# Patient Record
Sex: FEMALE | Race: WHITE | NOT HISPANIC OR LATINO | Employment: OTHER | URBAN - NONMETROPOLITAN AREA
[De-identification: names, ages, dates, MRNs, and addresses within clinical notes are randomized per-mention and may not be internally consistent; named-entity substitution may affect disease eponyms.]

---

## 2017-01-03 ENCOUNTER — HOSPITAL ENCOUNTER (OUTPATIENT)
Facility: MEDICAL CENTER | Age: 67
End: 2017-01-03
Attending: PHYSICIAN ASSISTANT
Payer: COMMERCIAL

## 2017-01-03 ENCOUNTER — OFFICE VISIT (OUTPATIENT)
Dept: URGENT CARE | Facility: PHYSICIAN GROUP | Age: 67
End: 2017-01-03
Payer: COMMERCIAL

## 2017-01-03 VITALS
SYSTOLIC BLOOD PRESSURE: 132 MMHG | TEMPERATURE: 96.8 F | WEIGHT: 164 LBS | DIASTOLIC BLOOD PRESSURE: 78 MMHG | HEART RATE: 77 BPM | BODY MASS INDEX: 28 KG/M2 | OXYGEN SATURATION: 93 % | HEIGHT: 64 IN

## 2017-01-03 DIAGNOSIS — R39.9 UTI SYMPTOMS: ICD-10-CM

## 2017-01-03 LAB
APPEARANCE UR: CLEAR
BILIRUB UR STRIP-MCNC: NEGATIVE MG/DL
COLOR UR AUTO: YELLOW
GLUCOSE UR STRIP.AUTO-MCNC: NEGATIVE MG/DL
KETONES UR STRIP.AUTO-MCNC: NEGATIVE MG/DL
LEUKOCYTE ESTERASE UR QL STRIP.AUTO: NEGATIVE
NITRITE UR QL STRIP.AUTO: NEGATIVE
PH UR STRIP.AUTO: 6 [PH] (ref 5–8)
PROT UR QL STRIP: NEGATIVE MG/DL
RBC UR QL AUTO: NEGATIVE
SP GR UR STRIP.AUTO: 1
UROBILINOGEN UR STRIP-MCNC: NEGATIVE MG/DL

## 2017-01-03 PROCEDURE — 87086 URINE CULTURE/COLONY COUNT: CPT

## 2017-01-03 PROCEDURE — 99214 OFFICE O/P EST MOD 30 MIN: CPT | Performed by: PHYSICIAN ASSISTANT

## 2017-01-03 PROCEDURE — 81002 URINALYSIS NONAUTO W/O SCOPE: CPT | Performed by: PHYSICIAN ASSISTANT

## 2017-01-03 RX ORDER — PHENAZOPYRIDINE HYDROCHLORIDE 200 MG/1
200 TABLET, FILM COATED ORAL 3 TIMES DAILY PRN
Qty: 6 TAB | Refills: 0 | Status: SHIPPED | OUTPATIENT
Start: 2017-01-03 | End: 2017-01-26

## 2017-01-03 RX ORDER — NITROFURANTOIN 25; 75 MG/1; MG/1
100 CAPSULE ORAL 2 TIMES DAILY
Qty: 10 CAP | Refills: 0 | Status: SHIPPED | OUTPATIENT
Start: 2017-01-03 | End: 2017-01-08

## 2017-01-03 NOTE — MR AVS SNAPSHOT
"        Elizabeth Murphy   1/3/2017 1:55 PM   Office Visit   MRN: [id number]    Department:  [facility]   Dept Phone:  [phone]    Description:  Female : 1950   Provider:  Tino Ga PA-C           Reason for Visit     Dysuria           Allergies as of 1/3/2017     Allergen Noted Reactions    Ampicillin 2015   Diarrhea    Patient stated.    Latex 2015   Hives      You were diagnosed with     UTI symptoms   [793827]         Vital Signs     Blood Pressure Pulse Temperature Height Weight Body Mass Index    132/78 mmHg 77 36 °C (96.8 °F) 1.626 m (5' 4.02\") 74.39 kg (164 lb) 28.14 kg/m2    Oxygen Saturation Smoking Status                93% Never Smoker           Basic Information     Date Of Birth Sex Race Ethnicity Preferred Language    1950 Female White Non- English      Your appointments     2017  1:40 PM   Established Patient with Mica Colon M.D.   [address] [address] [phone]           You will be receiving a confirmation call a few days before your appointment from our automated call confirmation system.              Problem List              ICD-10-CM Priority Class Noted - Resolved    Dyslipidemia E78.5   3/5/2013 - Present    Fibromyalgia M79.7   3/5/2013 - Present    Migraine    3/5/2013 - Present    Hypertension I10   3/6/2013 - Present    Vitamin D deficiency disease E55.9   2013 - Present    Common bile duct (CBD) stricture K83.1   3/3/2014 - Present    Chronic back pain M54.9, G89.29   3/4/2014 - Present    Hypothyroidism E03.9   2014 - Present    Mass of left foot R22.42   2014 - Present    Elevated fasting glucose R73.01   2014 - Present    Breast cyst N60.09   2015 - Present    GERD (gastroesophageal reflux disease) K21.9   2015 - Present    Chronic kidney disease, stage 3 N18.3   2015 - Present    Diarrhea R19.7   5/10/2015 - Present  "    Lumbar radiculopathy M54.16   5/10/2015 - Present    Yeast infection of the skin B37.2   6/17/2015 - Present    Vaginal atrophy N95.2   1/21/2016 - Present    HLD (hyperlipidemia) E78.5   4/20/2016 - Present    Dysuria R30.0   7/27/2016 - Present    Well woman exam with routine gynecological exam Z01.419   12/2/2016 - Present    Cyst of right breast N60.01   12/2/2016 - Present      Health Maintenance        Date Due Completion Dates    IMM DTaP/Tdap/Td Vaccine (1 - Tdap) 9/27/1971 ---    IMM ZOSTER VACCINE 9/27/2012 ---    MAMMOGRAM 12/15/2017 12/15/2016, 5/9/2016, 4/27/2016, 4/7/2015, 12/11/2013, 10/25/2011, 10/7/2011    PAP SMEAR 12/2/2019 12/2/2016, 2/1/2012    COLONOSCOPY 10/8/2024 10/8/2014            Results     POCT Urinalysis      Component Value Standard Range & Units    POC Color Yellow Negative    POC Appearance clear Negative    POC Leukocyte Esterase Negative Negative    POC Nitrites Negative Negative    POC Urobiligen Negative Negative (0.2) mg/dL    POC Protein Negative Negative mg/dL    POC Urine PH 6.0 5.0 - 8.0    POC Blood Negative Negative    POC Specific Gravity 1.005 <1.005 - >1.030    POC Ketones Negative Negative mg/dL    POC Biliruben Negative Negative mg/dL    POC Glucose Negative Negative mg/dL                        Current Immunizations     Influenza Vaccine Quad Inj (Pf) 1/20/2016, 10/9/2014 10:30 AM    Influenza Vaccine Quad Inj (Preserved) 10/31/2016, 10/20/2015      Below and/or attached are the medications your provider expects you to take. Review all of your home medications and newly ordered medications with your provider and/or pharmacist. Follow medication instructions as directed by your provider and/or pharmacist. Please keep your medication list with you and share with your provider. Update the information when medications are discontinued, doses are changed, or new medications (including over-the-counter products) are added; and carry medication information at all times  in the event of emergency situations     Allergies:  AMPICILLIN - Diarrhea     LATEX - Hives               Medications  Valid as of: January 03, 2017 -  3:16 PM    Generic Name Brand Name Tablet Size Instructions for use    Atorvastatin Calcium (Tab) LIPITOR 40 MG Take 1 Tab by mouth every day.        Calcium-Vitamin D   Take  by mouth.        Celecoxib (Cap) CELEBREX 100 MG Take 1 Cap by mouth every day.        Citalopram Hydrobromide (Tab) CELEXA 20 MG Take 30 mg by mouth every day.        ClonazePAM (Tab) KLONOPIN 1 MG Take 1 mg by mouth 2 times a day.        Fluticasone Propionate (Suspension) FLONASE 50 MCG/ACT Spray 2 Sprays in nose every day.        Levothyroxine Sodium (Tab) SYNTHROID 88 MCG Take 1 Tab by mouth every day.        Lisinopril-Hydrochlorothiazide (Tab) PRINZIDE, ZESTORETIC 20-12.5 MG Take 1 Tab by mouth every day.        Methylphenidate HCl (CAPSULE SR 24 HR) RITALIN LA 10 MG Take 10 mg by mouth every morning.        Nitrofurantoin Monohyd Macro (Cap) MACROBID 100 MG Take 1 Cap by mouth 2 times a day for 5 days.        Omeprazole (CAPSULE DELAYED RELEASE) PRILOSEC 40 MG Take 1 Cap by mouth every day.        Omeprazole (CAPSULE DELAYED RELEASE) PRILOSEC 20 MG Take 20 mg by mouth every day.        Phenazopyridine HCl (Tab) PYRIDIUM 200 MG Take 1 Tab by mouth 3 times a day as needed.        Simvastatin (Tab) ZOCOR 40 MG Take 1 Tab by mouth every evening.        Topiramate (Tab) TOPAMAX 100 MG Take 1 Tab by mouth 2 times a day.        TraMADol HCl (Tab) ULTRAM 50 MG Take 1 Tab by mouth every 6 hours as needed for Mild Pain (back pain).        TraZODone HCl (Tab) DESYREL 50 MG Take 50 mg by mouth every evening.        .                 Medicines prescribed today were sent to:     [facility]  [place] - [address] [facility]    [address] [facility] [place] [address]    Phone: [phone] Fax: [phone]    Open 24 Hours?: No    [facility] - [place], [place] -  [address]    [address] [facility] [address]    Phone: [phone] Fax: [phone]    Open 24 Hours?: No      Medication refill instructions:       If your prescription bottle indicates you have medication refills left, it is not necessary to call your provider’s office. Please contact your pharmacy and they will refill your medication.    If your prescription bottle indicates you do not have any refills left, you may request refills at any time through one of the following ways: The online [employer] system (except Urgent Care), by calling your provider’s office, or by asking your pharmacy to contact your provider’s office with a refill request. Medication refills are processed only during regular business hours and may not be available until the next business day. Your provider may request additional information or to have a follow-up visit with you prior to refilling your medication.   *Please Note: Medication refills are assigned a new Rx number when refilled electronically. Your pharmacy may indicate that no refills were authorized even though a new prescription for the same medication is available at the pharmacy. Please request the medicine by name with the pharmacy before contacting your provider for a refill.        Your To Do List     Future Labs/Procedures Complete By Expires    URINE CULTURE(NEW)  As directed 1/3/2018      Other Notes About Your Plan     Last UDS: 6/17/15  Dr Colon  Contolled Substance agreement signed:  6/17/15 Dr Darlene Botello Access Code: Activation code not generated  Current [employer] Status: Active

## 2017-01-03 NOTE — PROGRESS NOTES
Chief Complaint   Patient presents with   • Dysuria       HISTORY OF PRESENT ILLNESS: Patient is a 66 y.o. female who presents today because she has a 4-5 day history of increased urinary urgency, frequency, dysuria. She has been drinking over a gallon and a half of water a day, trying to get rid of it, which she thinks has been helping to some degree, but she still has the pain with urination. She has not been taking anything else. Denies any fevers, chills, nausea, vomiting or diarrhea.    Patient Active Problem List    Diagnosis Date Noted   • Well woman exam with routine gynecological exam 12/02/2016   • Cyst of right breast 12/02/2016   • Dysuria 07/27/2016   • HLD (hyperlipidemia) 04/20/2016   • Vaginal atrophy 01/21/2016   • Yeast infection of the skin 06/17/2015   • Diarrhea 05/10/2015   • Lumbar radiculopathy 05/10/2015   • Chronic kidney disease, stage 3 02/05/2015   • Breast cyst 02/04/2015   • GERD (gastroesophageal reflux disease) 02/04/2015   • Hypothyroidism 07/31/2014   • Mass of left foot 07/31/2014   • Elevated fasting glucose 07/31/2014   • Chronic back pain 03/04/2014   • Common bile duct (CBD) stricture 03/03/2014   • Vitamin D deficiency disease 11/19/2013   • Hypertension 03/06/2013   • Dyslipidemia 03/05/2013   • Fibromyalgia 03/05/2013   • Migraine 03/05/2013       Allergies:Ampicillin and Latex    Current Outpatient Prescriptions Ordered in [facility]   Medication Sig Dispense Refill   • phenazopyridine (PYRIDIUM) 200 MG Tab Take 1 Tab by mouth 3 times a day as needed. 6 Tab 0   • nitrofurantoin monohydr macro (MACROBID) 100 MG Cap Take 1 Cap by mouth 2 times a day for 5 days. 10 Cap 0   • omeprazole (PRILOSEC) 20 MG delayed-release capsule Take 20 mg by mouth every day.     • celecoxib (CELEBREX) 100 MG Cap Take 1 Cap by mouth every day. 30 Cap 3   • atorvastatin (LIPITOR) 40 MG Tab Take 1 Tab by mouth every day. 90 Tab 3   • simvastatin (ZOCOR) 40 MG Tab Take 1 Tab by mouth every evening. 90  Tab 3   • lisinopril-hydrochlorothiazide (PRINZIDE, ZESTORETIC) 20-12.5 MG per tablet Take 1 Tab by mouth every day. 90 Tab 3   • levothyroxine (SYNTHROID) 88 MCG Tab Take 1 Tab by mouth every day. 90 Tab 3   • tramadol (ULTRAM) 50 MG Tab Take 1 Tab by mouth every 6 hours as needed for Mild Pain (back pain). 60 Tab 4   • topiramate (TOPAMAX) 100 MG Tab Take 1 Tab by mouth 2 times a day. 60 Tab 4   • omeprazole (PRILOSEC) 40 MG delayed-release capsule Take 1 Cap by mouth every day. 90 Cap 3   • methylphenidate (RITALIN LA) 10 MG SR capsule Take 10 mg by mouth every morning.     • fluticasone (FLONASE) 50 MCG/ACT nasal spray Spray 2 Sprays in nose every day. 1 Bottle 4   • CALCIUM-VITAMIN D PO Take  by mouth.     • citalopram (CELEXA) 20 MG TABS Take 30 mg by mouth every day.     • trazodone (DESYREL) 50 MG TABS Take 50 mg by mouth every evening.     • clonazepam (KLONOPIN) 1 MG TABS Take 1 mg by mouth 2 times a day.       No current [facility]-ordered facility-administered medications on file.       Past Medical History   Diagnosis Date   • Post concussive syndrome      after fall in 1999   • Depression      sees Psych in [place]   • PTSD (post-traumatic stress disorder)    • Anxiety    • Fibromyalgia    • Degenerative joint disease      bulging discs in C-spine, L-spine   • History of mammogram      9/11, diagnostic showed breast cyst, rec 1 year   • Skin cancer      SCC   • Pap test, as part of routine gynecological examination      last 2008, no abn history   • S/P colonoscopy      nl at age 52, due 2013   • Common bile duct (CBD) stricture      hx of in 1990s   • Hypertension    • Hyperlipidemia        Social History   Substance Use Topics   • Smoking status: Never Smoker    • Smokeless tobacco: Never Used   • Alcohol Use: 0.0 oz/week     0 Standard drinks or equivalent per week       No family status information on file.     Family History   Problem Relation Age of Onset   • Cancer Mother        ROS:  Review of Systems  "  Constitutional: Negative for fever, chills, weight loss and malaise/fatigue.   HENT: Negative for ear pain, nosebleeds, congestion, sore throat and neck pain.    Eyes: Negative for blurred vision.   Respiratory: Negative for cough, sputum production, shortness of breath and wheezing.    Cardiovascular: Negative for chest pain, palpitations, orthopnea and leg swelling.   Gastrointestinal: Negative for heartburn, nausea, vomiting and abdominal pain.   Genitourinary: Positive for dysuria, urgency and frequency.     Exam:  Blood pressure 132/78, pulse 77, temperature 36 °C (96.8 °F), height 1.626 m (5' 4.02\"), weight 74.39 kg (164 lb), SpO2 93 %.  General:  Well nourished, well developed female in NAD  Head:Normocephalic, atraumatic  Eyes: PERRLA, EOM within normal limits, no conjunctival injection, no scleral icterus, visual fields and acuity grossly intact.  Pulmonary: chest is symmetrical with respiration, no wheezes, crackles, or rhonchi.  Cardiovascular: regular rate and rhythm without murmurs, rubs, or gallops.  Extremities: no clubbing, cyanosis, or edema.    Please note that this dictation was created using voice recognition software. I have made every reasonable attempt to correct obvious errors, but I expect that there are errors of grammar and possibly content that I did not discover before finalizing the note.    Assessment/Plan:  1. UTI symptoms  POCT Urinalysis    URINE CULTURE(NEW)    phenazopyridine (PYRIDIUM) 200 MG Tab    nitrofurantoin monohydr macro (MACROBID) 100 MG Cap    symptoms. Pathopneumonic for UTI, UA in the office unremarkable. Will await culture and I will call the patient Regardless of her findings    Followup with primary care in the next 7-10 days if not significantly improving, return to the urgent care or go to the emergency room sooner for any worsening of symptoms.         "

## 2017-01-05 LAB
BACTERIA UR CULT: NORMAL
SIGNIFICANT IND 70042: NORMAL
SOURCE SOURCE: NORMAL

## 2017-01-11 RX ORDER — SIMVASTATIN 40 MG
TABLET ORAL
Qty: 90 TAB | Refills: 3 | Status: SHIPPED | OUTPATIENT
Start: 2017-01-11 | End: 2017-04-16

## 2017-01-18 DIAGNOSIS — E78.00 PURE HYPERCHOLESTEROLEMIA: ICD-10-CM

## 2017-01-18 RX ORDER — ATORVASTATIN CALCIUM 40 MG/1
40 TABLET, FILM COATED ORAL DAILY
Qty: 90 TAB | Refills: 3 | Status: SHIPPED | OUTPATIENT
Start: 2017-01-18 | End: 2017-10-31

## 2017-01-18 RX ORDER — LEVOTHYROXINE SODIUM 88 UG/1
TABLET ORAL
Qty: 90 TAB | Refills: 3 | Status: SHIPPED | OUTPATIENT
Start: 2017-01-18 | End: 2017-01-23 | Stop reason: SDUPTHER

## 2017-01-19 ENCOUNTER — PATIENT MESSAGE (OUTPATIENT)
Dept: MEDICAL GROUP | Facility: PHYSICIAN GROUP | Age: 67
End: 2017-01-19

## 2017-01-19 DIAGNOSIS — E03.9 HYPOTHYROIDISM, UNSPECIFIED TYPE: ICD-10-CM

## 2017-01-19 NOTE — TELEPHONE ENCOUNTER
Was the patient seen in the last year in this department? Yes     Does patient have an active prescription for medications requested? Yes, changing to [facility] please    Received Request Via: Pharmacy

## 2017-01-20 ENCOUNTER — TELEPHONE (OUTPATIENT)
Dept: MEDICAL GROUP | Facility: PHYSICIAN GROUP | Age: 67
End: 2017-01-20

## 2017-01-23 RX ORDER — LEVOTHYROXINE SODIUM 88 UG/1
TABLET ORAL
Qty: 90 TAB | Refills: 3 | Status: SHIPPED | OUTPATIENT
Start: 2017-01-23 | End: 2018-01-29 | Stop reason: SDUPTHER

## 2017-01-26 ENCOUNTER — HOSPITAL ENCOUNTER (OUTPATIENT)
Facility: MEDICAL CENTER | Age: 67
End: 2017-01-26
Attending: NURSE PRACTITIONER
Payer: COMMERCIAL

## 2017-01-26 ENCOUNTER — OFFICE VISIT (OUTPATIENT)
Dept: MEDICAL GROUP | Facility: PHYSICIAN GROUP | Age: 67
End: 2017-01-26
Payer: COMMERCIAL

## 2017-01-26 VITALS
TEMPERATURE: 97.2 F | BODY MASS INDEX: 26.12 KG/M2 | DIASTOLIC BLOOD PRESSURE: 60 MMHG | RESPIRATION RATE: 16 BRPM | HEART RATE: 66 BPM | SYSTOLIC BLOOD PRESSURE: 112 MMHG | HEIGHT: 64 IN | WEIGHT: 153 LBS | OXYGEN SATURATION: 96 %

## 2017-01-26 DIAGNOSIS — R35.0 URINARY FREQUENCY: ICD-10-CM

## 2017-01-26 DIAGNOSIS — N39.0 FREQUENT UTI: ICD-10-CM

## 2017-01-26 DIAGNOSIS — R30.0 DYSURIA: ICD-10-CM

## 2017-01-26 DIAGNOSIS — N95.2 VAGINAL ATROPHY: ICD-10-CM

## 2017-01-26 LAB
APPEARANCE UR: CLEAR
BILIRUB UR STRIP-MCNC: NORMAL MG/DL
COLOR UR AUTO: NORMAL
GLUCOSE UR STRIP.AUTO-MCNC: NORMAL MG/DL
KETONES UR STRIP.AUTO-MCNC: NORMAL MG/DL
LEUKOCYTE ESTERASE UR QL STRIP.AUTO: NORMAL
NITRITE UR QL STRIP.AUTO: NORMAL
PH UR STRIP.AUTO: 6 [PH] (ref 5–8)
PROT UR QL STRIP: NORMAL MG/DL
RBC UR QL AUTO: NORMAL
SP GR UR STRIP.AUTO: 1.02
UROBILINOGEN UR STRIP-MCNC: NORMAL MG/DL

## 2017-01-26 PROCEDURE — 87077 CULTURE AEROBIC IDENTIFY: CPT

## 2017-01-26 PROCEDURE — 87086 URINE CULTURE/COLONY COUNT: CPT

## 2017-01-26 PROCEDURE — 81002 URINALYSIS NONAUTO W/O SCOPE: CPT | Performed by: NURSE PRACTITIONER

## 2017-01-26 PROCEDURE — 87186 SC STD MICRODIL/AGAR DIL: CPT

## 2017-01-26 PROCEDURE — 99214 OFFICE O/P EST MOD 30 MIN: CPT | Performed by: NURSE PRACTITIONER

## 2017-01-26 RX ORDER — PHENAZOPYRIDINE HYDROCHLORIDE 200 MG/1
200 TABLET, FILM COATED ORAL 3 TIMES DAILY PRN
Qty: 16 TAB | Refills: 0 | Status: SHIPPED | OUTPATIENT
Start: 2017-01-26 | End: 2017-03-17 | Stop reason: SDUPTHER

## 2017-01-26 RX ORDER — SULFAMETHOXAZOLE AND TRIMETHOPRIM 800; 160 MG/1; MG/1
1 TABLET ORAL 2 TIMES DAILY
Qty: 10 TAB | Refills: 0 | Status: SHIPPED | OUTPATIENT
Start: 2017-01-26 | End: 2017-01-31

## 2017-01-26 NOTE — MR AVS SNAPSHOT
"        Elizabeth Murphy   2017 9:40 AM   Office Visit   MRN: [id number]    Department:  [facility]   Dept Phone:  [phone]    Description:  Female : 1950   Provider:  TERRI Quiñones           Reason for Visit     Urinary Frequency burning and pain. X last wednesday       Allergies as of 2017     Allergen Noted Reactions    Ampicillin 2015   Diarrhea    Patient stated.    Latex 2015   Hives      You were diagnosed with     Urinary frequency   [788.41.ICD-9-CM]       Dysuria   [788.1.ICD-9-CM]       Vaginal atrophy   [398655]       Frequent UTI   [942073]         Vital Signs     Blood Pressure Pulse Temperature Respirations Height Weight    112/60 mmHg 66 36.2 °C (97.2 °F) 16 1.626 m (5' 4.02\") 69.4 kg (153 lb)    Body Mass Index Oxygen Saturation Smoking Status             26.25 kg/m2 96% Never Smoker          Basic Information     Date Of Birth Sex Race Ethnicity Preferred Language    1950 Female White Non- English      Your appointments     2017  1:40 PM   Established Patient with Mica Colon M.D.   [address] [address] [phone]           You will be receiving a confirmation call a few days before your appointment from our automated call confirmation system.              Problem List              ICD-10-CM Priority Class Noted - Resolved    Dyslipidemia E78.5   3/5/2013 - Present    Fibromyalgia M79.7   3/5/2013 - Present    Migraine    3/5/2013 - Present    Hypertension I10   3/6/2013 - Present    Vitamin D deficiency disease E55.9   2013 - Present    Common bile duct (CBD) stricture K83.1   3/3/2014 - Present    Chronic back pain M54.9, G89.29   3/4/2014 - Present    Hypothyroidism E03.9   2014 - Present    Mass of left foot R22.42   2014 - Present    Elevated fasting glucose R73.01   2014 - Present    Breast cyst N60.09   2015 - Present "    GERD (gastroesophageal reflux disease) K21.9   2/4/2015 - Present    Chronic kidney disease, stage 3 N18.3   2/5/2015 - Present    Diarrhea R19.7   5/10/2015 - Present    Lumbar radiculopathy M54.16   5/10/2015 - Present    Yeast infection of the skin B37.2   6/17/2015 - Present    Vaginal atrophy N95.2   1/21/2016 - Present    HLD (hyperlipidemia) E78.5   4/20/2016 - Present    Dysuria R30.0   7/27/2016 - Present    Well woman exam with routine gynecological exam Z01.419   12/2/2016 - Present    Cyst of right breast N60.01   12/2/2016 - Present      Health Maintenance        Date Due Completion Dates    IMM DTaP/Tdap/Td Vaccine (1 - Tdap) 9/27/1971 ---    IMM ZOSTER VACCINE 9/27/2012 ---    MAMMOGRAM 12/15/2017 12/15/2016, 5/9/2016, 4/27/2016, 4/7/2015, 12/11/2013, 10/25/2011, 10/7/2011    PAP SMEAR 12/2/2019 12/2/2016, 2/1/2012    COLONOSCOPY 10/8/2024 10/8/2014            Results     POCT Urinalysis      Component Value Standard Range & Units    POC Color rocio Negative    POC Appearance clear Negative    POC Leukocyte Esterase neg Negative    POC Nitrites neg Negative    POC Urobiligen neg Negative (0.2) mg/dL    POC Protein neg Negative mg/dL    POC Urine PH 6.0 5.0 - 8.0    POC Blood neg Negative    POC Specific Gravity 1.025 <1.005 - >1.030    POC Ketones neg Negative mg/dL    POC Biliruben neg Negative mg/dL    POC Glucose neg Negative mg/dL                        Current Immunizations     Influenza Vaccine Quad Inj (Pf) 1/20/2016, 10/9/2014 10:30 AM    Influenza Vaccine Quad Inj (Preserved) 10/31/2016, 10/20/2015      Below and/or attached are the medications your provider expects you to take. Review all of your home medications and newly ordered medications with your provider and/or pharmacist. Follow medication instructions as directed by your provider and/or pharmacist. Please keep your medication list with you and share with your provider. Update the information when medications are discontinued,  doses are changed, or new medications (including over-the-counter products) are added; and carry medication information at all times in the event of emergency situations     Allergies:  AMPICILLIN - Diarrhea     LATEX - Hives               Medications  Valid as of: January 26, 2017 - 10:35 AM    Generic Name Brand Name Tablet Size Instructions for use    Atorvastatin Calcium (Tab) LIPITOR 40 MG Take 1 Tab by mouth every day.        Calcium-Vitamin D   Take  by mouth.        Celecoxib (Cap) CELEBREX 100 MG Take 1 Cap by mouth every day.        Citalopram Hydrobromide (Tab) CELEXA 20 MG Take 30 mg by mouth every day.        ClonazePAM (Tab) KLONOPIN 1 MG Take 1 mg by mouth 2 times a day.        Fluticasone Propionate (Suspension) FLONASE 50 MCG/ACT Spray 2 Sprays in nose every day.        Levothyroxine Sodium (Tab) SYNTHROID 88 MCG TAKE ONE TABLET BY MOUTH ONCE DAILY        Lisinopril-Hydrochlorothiazide (Tab) PRINZIDE, ZESTORETIC 20-12.5 MG Take 1 Tab by mouth every day.        Methylphenidate HCl (CAPSULE SR 24 HR) RITALIN LA 10 MG Take 10 mg by mouth every morning.        Omeprazole (CAPSULE DELAYED RELEASE) PRILOSEC 40 MG Take 1 Cap by mouth every day.        Omeprazole (CAPSULE DELAYED RELEASE) PRILOSEC 20 MG Take 20 mg by mouth every day.        Phenazopyridine HCl (Tab) PYRIDIUM 200 MG Take 1 Tab by mouth 3 times a day as needed for Mild Pain or Moderate Pain.        Simvastatin (Tab) ZOCOR 40 MG TAKE 1 TABLET EVERY EVENING        Sulfamethoxazole-Trimethoprim (Tab) BACTRIM -160 MG Take 1 Tab by mouth 2 Times a Day for 5 days.        Topiramate (Tab) TOPAMAX 100 MG Take 1 Tab by mouth 2 times a day.        TraMADol HCl (Tab) ULTRAM 50 MG Take 1 Tab by mouth every 6 hours as needed for Mild Pain (back pain).        TraZODone HCl (Tab) DESYREL 50 MG Take 50 mg by mouth every evening.        .                 Medicines prescribed today were sent to:     [facility] [address] [place] [address], [place] - [address] [place] [address]  [facility]    [address] [facility] [place] [address]    Phone: [phone] Fax: [phone]    Open 24 Hours?: No    [facility] - [place], [place] - [address] [facility] [facility]    [address] [facility] [address]    Phone: [phone] Fax: [phone]    Open 24 Hours?: No    [facility] - [place], [place] - [address] [place] [address]    [address] [address]    Phone: [phone] Fax: [phone]    Open 24 Hours?: No      Medication refill instructions:       If your prescription bottle indicates you have medication refills left, it is not necessary to call your provider’s office. Please contact your pharmacy and they will refill your medication.    If your prescription bottle indicates you do not have any refills left, you may request refills at any time through one of the following ways: The online [employer] system (except Urgent Care), by calling your provider’s office, or by asking your pharmacy to contact your provider’s office with a refill request. Medication refills are processed only during regular business hours and may not be available until the next business day. Your provider may request additional information or to have a follow-up visit with you prior to refilling your medication.   *Please Note: Medication refills are assigned a new Rx number when refilled electronically. Your pharmacy may indicate that no refills were authorized even though a new prescription for the same medication is available at the pharmacy. Please request the medicine by name with the pharmacy before contacting your provider for a refill.        Your To Do List     Future Labs/Procedures Complete By Expires    URINE CULTURE(NEW)  As directed 1/26/2018      Referral     A referral request has been sent to our patient care coordination department. Please allow 3-5 business days for us to process this request and contact you either by phone or mail. If you do not hear  from us by the 5th business day, please call us at [phone].        Other Notes About Your Plan     Last UDS: 6/17/15  Dr Colon  Contolled Substance agreement signed:  6/17/15 Dr Colon                 MyChart Access Code: Activation code not generated  Current MyChart Status: Active

## 2017-01-26 NOTE — PROGRESS NOTES
[facility]  Primary Care Office Visit - Problem-Oriented        History:     Elizabeth Murphy is a 66 y.o. female who is here today to discuss Urinary Frequency      Dysuria  This is an ongoing, recurrent, uncontrolled problem. Patient was recently seen in the urgent care for symptoms of urgency, dysuria, suprapubic pain, her urinalysis was unremarkable, urine culture was unremarkable, she was given Macrobid and Pyridium, following completing this course her symptoms resolved.     She tells me that the symptoms returned about a week later, she is still having dysuria, frequency and suprapubic pain. She denies hematuria, low back pain, fevers, vomiting. She denies vaginal symptoms, no new vaginal discharge, vaginal odor. This is her third UTI in 6 months. Again, her urinalysis is negative for leukocytes, we have ordered a urine culture. She is postmenopausal and we did discuss atrophic vaginitis and uterine prolapse as contributing factors. At one point she was prescribed Premarin cream that she tells me she never got this. We discussed daily low-dose Macrobid versus referral to urogynecology, patient prefers referral to urogynecology. We will treat with Bactrim as I will not have the urine culture back prior to the weekend, she is agreeable. She will start Pyridium for symptomatic relief.                 Past Medical History   Diagnosis Date   • Post concussive syndrome      after fall in 1999   • Depression      sees Psych in [place]   • PTSD (post-traumatic stress disorder)    • Anxiety    • Fibromyalgia    • Degenerative joint disease      bulging discs in C-spine, L-spine   • History of mammogram      9/11, diagnostic showed breast cyst, rec 1 year   • Skin cancer      SCC   • Pap test, as part of routine gynecological examination      last 2008, no abn history   • S/P colonoscopy      nl at age 52, due 2013   • Common bile duct (CBD) stricture      hx of in 1990s   • Hypertension    • Hyperlipidemia       Past Surgical History   Procedure Laterality Date   • Anna by laparoscopy     • Egd w/endoscopic ultrasound       1990s, common bile duct stricture, no stent     Social History     Social History   • Marital Status:      Spouse Name: N/A   • Number of Children: N/A   • Years of Education: N/A     Occupational History   • Not on file.     Social History Main Topics   • Smoking status: Never Smoker    • Smokeless tobacco: Never Used   • Alcohol Use: 0.0 oz/week     0 Standard drinks or equivalent per week   • Drug Use: No   • Sexual Activity: Yes     Other Topics Concern   • Not on file     Social History Narrative     History   Smoking status   • Never Smoker    Smokeless tobacco   • Never Used     Family History   Problem Relation Age of Onset   • Cancer Mother      Allergies   Allergen Reactions   • Ampicillin Diarrhea     Patient stated.   • Latex Hives       Problem List:     Patient Active Problem List    Diagnosis Date Noted   • Well woman exam with routine gynecological exam 12/02/2016   • Cyst of right breast 12/02/2016   • Dysuria 07/27/2016   • HLD (hyperlipidemia) 04/20/2016   • Vaginal atrophy 01/21/2016   • Yeast infection of the skin 06/17/2015   • Diarrhea 05/10/2015   • Lumbar radiculopathy 05/10/2015   • Chronic kidney disease, stage 3 02/05/2015   • Breast cyst 02/04/2015   • GERD (gastroesophageal reflux disease) 02/04/2015   • Hypothyroidism 07/31/2014   • Mass of left foot 07/31/2014   • Elevated fasting glucose 07/31/2014   • Chronic back pain 03/04/2014   • Common bile duct (CBD) stricture 03/03/2014   • Vitamin D deficiency disease 11/19/2013   • Hypertension 03/06/2013   • Dyslipidemia 03/05/2013   • Fibromyalgia 03/05/2013   • Migraine 03/05/2013         Medications:     Current outpatient prescriptions:   •  phenazopyridine (PYRIDIUM) 200 MG Tab, Take 1 Tab by mouth 3 times a day as needed for Mild Pain or Moderate Pain., Disp: 16 Tab, Rfl: 0  •  sulfamethoxazole-trimethoprim  "(BACTRIM DS) 800-160 MG tablet, Take 1 Tab by mouth 2 Times a Day for 5 days., Disp: 10 Tab, Rfl: 0  •  levothyroxine (SYNTHROID) 88 MCG Tab, TAKE ONE TABLET BY MOUTH ONCE DAILY, Disp: 90 Tab, Rfl: 3  •  atorvastatin (LIPITOR) 40 MG Tab, Take 1 Tab by mouth every day., Disp: 90 Tab, Rfl: 3  •  lisinopril-hydrochlorothiazide (PRINZIDE, ZESTORETIC) 20-12.5 MG per tablet, Take 1 Tab by mouth every day., Disp: 90 Tab, Rfl: 3  •  topiramate (TOPAMAX) 100 MG Tab, Take 1 Tab by mouth 2 times a day., Disp: 60 Tab, Rfl: 4  •  omeprazole (PRILOSEC) 40 MG delayed-release capsule, Take 1 Cap by mouth every day., Disp: 90 Cap, Rfl: 3  •  CALCIUM-VITAMIN D PO, Take  by mouth., Disp: , Rfl:   •  citalopram (CELEXA) 20 MG TABS, Take 30 mg by mouth every day., Disp: , Rfl:   •  trazodone (DESYREL) 50 MG TABS, Take 50 mg by mouth every evening., Disp: , Rfl:   •  clonazepam (KLONOPIN) 1 MG TABS, Take 1 mg by mouth 2 times a day., Disp: , Rfl:   •  simvastatin (ZOCOR) 40 MG Tab, TAKE 1 TABLET EVERY EVENING, Disp: 90 Tab, Rfl: 3  •  omeprazole (PRILOSEC) 20 MG delayed-release capsule, Take 20 mg by mouth every day., Disp: , Rfl:   •  celecoxib (CELEBREX) 100 MG Cap, Take 1 Cap by mouth every day., Disp: 30 Cap, Rfl: 3  •  tramadol (ULTRAM) 50 MG Tab, Take 1 Tab by mouth every 6 hours as needed for Mild Pain (back pain)., Disp: 60 Tab, Rfl: 4  •  methylphenidate (RITALIN LA) 10 MG SR capsule, Take 10 mg by mouth every morning., Disp: , Rfl:   •  fluticasone (FLONASE) 50 MCG/ACT nasal spray, Spray 2 Sprays in nose every day., Disp: 1 Bottle, Rfl: 4      Review of Systems:     See HPI for positives, all other systems reviewed and WNL       Physical Assessment:     VS: /60 mmHg  Pulse 66  Temp(Src) 36.2 °C (97.2 °F)  Resp 16  Ht 1.626 m (5' 4.02\")  Wt 69.4 kg (153 lb)  BMI 26.25 kg/m2  SpO2 96%    General: Well-developed, well-nourished female   Head: PERRL, EOMI. Normocephalic.  Cardiovasc:No JVD.  RRR, no MRG. No thrills or " bruits. Pulses 2+ and symmetric at all distal extremities.  Pulmonary: Lungs clear bilaterally.  Normal respiratory effort. No wheeze or crackles.   Abdomen: No CVA tenderness. Abdomen soft, NT, ND, NAB.  No masses or organomegaly.  Neuro: Alert and oriented  Skin:No rashes noted. Skin warm, dry, intact    Psych: Dressed appropriately for the weather, pleasant and conversant.  Affect, mood & judgment appropriate.      Assessment/Plan:   Elizabeth was seen today for urinary frequency.    Diagnoses and all orders for this visit:    Dysuria, ongoing, uncontrolled   - Urinalysis is unremarkable for leukocytes and nitrites, urine culture has been sent, start Pyridium, we'll treat empirically with Bactrim as we are going into the weekend and I will not have urine culture results until Monday. She requests referral to urogynecology, I think this is reasonable as this is her third UTI in 6 months, the referral has been placed.  -     phenazopyridine (PYRIDIUM) 200 MG Tab; Take 1 Tab by mouth 3 times a day as needed for Mild Pain or Moderate Pain.  -     sulfamethoxazole-trimethoprim (BACTRIM DS) 800-160 MG tablet; Take 1 Tab by mouth 2 Times a Day for 5 days.  -     REFERRAL TO UROGYNECOLOGY  -     URINE CULTURE(NEW); Future    Frequent UTI, ongoing, uncontrolled   - see above plan of care  -     REFERRAL TO UROGYNECOLOGY  -     URINE CULTURE(NEW); Future    Patient is agreeable to the above plan and voiced understanding. All questions answered.     Please note that this dictation was created using voice recognition software. I have made every reasonable attempt to correct obvious errors, but I expect that there are errors of grammar and possibly content that I did not discover before finalizing the note.      JESSIE Higgins  1/26/2017, 12:54 PM

## 2017-01-26 NOTE — ASSESSMENT & PLAN NOTE
This is an ongoing, recurrent, uncontrolled problem. Patient was recently seen in the urgent care for symptoms of urgency, dysuria, suprapubic pain, her urinalysis was unremarkable, urine culture was unremarkable, she was given Macrobid and Pyridium, following completing this course her symptoms resolved.     She tells me that the symptoms returned about a week later, she is still having dysuria, frequency and suprapubic pain. She denies hematuria, low back pain, fevers, vomiting. She denies vaginal symptoms, no new vaginal discharge, vaginal odor. This is her third UTI in 6 months. Again, her urinalysis is negative for leukocytes, we have ordered a urine culture. She is postmenopausal and we did discuss atrophic vaginitis and uterine prolapse as contributing factors. At one point she was prescribed Premarin cream that she tells me she never got this. We discussed daily low-dose Macrobid versus referral to urogynecology, patient prefers referral to urogynecology. We will treat with Bactrim as I will not have the urine culture back prior to the weekend, she is agreeable. She will start Pyridium for symptomatic relief.

## 2017-01-28 LAB
BACTERIA UR CULT: ABNORMAL
BACTERIA UR CULT: ABNORMAL
SIGNIFICANT IND 70042: ABNORMAL
SOURCE SOURCE: ABNORMAL

## 2017-02-14 ENCOUNTER — HOSPITAL ENCOUNTER (OUTPATIENT)
Dept: LAB | Facility: MEDICAL CENTER | Age: 67
End: 2017-02-14
Attending: INTERNAL MEDICINE
Payer: COMMERCIAL

## 2017-02-14 DIAGNOSIS — E78.00 PURE HYPERCHOLESTEROLEMIA: ICD-10-CM

## 2017-02-14 LAB
ALBUMIN SERPL BCP-MCNC: 4.4 G/DL (ref 3.2–4.9)
ALBUMIN/GLOB SERPL: 1.8 G/DL
ALP SERPL-CCNC: 46 U/L (ref 30–99)
ALT SERPL-CCNC: 17 U/L (ref 2–50)
ANION GAP SERPL CALC-SCNC: 4 MMOL/L (ref 0–11.9)
AST SERPL-CCNC: 13 U/L (ref 12–45)
BILIRUB SERPL-MCNC: 0.4 MG/DL (ref 0.1–1.5)
BUN SERPL-MCNC: 28 MG/DL (ref 8–22)
CALCIUM SERPL-MCNC: 10.5 MG/DL (ref 8.5–10.5)
CHLORIDE SERPL-SCNC: 107 MMOL/L (ref 96–112)
CHOLEST SERPL-MCNC: 167 MG/DL (ref 100–199)
CO2 SERPL-SCNC: 28 MMOL/L (ref 20–33)
CREAT SERPL-MCNC: 1.04 MG/DL (ref 0.5–1.4)
GLOBULIN SER CALC-MCNC: 2.4 G/DL (ref 1.9–3.5)
GLUCOSE SERPL-MCNC: 95 MG/DL (ref 65–99)
HDLC SERPL-MCNC: 52 MG/DL
LDLC SERPL CALC-MCNC: 96 MG/DL
POTASSIUM SERPL-SCNC: 4.2 MMOL/L (ref 3.6–5.5)
PROT SERPL-MCNC: 6.8 G/DL (ref 6–8.2)
SODIUM SERPL-SCNC: 139 MMOL/L (ref 135–145)
TRIGL SERPL-MCNC: 97 MG/DL (ref 0–149)

## 2017-02-14 PROCEDURE — 80061 LIPID PANEL: CPT

## 2017-02-14 PROCEDURE — 80053 COMPREHEN METABOLIC PANEL: CPT

## 2017-02-14 PROCEDURE — 36415 COLL VENOUS BLD VENIPUNCTURE: CPT

## 2017-02-21 ENCOUNTER — OFFICE VISIT (OUTPATIENT)
Dept: MEDICAL GROUP | Facility: PHYSICIAN GROUP | Age: 67
End: 2017-02-21
Payer: COMMERCIAL

## 2017-02-21 VITALS
OXYGEN SATURATION: 94 % | SYSTOLIC BLOOD PRESSURE: 110 MMHG | DIASTOLIC BLOOD PRESSURE: 62 MMHG | BODY MASS INDEX: 26.02 KG/M2 | TEMPERATURE: 97.7 F | WEIGHT: 152.4 LBS | HEIGHT: 64 IN | HEART RATE: 64 BPM

## 2017-02-21 DIAGNOSIS — F33.1 MODERATE EPISODE OF RECURRENT MAJOR DEPRESSIVE DISORDER (HCC): ICD-10-CM

## 2017-02-21 DIAGNOSIS — I10 ESSENTIAL HYPERTENSION: ICD-10-CM

## 2017-02-21 DIAGNOSIS — N18.30 CHRONIC KIDNEY DISEASE, STAGE 3 (HCC): ICD-10-CM

## 2017-02-21 DIAGNOSIS — B37.2 YEAST INFECTION OF THE SKIN: ICD-10-CM

## 2017-02-21 DIAGNOSIS — E78.00 PURE HYPERCHOLESTEROLEMIA: ICD-10-CM

## 2017-02-21 DIAGNOSIS — M79.7 FIBROMYALGIA: ICD-10-CM

## 2017-02-21 DIAGNOSIS — L98.7 EXCESSIVE AND REDUNDANT SKIN AND SUBCUTANEOUS TISSUE: ICD-10-CM

## 2017-02-21 PROBLEM — G93.2 ELEVATED INTRACRANIAL PRESSURE: Status: ACTIVE | Noted: 2017-02-21

## 2017-02-21 PROCEDURE — 99214 OFFICE O/P EST MOD 30 MIN: CPT | Performed by: INTERNAL MEDICINE

## 2017-02-21 RX ORDER — TIMOLOL MALEATE 5 MG/ML
1 SOLUTION/ DROPS OPHTHALMIC 2 TIMES DAILY
COMMUNITY
End: 2017-06-21

## 2017-02-21 RX ORDER — PREGABALIN 50 MG/1
50 CAPSULE ORAL 2 TIMES DAILY
COMMUNITY
End: 2017-06-21

## 2017-02-21 RX ORDER — NYSTATIN 100000 [USP'U]/G
POWDER TOPICAL
Qty: 60 G | Refills: 3 | Status: SHIPPED | OUTPATIENT
Start: 2017-02-21 | End: 2017-04-16

## 2017-02-21 NOTE — ASSESSMENT & PLAN NOTE
The patient has had slow good weight loss over time with diet changes. She has lost 50lbs. Her blood pressure has been well controlled.

## 2017-02-21 NOTE — MR AVS SNAPSHOT
"        Elizabeth Murphy   2017 1:40 PM   Office Visit   MRN: [id number]    Department:  [facility]   Dept Phone:  [phone]    Description:  Female : 1950   Provider:  Mica Colon M.D.           Reason for Visit     Results labs      Allergies as of 2017     Allergen Noted Reactions    Ampicillin 2015   Diarrhea    Patient stated.    Latex 2015   Hives      You were diagnosed with     Essential hypertension   [5528335]       Excessive and redundant skin and subcutaneous tissue   [8112512]       Yeast infection of the skin   [025261]       Chronic kidney disease, stage 3   [758959]       Fibromyalgia   [149418]       Moderate episode of recurrent major depressive disorder (CMS-HCC)   [2540976]       Elevated intracranial pressure   [340974]       Pure hypercholesterolemia   [272.0.ICD-9-CM]         Vital Signs     Blood Pressure Pulse Temperature Height Weight Body Mass Index    110/62 mmHg 64 36.5 °C (97.7 °F) 1.626 m (5' 4\") 69.128 kg (152 lb 6.4 oz) 26.15 kg/m2    Oxygen Saturation Smoking Status                94% Never Smoker           Basic Information     Date Of Birth Sex Race Ethnicity Preferred Language    1950 Female White Non- English      Your appointments     2017  2:20 PM   Established Patient with TERRI Serna   [facility] ([place]    [address] [place] [id number] [phone]           You will be receiving a confirmation call a few days before your appointment from our automated call confirmation system.              Problem List              ICD-10-CM Priority Class Noted - Resolved    Dyslipidemia E78.5   3/5/2013 - Present    Fibromyalgia M79.7   3/5/2013 - Present    Migraine    3/5/2013 - Present    Hypertension I10   3/6/2013 - Present    Vitamin D deficiency disease E55.9   2013 - Present    Common bile duct (CBD) stricture K83.1   3/3/2014 - Present    Chronic back " pain M54.9, G89.29   3/4/2014 - Present    Hypothyroidism E03.9   7/31/2014 - Present    Mass of left foot R22.42   7/31/2014 - Present    Elevated fasting glucose R73.01   7/31/2014 - Present    Breast cyst N60.09   2/4/2015 - Present    GERD (gastroesophageal reflux disease) K21.9   2/4/2015 - Present    Chronic kidney disease, stage 3 N18.3   2/5/2015 - Present    Diarrhea R19.7   5/10/2015 - Present    Lumbar radiculopathy M54.16   5/10/2015 - Present    Yeast infection of the skin B37.2   6/17/2015 - Present    Vaginal atrophy N95.2   1/21/2016 - Present    HLD (hyperlipidemia) E78.5   4/20/2016 - Present    Dysuria R30.0   7/27/2016 - Present    Well woman exam with routine gynecological exam Z01.419   12/2/2016 - Present    Cyst of right breast N60.01   12/2/2016 - Present    Moderate episode of recurrent major depressive disorder (CMS-HCC) F33.1   2/21/2017 - Present    Excessive and redundant skin and subcutaneous tissue L98.7   2/21/2017 - Present    Elevated intracranial pressure G93.2   2/21/2017 - Present      Health Maintenance        Date Due Completion Dates    IMM DTaP/Tdap/Td Vaccine (1 - Tdap) 9/27/1971 ---    IMM ZOSTER VACCINE 9/27/2012 ---    MAMMOGRAM 12/15/2017 12/15/2016, 5/9/2016, 4/27/2016, 4/7/2015, 12/11/2013, 10/25/2011, 10/7/2011    PAP SMEAR 12/2/2019 12/2/2016, 2/1/2012    COLONOSCOPY 10/8/2024 10/8/2014            Current Immunizations     Influenza Vaccine Quad Inj (Pf) 1/20/2016, 10/9/2014 10:30 AM    Influenza Vaccine Quad Inj (Preserved) 10/31/2016, 10/20/2015      Below and/or attached are the medications your provider expects you to take. Review all of your home medications and newly ordered medications with your provider and/or pharmacist. Follow medication instructions as directed by your provider and/or pharmacist. Please keep your medication list with you and share with your provider. Update the information when medications are discontinued, doses are changed, or new  medications (including over-the-counter products) are added; and carry medication information at all times in the event of emergency situations     Allergies:  AMPICILLIN - Diarrhea     LATEX - Hives               Medications  Valid as of: February 21, 2017 -  2:00 PM    Generic Name Brand Name Tablet Size Instructions for use    Atorvastatin Calcium (Tab) LIPITOR 40 MG Take 1 Tab by mouth every day.        Calcium-Vitamin D   Take  by mouth.        Celecoxib (Cap) CELEBREX 100 MG Take 1 Cap by mouth every day.        Citalopram Hydrobromide (Tab) CELEXA 20 MG Take 30 mg by mouth every day.        ClonazePAM (Tab) KLONOPIN 1 MG Take 1 mg by mouth 2 times a day.        Fluticasone Propionate (Suspension) FLONASE 50 MCG/ACT Spray 2 Sprays in nose every day.        Levothyroxine Sodium (Tab) SYNTHROID 88 MCG TAKE ONE TABLET BY MOUTH ONCE DAILY        Lisinopril-Hydrochlorothiazide (Tab) PRINZIDE, ZESTORETIC 20-12.5 MG Take 1 Tab by mouth every day.        Methylphenidate HCl (CAPSULE SR 24 HR) RITALIN LA 10 MG Take 10 mg by mouth every morning.        Nystatin (Powder) MYCOSTATIN 508348 UNIT/GM Apply to yeast infection in pannus up to twice per day        Omeprazole (CAPSULE DELAYED RELEASE) PRILOSEC 40 MG Take 1 Cap by mouth every day.        Omeprazole (CAPSULE DELAYED RELEASE) PRILOSEC 20 MG Take 20 mg by mouth every day.        Phenazopyridine HCl (Tab) PYRIDIUM 200 MG Take 1 Tab by mouth 3 times a day as needed for Mild Pain or Moderate Pain.        Pregabalin (Cap) LYRICA 50 MG Take 50 mg by mouth 2 times a day.        Simvastatin (Tab) ZOCOR 40 MG TAKE 1 TABLET EVERY EVENING        Timolol Maleate (Solution) TIMOPTIC 0.5 % Place 1 Drop in both eyes 2 times a day.        Topiramate (Tab) TOPAMAX 100 MG Take 1 Tab by mouth 2 times a day.        TraZODone HCl (Tab) DESYREL 50 MG Take 50 mg by mouth every evening.        .                 Medicines prescribed today were sent to:     [facility] [address] [place] [address],  [place] - [address] [facility]    [address] [facility] [place] [address]    Phone: [phone] Fax: [phone]    Open 24 Hours?: No    [facility] - [place], [place] - [address] [facility]    [address] [facility] [address]    Phone: [phone] Fax: [phone]    Open 24 Hours?: No    [facility] - [place], [place] - [address] [place] [address]    [address] [facility] [address]    Phone: [phone] Fax: [phone]    Open 24 Hours?: No      Medication refill instructions:       If your prescription bottle indicates you have medication refills left, it is not necessary to call your provider’s office. Please contact your pharmacy and they will refill your medication.    If your prescription bottle indicates you do not have any refills left, you may request refills at any time through one of the following ways: The online [employer] system (except Urgent Care), by calling your provider’s office, or by asking your pharmacy to contact your provider’s office with a refill request. Medication refills are processed only during regular business hours and may not be available until the next business day. Your provider may request additional information or to have a follow-up visit with you prior to refilling your medication.   *Please Note: Medication refills are assigned a new Rx number when refilled electronically. Your pharmacy may indicate that no refills were authorized even though a new prescription for the same medication is available at the pharmacy. Please request the medicine by name with the pharmacy before contacting your provider for a refill.        Referral     A referral request has been sent to our patient care coordination department. Please allow 3-5 business days for us to process this request and contact you either by phone or mail. If you do not hear from us by the 5th business day, please call us at [phone].        Instructions    1. Continue  on your medications.    2. Follow up in 4 months with Elizabeth.       Other Notes About Your Plan     Last UDS: 6/17/15  Dr Colon  Contolled Substance agreement signed:  6/17/15 Dr Colon                 MyChart Access Code: Activation code not generated  Current MyChart Status: Active

## 2017-02-22 PROBLEM — G93.2 ELEVATED INTRACRANIAL PRESSURE: Status: RESOLVED | Noted: 2017-02-21 | Resolved: 2017-02-22

## 2017-02-22 NOTE — PROGRESS NOTES
Chief Complaint   Patient presents with   • Results     labs       HISTORY OF PRESENT ILLNESS: Patient is a 66 y.o. female established patient who presents today to be seen for acute and chronic issues.    Hypertension  The patient has had slow good weight loss over time with diet changes. She has lost 50lbs. Her blood pressure has been well controlled.    HLD (hyperlipidemia)  This is a chronic condition which is well controlled on medications. Patient is tolerating medications without side effects.    Elevated intracranial pressure  The patient has been diagnosed with increased intra-occular pressure by Ophthalmology.    Chronic kidney disease, stage 3  This is a chronic condition which is well controlled on medications. Patient is tolerating medications without side effects.    Excessive and redundant skin and subcutaneous tissue  With her weight loss, the patient has noted difficulty with skin infections of her pannus. She notes she can have irritation and redness underneath her pannus fold. We discussed referral to plastic surgery to see if she will qualify for skin removal.    Fibromyalgia  Patient had good control of her fibromyalgia through routine of decreased stress, walking, weight loss and Celebrex. She also is on pregabalin through [employer].    Moderate episode of recurrent major depressive disorder (CMS-HCC)  Patient has history of major depressive disorder and is actually followed by workers compensation for this issue. She is on medications per psychiatry.    Yeast infection of the skin  As noted, patient has had irritation and redness underneath the pannus of her skin. We discussed applying nystatin powder today.      Patient Active Problem List    Diagnosis Date Noted   • Moderate episode of recurrent major depressive disorder (CMS-HCC) 02/21/2017   • Excessive and redundant skin and subcutaneous tissue 02/21/2017   • Well woman exam with routine gynecological exam 12/02/2016   • Cyst  of right breast 12/02/2016   • Dysuria 07/27/2016   • HLD (hyperlipidemia) 04/20/2016   • Vaginal atrophy 01/21/2016   • Yeast infection of the skin 06/17/2015   • Diarrhea 05/10/2015   • Lumbar radiculopathy 05/10/2015   • Chronic kidney disease, stage 3 02/05/2015   • Breast cyst 02/04/2015   • GERD (gastroesophageal reflux disease) 02/04/2015   • Hypothyroidism 07/31/2014   • Mass of left foot 07/31/2014   • Elevated fasting glucose 07/31/2014   • Chronic back pain 03/04/2014   • Common bile duct (CBD) stricture 03/03/2014   • Vitamin D deficiency disease 11/19/2013   • Hypertension 03/06/2013   • Dyslipidemia 03/05/2013   • Fibromyalgia 03/05/2013   • Migraine 03/05/2013       Allergies:Ampicillin and Latex    Current Outpatient Prescriptions Ordered in [facility]   Medication Sig Dispense Refill   • timolol (TIMOPTIC) 0.5 % Solution Place 1 Drop in both eyes 2 times a day.     • pregabalin (LYRICA) 50 MG capsule Take 50 mg by mouth 2 times a day.     • nystatin (MYCOSTATIN) powder Apply to yeast infection in pannus up to twice per day 60 g 3   • levothyroxine (SYNTHROID) 88 MCG Tab TAKE ONE TABLET BY MOUTH ONCE DAILY 90 Tab 3   • atorvastatin (LIPITOR) 40 MG Tab Take 1 Tab by mouth every day. 90 Tab 3   • lisinopril-hydrochlorothiazide (PRINZIDE, ZESTORETIC) 20-12.5 MG per tablet Take 1 Tab by mouth every day. 90 Tab 3   • topiramate (TOPAMAX) 100 MG Tab Take 1 Tab by mouth 2 times a day. 60 Tab 4   • omeprazole (PRILOSEC) 40 MG delayed-release capsule Take 1 Cap by mouth every day. 90 Cap 3   • CALCIUM-VITAMIN D PO Take  by mouth.     • citalopram (CELEXA) 20 MG TABS Take 30 mg by mouth every day.     • trazodone (DESYREL) 50 MG TABS Take 50 mg by mouth every evening.     • clonazepam (KLONOPIN) 1 MG TABS Take 1 mg by mouth 2 times a day.     • phenazopyridine (PYRIDIUM) 200 MG Tab Take 1 Tab by mouth 3 times a day as needed for Mild Pain or Moderate Pain. 16 Tab 0   • simvastatin (ZOCOR) 40 MG Tab TAKE 1 TABLET  "EVERY EVENING 90 Tab 3   • omeprazole (PRILOSEC) 20 MG delayed-release capsule Take 20 mg by mouth every day.     • celecoxib (CELEBREX) 100 MG Cap Take 1 Cap by mouth every day. 30 Cap 3   • methylphenidate (RITALIN LA) 10 MG SR capsule Take 10 mg by mouth every morning.     • fluticasone (FLONASE) 50 MCG/ACT nasal spray Spray 2 Sprays in nose every day. 1 Bottle 4     No current Epic-ordered facility-administered medications on file.       Past Medical History   Diagnosis Date   • Post concussive syndrome      after fall in 1999   • Depression      sees Psych in [place]   • PTSD (post-traumatic stress disorder)    • Anxiety    • Fibromyalgia    • Degenerative joint disease      bulging discs in C-spine, L-spine   • History of mammogram      9/11, diagnostic showed breast cyst, rec 1 year   • Skin cancer      SCC   • Pap test, as part of routine gynecological examination      last 2008, no abn history   • S/P colonoscopy      nl at age 52, due 2013   • Common bile duct (CBD) stricture      hx of in 1990s   • Hypertension    • Hyperlipidemia        Social History   Substance Use Topics   • Smoking status: Never Smoker    • Smokeless tobacco: Never Used   • Alcohol Use: 0.0 oz/week     0 Standard drinks or equivalent per week       No family status information on file.     Family History   Problem Relation Age of Onset   • Cancer Mother        ROS:  Review of Systems   Constitutional: Negative for fever and malaise/fatigue.   HENT: Negative for congestion  Respiratory: Negative for cough  Cardiovascular: Negative for chest pain  Gastrointestinal: Negative for nausea, vomiting and abdominal pain.  Musculoskeletal: positive for episodic myalgias  All other systems reviewed and are negative except as in HPI.      Exam:  Blood pressure 110/62, pulse 64, temperature 36.5 °C (97.7 °F), height 1.626 m (5' 4\"), weight 69.128 kg (152 lb 6.4 oz), SpO2 94 %.  General:  Well nourished, well developed female in NAD  Head is grossly " normal.  Neck: Supple without JVD   Pulmonary: Clear to ausculation and percussion.  Normal effort. No rales, ronchi, or wheezing.  Cardiovascular: Regular rate and rhythm without murmur. Carotid and radial pulses are intact and equal bilaterally.  Extremities: no clubbing, cyanosis, or edema.  Skin: large abdominal panus; mild erythema in the [facility] Outpatient Visit on 02/14/2017   Component Date Value Ref Range Status   • Cholesterol,Tot 02/14/2017 167  100 - 199 mg/dL Final   • Triglycerides 02/14/2017 97  0 - 149 mg/dL Final   • HDL 02/14/2017 52  >=40 mg/dL Final   • LDL 02/14/2017 96  <100 mg/dL Final   • Sodium 02/14/2017 139  135 - 145 mmol/L Final   • Potassium 02/14/2017 4.2  3.6 - 5.5 mmol/L Final   • Chloride 02/14/2017 107  96 - 112 mmol/L Final   • Co2 02/14/2017 28  20 - 33 mmol/L Final   • Anion Gap 02/14/2017 4.0  0.0 - 11.9 Final   • Glucose 02/14/2017 95  65 - 99 mg/dL Final   • Bun 02/14/2017 28* 8 - 22 mg/dL Final   • Creatinine 02/14/2017 1.04  0.50 - 1.40 mg/dL Final   • Calcium 02/14/2017 10.5  8.5 - 10.5 mg/dL Final   • AST(SGOT) 02/14/2017 13  12 - 45 U/L Final   • ALT(SGPT) 02/14/2017 17  2 - 50 U/L Final   • Alkaline Phosphatase 02/14/2017 46  30 - 99 U/L Final   • Total Bilirubin 02/14/2017 0.4  0.1 - 1.5 mg/dL Final   • Albumin 02/14/2017 4.4  3.2 - 4.9 g/dL Final   • Total Protein 02/14/2017 6.8  6.0 - 8.2 g/dL Final   • Globulin 02/14/2017 2.4  1.9 - 3.5 g/dL Final   • A-G Ratio 02/14/2017 1.8   Final   • GFR If  02/14/2017 >60  >60 mL/min/1.73 m 2 Final   • GFR If Non  02/14/2017 53* >60 mL/min/1.73 m 2 Final   Hospital Outpatient Visit on 01/26/2017   Component Date Value Ref Range Status   • Significant Indicator 01/26/2017 POS   Final   • Source 01/26/2017 UR   Final   • Urine Culture 01/26/2017 Mixed skin stephany 10-50,000 cfu/mL*  Final   • Urine Culture 01/26/2017 *  Final                    Value:Escherichia coli  ,000 cfu/mL      Office Visit on 01/26/2017   Component Date Value Ref Range Status   • POC Color 01/26/2017 rocio  Negative Final   • POC Appearance 01/26/2017 clear  Negative Final   • POC Leukocyte Esterase 01/26/2017 neg  Negative Final   • POC Nitrites 01/26/2017 neg  Negative Final   • POC Urobiligen 01/26/2017 neg  Negative (0.2) mg/dL Final   • POC Protein 01/26/2017 neg  Negative mg/dL Final   • POC Urine PH 01/26/2017 6.0  5.0 - 8.0 Final   • POC Blood 01/26/2017 neg  Negative Final   • POC Specific Gravity 01/26/2017 1.025  <1.005 - >1.030 Final   • POC Ketones 01/26/2017 neg  Negative mg/dL Final   • POC Biliruben 01/26/2017 neg  Negative mg/dL Final   • POC Glucose 01/26/2017 neg  Negative mg/dL Final         Assessment/Plan:  1. Essential hypertension      Control, medication   2. Excessive and redundant skin and subcutaneous tissue  REFERRAL TO PLASTIC SURGERY    Uncontrolled, referred to plastic surgery   3. Yeast infection of the skin  nystatin (MYCOSTATIN) powder    Uncontrolled, refilled nystatin   4. Chronic kidney disease, stage 3      Control, monitoring   5. Fibromyalgia      Control, doing well on pregabalin   6. Moderate episode of recurrent major depressive disorder (CMS-HCC)      Control, follows with psychiatry   7. Pure hypercholesterolemia      Controlled, on statin     Please note that this dictation was created using voice recognition software. I have made every reasonable attempt to correct obvious errors, but I expect that there are errors of grammar and possibly content that I did not discover before finalizing the note.

## 2017-02-22 NOTE — ASSESSMENT & PLAN NOTE
As noted, patient has had irritation and redness underneath the pannus of her skin. We discussed applying nystatin powder today.

## 2017-02-22 NOTE — ASSESSMENT & PLAN NOTE
Patient had good control of her fibromyalgia through routine of decreased stress, walking, weight loss and Celebrex. She also is on pregabalin through [employer].

## 2017-02-22 NOTE — ASSESSMENT & PLAN NOTE
With her weight loss, the patient has noted difficulty with skin infections of her pannus. She notes she can have irritation and redness underneath her pannus fold. We discussed referral to plastic surgery to see if she will qualify for skin removal.

## 2017-02-22 NOTE — ASSESSMENT & PLAN NOTE
Patient has history of major depressive disorder and is actually followed by workers compensation for this issue. She is on medications per psychiatry.

## 2017-03-13 RX ORDER — LISINOPRIL AND HYDROCHLOROTHIAZIDE 20; 12.5 MG/1; MG/1
TABLET ORAL
Qty: 90 TAB | Refills: 3 | Status: SHIPPED | OUTPATIENT
Start: 2017-03-13 | End: 2017-12-28 | Stop reason: SDUPTHER

## 2017-03-17 ENCOUNTER — OFFICE VISIT (OUTPATIENT)
Dept: MEDICAL GROUP | Facility: PHYSICIAN GROUP | Age: 67
End: 2017-03-17
Payer: COMMERCIAL

## 2017-03-17 ENCOUNTER — HOSPITAL ENCOUNTER (OUTPATIENT)
Facility: MEDICAL CENTER | Age: 67
End: 2017-03-17
Attending: NURSE PRACTITIONER
Payer: COMMERCIAL

## 2017-03-17 VITALS
WEIGHT: 153 LBS | BODY MASS INDEX: 26.12 KG/M2 | HEART RATE: 64 BPM | SYSTOLIC BLOOD PRESSURE: 110 MMHG | TEMPERATURE: 97.5 F | HEIGHT: 64 IN | DIASTOLIC BLOOD PRESSURE: 74 MMHG | RESPIRATION RATE: 12 BRPM | OXYGEN SATURATION: 97 %

## 2017-03-17 DIAGNOSIS — J32.9 CHRONIC SINUSITIS, UNSPECIFIED LOCATION: ICD-10-CM

## 2017-03-17 DIAGNOSIS — R30.0 DYSURIA: ICD-10-CM

## 2017-03-17 DIAGNOSIS — N39.0 RECURRENT UTI (URINARY TRACT INFECTION): ICD-10-CM

## 2017-03-17 LAB
APPEARANCE UR: NORMAL
BILIRUB UR STRIP-MCNC: NORMAL MG/DL
COLOR UR AUTO: NORMAL
GLUCOSE UR STRIP.AUTO-MCNC: NORMAL MG/DL
KETONES UR STRIP.AUTO-MCNC: NORMAL MG/DL
LEUKOCYTE ESTERASE UR QL STRIP.AUTO: NORMAL
NITRITE UR QL STRIP.AUTO: POSITIVE
PH UR STRIP.AUTO: 5 [PH] (ref 5–8)
PROT UR QL STRIP: NORMAL MG/DL
RBC UR QL AUTO: NORMAL
SP GR UR STRIP.AUTO: 1.02
UROBILINOGEN UR STRIP-MCNC: 2 MG/DL

## 2017-03-17 PROCEDURE — 87077 CULTURE AEROBIC IDENTIFY: CPT

## 2017-03-17 PROCEDURE — 87086 URINE CULTURE/COLONY COUNT: CPT

## 2017-03-17 PROCEDURE — 87186 SC STD MICRODIL/AGAR DIL: CPT

## 2017-03-17 PROCEDURE — 99214 OFFICE O/P EST MOD 30 MIN: CPT | Performed by: NURSE PRACTITIONER

## 2017-03-17 PROCEDURE — 81002 URINALYSIS NONAUTO W/O SCOPE: CPT | Performed by: NURSE PRACTITIONER

## 2017-03-17 RX ORDER — CIPROFLOXACIN 500 MG/1
500 TABLET, FILM COATED ORAL DAILY
Qty: 3 TAB | Refills: 0 | Status: SHIPPED | OUTPATIENT
Start: 2017-03-17 | End: 2017-03-24 | Stop reason: SDUPTHER

## 2017-03-17 RX ORDER — PHENAZOPYRIDINE HYDROCHLORIDE 200 MG/1
200 TABLET, FILM COATED ORAL 3 TIMES DAILY PRN
Qty: 16 TAB | Refills: 0 | Status: SHIPPED | OUTPATIENT
Start: 2017-03-17 | End: 2017-04-16

## 2017-03-17 NOTE — ASSESSMENT & PLAN NOTE
Dysuria started 2 days ago, consisting burning with urination, hesitancy, frequency, Denies visible blood in urine, abdominal pain and fever. These have become frequent--since June, has had 4 UTIs. She was referred to uro-gyn but insurance did cover that specialty so we will refer to traditional urology for further work up.  Because she has recently been on Bactrim and did not tolerate Macrobid, will treat current UTI with Cipro, 500 mg once a day for 3 days and I have refilled her Pyridium. I did discuss the risks, benefits, side effects of this medication. We will send urine off for culture. I will call her if antibiotic needs to be changed.

## 2017-03-17 NOTE — PROGRESS NOTES
Chief Complaint   Patient presents with   • Dysuria         This is a 66 y.o.female patient that presents today with the following: Dysuria, chronic sinusitis    Dysuria  Dysuria started 2 days ago, consisting burning with urination, hesitancy, frequency, Denies visible blood in urine, abdominal pain and fever. These have become frequent--since June, has had 4 UTIs. She was referred to uro-gyn but insurance did cover that specialty so we will refer to traditional urology for further work up.  Because she has recently been on Bactrim and did not tolerate Macrobid, will treat current UTI with Cipro, 500 mg once a day for 3 days and I have refilled her Pyridium. I did discuss the risks, benefits, side effects of this medication. We will send urine off for culture. I will call her if antibiotic needs to be changed.    Recurrent UTI (urinary tract infection)  This is patient's fourth UTI since June 2016. Her most recent UTI was January 2017. At that time she was referred to urogynecology as she does have a prolapsed uterus which she states seems to have worsened over the last year, but unfortunately her insurance does not cover such a specialist, therefore I am referring her to traditional urologist (see additional notes). She would like to wait to be referred to gynecology to have prolapsed uterus addressed until she turns 67 when she has better insurance coverage when she switches over to [facility].    Chronic sinusitis  Patient reports symptoms of chronic sinusitis, at least for the last 3-4 months. She still has nasal congestion, bilateral maxillary sinus pain and pressure and green sinus drainage. She denies cough, fever, ear pain. I did discuss with her because her symptoms have gone on this long, symptoms are consistent with chronic sinusitis and usual treatment is nasal steroids. I have encouraged her to restart Flonase, which she states she took in the past but has never been consistent. I encouraged her  to take 2 sprays to each nostril once a day, or one spray to each nostril twice a day. I've also encouraged her to use nasal saline spray, 2 sprays to each nostril 3-4 times a day to keep nasal passages open. She should also restart over-the-counter second-generation antihistamine such as Claritin, Zyrtec or Allegra. If symptoms do not continue to improve she may need antibiotic. She has a follow-up appointment with me already scheduled in June, we will re-assess this at that time.      No visits with results within 1 Month(s) from this visit.  Latest known visit with results is:    Hospital Outpatient Visit on 02/14/2017   Component Date Value   • Cholesterol,Tot 02/14/2017 167    • Triglycerides 02/14/2017 97    • HDL 02/14/2017 52    • LDL 02/14/2017 96    • Sodium 02/14/2017 139    • Potassium 02/14/2017 4.2    • Chloride 02/14/2017 107    • Co2 02/14/2017 28    • Anion Gap 02/14/2017 4.0    • Glucose 02/14/2017 95    • Bun 02/14/2017 28*   • Creatinine 02/14/2017 1.04    • Calcium 02/14/2017 10.5    • AST(SGOT) 02/14/2017 13    • ALT(SGPT) 02/14/2017 17    • Alkaline Phosphatase 02/14/2017 46    • Total Bilirubin 02/14/2017 0.4    • Albumin 02/14/2017 4.4    • Total Protein 02/14/2017 6.8    • Globulin 02/14/2017 2.4    • A-G Ratio 02/14/2017 1.8    • GFR If  02/14/2017 >60    • GFR If Non  Ameri* 02/14/2017 53*         clinical course has fluctuated    Past Medical History   Diagnosis Date   • Post concussive syndrome      after fall in 1999   • Depression      sees Psych in [place]   • PTSD (post-traumatic stress disorder)    • Anxiety    • Fibromyalgia    • Degenerative joint disease      bulging discs in C-spine, L-spine   • History of mammogram      9/11, diagnostic showed breast cyst, rec 1 year   • Skin cancer      SCC   • Pap test, as part of routine gynecological examination      last 2008, no abn history   • S/P colonoscopy      nl at age 52, due 2013   • Common bile duct (CBD)  stricture      hx of in 1990s   • Hypertension    • Hyperlipidemia        Past Surgical History   Procedure Laterality Date   • Anna by laparoscopy     • Egd w/endoscopic ultrasound       1990s, common bile duct stricture, no stent       Family History   Problem Relation Age of Onset   • Cancer Mother        Ampicillin and Latex    Current Outpatient Prescriptions Ordered in [facility]   Medication Sig Dispense Refill   • ciprofloxacin (CIPRO) 500 MG Tab Take 1 Tab by mouth every day. 3 Tab 0   • phenazopyridine (PYRIDIUM) 200 MG Tab Take 1 Tab by mouth 3 times a day as needed for Mild Pain or Moderate Pain. 16 Tab 0   • lisinopril-hydrochlorothiazide (PRINZIDE, ZESTORETIC) 20-12.5 MG per tablet TAKE 1 TABLET DAILY 90 Tab 3   • timolol (TIMOPTIC) 0.5 % Solution Place 1 Drop in both eyes 2 times a day.     • pregabalin (LYRICA) 50 MG capsule Take 50 mg by mouth 2 times a day.     • nystatin (MYCOSTATIN) powder Apply to yeast infection in pannus up to twice per day 60 g 3   • levothyroxine (SYNTHROID) 88 MCG Tab TAKE ONE TABLET BY MOUTH ONCE DAILY 90 Tab 3   • atorvastatin (LIPITOR) 40 MG Tab Take 1 Tab by mouth every day. 90 Tab 3   • simvastatin (ZOCOR) 40 MG Tab TAKE 1 TABLET EVERY EVENING 90 Tab 3   • celecoxib (CELEBREX) 100 MG Cap Take 1 Cap by mouth every day. 30 Cap 3   • topiramate (TOPAMAX) 100 MG Tab Take 1 Tab by mouth 2 times a day. 60 Tab 4   • omeprazole (PRILOSEC) 40 MG delayed-release capsule Take 1 Cap by mouth every day. 90 Cap 3   • methylphenidate (RITALIN LA) 10 MG SR capsule Take 10 mg by mouth every morning.     • fluticasone (FLONASE) 50 MCG/ACT nasal spray Spray 2 Sprays in nose every day. 1 Bottle 4   • CALCIUM-VITAMIN D PO Take  by mouth.     • citalopram (CELEXA) 20 MG TABS Take 30 mg by mouth every day.     • trazodone (DESYREL) 50 MG TABS Take 50 mg by mouth every evening.     • clonazepam (KLONOPIN) 1 MG TABS Take 1 mg by mouth 2 times a day.     • omeprazole (PRILOSEC) 20 MG delayed-release  "capsule Take 20 mg by mouth every day.       No current [facility]-ordered facility-administered medications on file.       Constitutional ROS: No unexpected change in weight, No weakness, No unexplained fevers, sweats, or chills  Neck ROS: No lumps or masses, No swollen glands, No significant pain in neck  Pulmonary ROS: Positive per history of present illness  Cardiovascular ROS: No chest pain, No edema, No palpitations  Gastrointestinal ROS: No abdominal pain, No nausea, vomiting, diarrhea, or constipation, no blood in stool  Musculoskeletal/Extremities ROS: No clubbing, No peripheral edema, No pain, redness or swelling on the joints  Neurologic ROS: Normal development, No seizures, No weakness  Genitourinary ROS: Positive per history of present illness    Physical exam:  /74 mmHg  Pulse 64  Temp(Src) 36.4 °C (97.5 °F)  Resp 12  Ht 1.626 m (5' 4.02\")  Wt 69.4 kg (153 lb)  BMI 26.25 kg/m2  SpO2 97%  General Appearance: Middle-aged older female, alert, no distress, mildly overweight, well-groomed  Skin: Skin color, texture, turgor normal. No rashes or lesions.  Nose/Sinuses: positive findings: sinus tenderness bilateral bilateral maxillary  Lungs: negative findings: normal respiratory rate and rhythm, lungs clear to auscultation  Heart: negative. RRR without murmur, gallop, or rubs.  No ectopy.  Abdomen: Abdomen soft, non-tender. BS normal. No masses,  No organomegaly  Musculoskeletal: negative  Neurologic: intact, oriented, mood appropriate, judgment intact. Cranial nerves II-12 grossly intact    Medical decision making/discussion: Patient here for dysuria, in office urinalysis done, we'll send off for culture. Start on ciprofloxacin, 500 mg once a day for 3 days. We did discuss risks, benefits and common side effects of this medication. Pyridium was refilled. We will refer to urology for recurrent UTIs. For chronic sinusitis, I have counseled her on restarting the Flonase, 2 sprays to each nostril once a " day, or one spray to each nostril twice a day. She is also to use nasal saline, 2 sprays to each nostril up to 3-4 times a day for nasal congestion. She can also start an over-the-counter second-generation antihistamine as well. If her symptoms do not improve she may need an antibiotic. She is to call for appointment if symptoms do not improve.       Elizabeth was seen today for dysuria.    Diagnoses and all orders for this visit:    Dysuria  -     ciprofloxacin (CIPRO) 500 MG Tab; Take 1 Tab by mouth every day.  -     phenazopyridine (PYRIDIUM) 200 MG Tab; Take 1 Tab by mouth 3 times a day as needed for Mild Pain or Moderate Pain.  -     POCT Urinalysis  -     URINE CULTURE(NEW); Future    Recurrent UTI (urinary tract infection)  -     ciprofloxacin (CIPRO) 500 MG Tab; Take 1 Tab by mouth every day.  -     REFERRAL TO UROLOGY  -     POCT Urinalysis  -     URINE CULTURE(NEW); Future    Chronic sinusitis, unspecified location          Please note that this dictation was created using voice recognition software. I have made every reasonable attempt to correct obvious errors, but I expect that there are errors of grammar and possibly content that I did not discover before finalizing the note.

## 2017-03-17 NOTE — ASSESSMENT & PLAN NOTE
This is patient's fourth UTI since June 2016. Her most recent UTI was January 2017. At that time she was referred to urogynecology as she does have a prolapsed uterus which she states seems to have worsened over the last year, but unfortunately her insurance does not cover such a specialist, therefore I am referring her to traditional urologist (see additional notes). She would like to wait to be referred to gynecology to have prolapsed uterus addressed until she turns 67 when she has better insurance coverage when she switches over to [facility] [employer].

## 2017-03-17 NOTE — MR AVS SNAPSHOT
"        Elizabeth Murphy   3/17/2017 9:00 AM   Office Visit   MRN: [id number]    Department:  [facility]   Dept Phone:  [phone]    Description:  Female : 1950   Provider:  TERRI Serna           Reason for Visit     Dysuria           Allergies as of 3/17/2017     Allergen Noted Reactions    Ampicillin 2015   Diarrhea    Patient stated.    Latex 2015   Hives      You were diagnosed with     Dysuria   [788.1.ICD-9-CM]       Recurrent UTI (urinary tract infection)   [623619]         Vital Signs     Blood Pressure Pulse Temperature Respirations Height Weight    110/74 mmHg 64 36.4 °C (97.5 °F) 12 1.626 m (5' 4.02\") 69.4 kg (153 lb)    Body Mass Index Oxygen Saturation Smoking Status             26.25 kg/m2 97% Never Smoker          Basic Information     Date Of Birth Sex Race Ethnicity Preferred Language    1950 Female White Non- English      Your appointments     2017  2:20 PM   Established Patient with TERRI Serna   [address] [address] [phone]           You will be receiving a confirmation call a few days before your appointment from our automated call confirmation system.              Problem List              ICD-10-CM Priority Class Noted - Resolved    Dyslipidemia E78.5   3/5/2013 - Present    Fibromyalgia M79.7   3/5/2013 - Present    Migraine    3/5/2013 - Present    Hypertension I10   3/6/2013 - Present    Vitamin D deficiency disease E55.9   2013 - Present    Common bile duct (CBD) stricture K83.1   3/3/2014 - Present    Chronic back pain M54.9, G89.29   3/4/2014 - Present    Hypothyroidism E03.9   2014 - Present    Mass of left foot R22.42   2014 - Present    Elevated fasting glucose R73.01   2014 - Present    Breast cyst N60.09   2015 - Present    GERD (gastroesophageal reflux disease) K21.9   2015 - Present    Chronic " kidney disease, stage 3 N18.3   2/5/2015 - Present    Diarrhea R19.7   5/10/2015 - Present    Lumbar radiculopathy M54.16   5/10/2015 - Present    Yeast infection of the skin B37.2   6/17/2015 - Present    Vaginal atrophy N95.2   1/21/2016 - Present    HLD (hyperlipidemia) E78.5   4/20/2016 - Present    Dysuria R30.0   7/27/2016 - Present    Well woman exam with routine gynecological exam Z01.419   12/2/2016 - Present    Cyst of right breast N60.01   12/2/2016 - Present    Moderate episode of recurrent major depressive disorder (CMS-HCC) F33.1   2/21/2017 - Present    Excessive and redundant skin and subcutaneous tissue L98.7   2/21/2017 - Present      Health Maintenance        Date Due Completion Dates    IMM DTaP/Tdap/Td Vaccine (1 - Tdap) 9/27/1971 ---    IMM ZOSTER VACCINE 9/27/2012 ---    MAMMOGRAM 12/15/2017 12/15/2016, 5/9/2016, 4/27/2016, 4/7/2015, 12/11/2013, 10/25/2011, 10/7/2011    PAP SMEAR 12/2/2019 12/2/2016, 2/1/2012    COLONOSCOPY 10/8/2024 10/8/2014            Current Immunizations     Influenza Vaccine Quad Inj (Pf) 1/20/2016, 10/9/2014 10:30 AM    Influenza Vaccine Quad Inj (Preserved) 10/31/2016, 10/20/2015      Below and/or attached are the medications your provider expects you to take. Review all of your home medications and newly ordered medications with your provider and/or pharmacist. Follow medication instructions as directed by your provider and/or pharmacist. Please keep your medication list with you and share with your provider. Update the information when medications are discontinued, doses are changed, or new medications (including over-the-counter products) are added; and carry medication information at all times in the event of emergency situations     Allergies:  AMPICILLIN - Diarrhea     LATEX - Hives               Medications  Valid as of: March 17, 2017 -  9:53 AM    Generic Name Brand Name Tablet Size Instructions for use    Atorvastatin Calcium (Tab) LIPITOR 40 MG Take 1 Tab by  mouth every day.        Calcium-Vitamin D   Take  by mouth.        Celecoxib (Cap) CELEBREX 100 MG Take 1 Cap by mouth every day.        Ciprofloxacin HCl (Tab) CIPRO 500 MG Take 1 Tab by mouth every day.        Citalopram Hydrobromide (Tab) CELEXA 20 MG Take 30 mg by mouth every day.        ClonazePAM (Tab) KLONOPIN 1 MG Take 1 mg by mouth 2 times a day.        Fluticasone Propionate (Suspension) FLONASE 50 MCG/ACT Spray 2 Sprays in nose every day.        Levothyroxine Sodium (Tab) SYNTHROID 88 MCG TAKE ONE TABLET BY MOUTH ONCE DAILY        Lisinopril-Hydrochlorothiazide (Tab) PRINZIDE, ZESTORETIC 20-12.5 MG TAKE 1 TABLET DAILY        Methylphenidate HCl (CAPSULE SR 24 HR) RITALIN LA 10 MG Take 10 mg by mouth every morning.        Nystatin (Powder) MYCOSTATIN 648780 UNIT/GM Apply to yeast infection in pannus up to twice per day        Omeprazole (CAPSULE DELAYED RELEASE) PRILOSEC 40 MG Take 1 Cap by mouth every day.        Omeprazole (CAPSULE DELAYED RELEASE) PRILOSEC 20 MG Take 20 mg by mouth every day.        Phenazopyridine HCl (Tab) PYRIDIUM 200 MG Take 1 Tab by mouth 3 times a day as needed for Mild Pain or Moderate Pain.        Pregabalin (Cap) LYRICA 50 MG Take 50 mg by mouth 2 times a day.        Simvastatin (Tab) ZOCOR 40 MG TAKE 1 TABLET EVERY EVENING        Timolol Maleate (Solution) TIMOPTIC 0.5 % Place 1 Drop in both eyes 2 times a day.        Topiramate (Tab) TOPAMAX 100 MG Take 1 Tab by mouth 2 times a day.        TraZODone HCl (Tab) DESYREL 50 MG Take 50 mg by mouth every evening.        .                 Medicines prescribed today were sent to:     [facility], [place] - [address] [facility]    [address] [facility] [address]    Phone: [phone] Fax: [phone]    Open 24 Hours?: No    [facility] - [place], [place] - [address] [facility]    [address] [facility] [address]    Phone: [phone] Fax: [phone]    Open 24 Hours?: No     [facility] - [place], [place] - [address] [place] [address]    [address] [facility] [address]    Phone: [phone] Fax: [phone]    Open 24 Hours?: No      Medication refill instructions:       If your prescription bottle indicates you have medication refills left, it is not necessary to call your provider’s office. Please contact your pharmacy and they will refill your medication.    If your prescription bottle indicates you do not have any refills left, you may request refills at any time through one of the following ways: The online [employer] system (except Urgent Care), by calling your provider’s office, or by asking your pharmacy to contact your provider’s office with a refill request. Medication refills are processed only during regular business hours and may not be available until the next business day. Your provider may request additional information or to have a follow-up visit with you prior to refilling your medication.   *Please Note: Medication refills are assigned a new Rx number when refilled electronically. Your pharmacy may indicate that no refills were authorized even though a new prescription for the same medication is available at the pharmacy. Please request the medicine by name with the pharmacy before contacting your provider for a refill.        Referral     A referral request has been sent to our patient care coordination department. Please allow 3-5 business days for us to process this request and contact you either by phone or mail. If you do not hear from us by the 5th business day, please call us at [phone].        Instructions    Antibiotics--3 days, take with probiotic    Referral to urology           Other Notes About Your Plan     Last UDS: 6/17/15  Dr Colon  Contolled Substance agreement signed:  6/17/15 Dr Darlene Botello Access Code: Activation code not generated  Current [employer] Status: Active

## 2017-03-17 NOTE — ASSESSMENT & PLAN NOTE
Patient reports symptoms of chronic sinusitis, at least for the last 3-4 months. She still has nasal congestion, bilateral maxillary sinus pain and pressure and green sinus drainage. She denies cough, fever, ear pain. I did discuss with her because her symptoms have gone on this long, symptoms are consistent with chronic sinusitis and usual treatment is nasal steroids. I have encouraged her to restart Flonase, which she states she took in the past but has never been consistent. I encouraged her to take 2 sprays to each nostril once a day, or one spray to each nostril twice a day. I've also encouraged her to use nasal saline spray, 2 sprays to each nostril 3-4 times a day to keep nasal passages open. She should also restart over-the-counter second-generation antihistamine such as Claritin, Zyrtec or Allegra. If symptoms do not continue to improve she may need antibiotic. She has a follow-up appointment with me already scheduled in June, we will re-assess this at that time.

## 2017-03-24 DIAGNOSIS — N39.0 RECURRENT UTI (URINARY TRACT INFECTION): ICD-10-CM

## 2017-03-24 DIAGNOSIS — R30.0 DYSURIA: ICD-10-CM

## 2017-03-24 RX ORDER — CIPROFLOXACIN 500 MG/1
500 TABLET, FILM COATED ORAL DAILY
Qty: 3 TAB | Refills: 0 | Status: SHIPPED | OUTPATIENT
Start: 2017-03-24 | End: 2017-04-16

## 2017-04-06 ENCOUNTER — TELEPHONE (OUTPATIENT)
Dept: MEDICAL GROUP | Facility: PHYSICIAN GROUP | Age: 67
End: 2017-04-06

## 2017-04-06 NOTE — TELEPHONE ENCOUNTER
----- Message from TERRI Serna sent at 4/3/2017  3:54 PM PDT -----  Regarding: FW: Non-Urgent Medical Question  Contact: [phone]  Please see message below....Is there anything else that I need to do? I do not have to do a new referral do I?  ----- Message -----     From: Joaquina Chandra, Med Ass't     Sent: 4/3/2017   3:52 PM       To: TERRI Serna  Subject: FW: Non-Urgent Medical Question                      ----- Message -----     From: Elizabeth Murphy     Sent: 4/1/2017  12:51 PM       To: Maria Isabel Aly  Subject: Non-Urgent Medical Question                      Hi RE; Referral to Urology....called [facility] number and they don't have Urology dept.so referred me to [facility].  Have appt for Apr 17 with Birdie Velasquez.  She suggested I contact you so you could send a referral to their office and records concerning the UTIs.  Appreciate you doing this . The address is [address] [place] [address]   Thanks for the help.   Elizabeth Murphy  I authorize you to send any medical records to [facility].

## 2017-04-06 NOTE — TELEPHONE ENCOUNTER
Made contact with Elizabeth regarding her referral for urology. She has appointment and Elizabeth does not anything at this time.

## 2017-04-16 ENCOUNTER — APPOINTMENT (OUTPATIENT)
Dept: RADIOLOGY | Facility: MEDICAL CENTER | Age: 67
DRG: 481 | End: 2017-04-16
Attending: EMERGENCY MEDICINE
Payer: COMMERCIAL

## 2017-04-16 ENCOUNTER — APPOINTMENT (OUTPATIENT)
Dept: RADIOLOGY | Facility: MEDICAL CENTER | Age: 67
DRG: 481 | End: 2017-04-16
Attending: ORTHOPAEDIC SURGERY
Payer: COMMERCIAL

## 2017-04-16 ENCOUNTER — RESOLUTE PROFESSIONAL BILLING HOSPITAL PROF FEE (OUTPATIENT)
Dept: HOSPITALIST | Facility: MEDICAL CENTER | Age: 67
End: 2017-04-16
Payer: COMMERCIAL

## 2017-04-16 ENCOUNTER — HOSPITAL ENCOUNTER (INPATIENT)
Facility: MEDICAL CENTER | Age: 67
LOS: 3 days | DRG: 481 | End: 2017-04-19
Attending: EMERGENCY MEDICINE | Admitting: HOSPITALIST
Payer: COMMERCIAL

## 2017-04-16 DIAGNOSIS — S72.001A HIP FRACTURE, RIGHT, CLOSED, INITIAL ENCOUNTER (HCC): ICD-10-CM

## 2017-04-16 PROBLEM — S72.009A HIP FRACTURE (HCC): Status: ACTIVE | Noted: 2017-04-16

## 2017-04-16 LAB
ALBUMIN SERPL BCP-MCNC: 4.4 G/DL (ref 3.2–4.9)
ALBUMIN/GLOB SERPL: 1.8 G/DL
ALP SERPL-CCNC: 55 U/L (ref 30–99)
ALT SERPL-CCNC: 16 U/L (ref 2–50)
ANION GAP SERPL CALC-SCNC: 6 MMOL/L (ref 0–11.9)
APTT PPP: 27.3 SEC (ref 24.7–36)
AST SERPL-CCNC: 14 U/L (ref 12–45)
BASOPHILS # BLD AUTO: 0.2 % (ref 0–1.8)
BASOPHILS # BLD: 0.02 K/UL (ref 0–0.12)
BILIRUB SERPL-MCNC: 0.3 MG/DL (ref 0.1–1.5)
BUN SERPL-MCNC: 20 MG/DL (ref 8–22)
CALCIUM SERPL-MCNC: 9.6 MG/DL (ref 8.5–10.5)
CHLORIDE SERPL-SCNC: 106 MMOL/L (ref 96–112)
CO2 SERPL-SCNC: 27 MMOL/L (ref 20–33)
CREAT SERPL-MCNC: 0.99 MG/DL (ref 0.5–1.4)
EOSINOPHIL # BLD AUTO: 0.09 K/UL (ref 0–0.51)
EOSINOPHIL NFR BLD: 0.9 % (ref 0–6.9)
ERYTHROCYTE [DISTWIDTH] IN BLOOD BY AUTOMATED COUNT: 41.4 FL (ref 35.9–50)
GFR SERPL CREATININE-BSD FRML MDRD: 56 ML/MIN/1.73 M 2
GLOBULIN SER CALC-MCNC: 2.5 G/DL (ref 1.9–3.5)
GLUCOSE SERPL-MCNC: 123 MG/DL (ref 65–99)
HCT VFR BLD AUTO: 38.1 % (ref 37–47)
HGB BLD-MCNC: 12.7 G/DL (ref 12–16)
IMM GRANULOCYTES # BLD AUTO: 0.04 K/UL (ref 0–0.11)
IMM GRANULOCYTES NFR BLD AUTO: 0.4 % (ref 0–0.9)
INR PPP: 0.93 (ref 0.87–1.13)
LYMPHOCYTES # BLD AUTO: 1.95 K/UL (ref 1–4.8)
LYMPHOCYTES NFR BLD: 20.4 % (ref 22–41)
MCH RBC QN AUTO: 31.7 PG (ref 27–33)
MCHC RBC AUTO-ENTMCNC: 33.3 G/DL (ref 33.6–35)
MCV RBC AUTO: 95 FL (ref 81.4–97.8)
MONOCYTES # BLD AUTO: 0.36 K/UL (ref 0–0.85)
MONOCYTES NFR BLD AUTO: 3.8 % (ref 0–13.4)
NEUTROPHILS # BLD AUTO: 7.12 K/UL (ref 2–7.15)
NEUTROPHILS NFR BLD: 74.3 % (ref 44–72)
NRBC # BLD AUTO: 0 K/UL
NRBC BLD AUTO-RTO: 0 /100 WBC
PLATELET # BLD AUTO: 226 K/UL (ref 164–446)
PMV BLD AUTO: 10 FL (ref 9–12.9)
POTASSIUM SERPL-SCNC: 3.7 MMOL/L (ref 3.6–5.5)
PROT SERPL-MCNC: 6.9 G/DL (ref 6–8.2)
PROTHROMBIN TIME: 12.7 SEC (ref 12–14.6)
RBC # BLD AUTO: 4.01 M/UL (ref 4.2–5.4)
SODIUM SERPL-SCNC: 139 MMOL/L (ref 135–145)
WBC # BLD AUTO: 9.6 K/UL (ref 4.8–10.8)

## 2017-04-16 PROCEDURE — 51702 INSERT TEMP BLADDER CATH: CPT

## 2017-04-16 PROCEDURE — 93005 ELECTROCARDIOGRAM TRACING: CPT | Performed by: EMERGENCY MEDICINE

## 2017-04-16 PROCEDURE — 700105 HCHG RX REV CODE 258: Performed by: HOSPITALIST

## 2017-04-16 PROCEDURE — 770006 HCHG ROOM/CARE - MED/SURG/GYN SEMI*

## 2017-04-16 PROCEDURE — 700101 HCHG RX REV CODE 250: Performed by: HOSPITALIST

## 2017-04-16 PROCEDURE — 96375 TX/PRO/DX INJ NEW DRUG ADDON: CPT

## 2017-04-16 PROCEDURE — 700111 HCHG RX REV CODE 636 W/ 250 OVERRIDE (IP): Performed by: HOSPITALIST

## 2017-04-16 PROCEDURE — 303105 HCHG CATHETER EXTRA

## 2017-04-16 PROCEDURE — A9270 NON-COVERED ITEM OR SERVICE: HCPCS | Performed by: HOSPITALIST

## 2017-04-16 PROCEDURE — 73562 X-RAY EXAM OF KNEE 3: CPT | Mod: RT

## 2017-04-16 PROCEDURE — 700102 HCHG RX REV CODE 250 W/ 637 OVERRIDE(OP): Performed by: HOSPITALIST

## 2017-04-16 PROCEDURE — 85730 THROMBOPLASTIN TIME PARTIAL: CPT

## 2017-04-16 PROCEDURE — 80053 COMPREHEN METABOLIC PANEL: CPT

## 2017-04-16 PROCEDURE — 99223 1ST HOSP IP/OBS HIGH 75: CPT | Performed by: HOSPITALIST

## 2017-04-16 PROCEDURE — 96374 THER/PROPH/DIAG INJ IV PUSH: CPT

## 2017-04-16 PROCEDURE — 73552 X-RAY EXAM OF FEMUR 2/>: CPT | Mod: RT

## 2017-04-16 PROCEDURE — 71010 DX-CHEST-LIMITED (1 VIEW): CPT

## 2017-04-16 PROCEDURE — 85610 PROTHROMBIN TIME: CPT

## 2017-04-16 PROCEDURE — 73130 X-RAY EXAM OF HAND: CPT | Mod: RT

## 2017-04-16 PROCEDURE — 700111 HCHG RX REV CODE 636 W/ 250 OVERRIDE (IP): Performed by: EMERGENCY MEDICINE

## 2017-04-16 PROCEDURE — 73110 X-RAY EXAM OF WRIST: CPT | Mod: RT

## 2017-04-16 PROCEDURE — 73502 X-RAY EXAM HIP UNI 2-3 VIEWS: CPT | Mod: RT

## 2017-04-16 PROCEDURE — 99285 EMERGENCY DEPT VISIT HI MDM: CPT

## 2017-04-16 PROCEDURE — 85025 COMPLETE CBC W/AUTO DIFF WBC: CPT

## 2017-04-16 RX ORDER — OMEPRAZOLE 20 MG/1
20 CAPSULE, DELAYED RELEASE ORAL DAILY
Status: DISCONTINUED | OUTPATIENT
Start: 2017-04-17 | End: 2017-04-19 | Stop reason: HOSPADM

## 2017-04-16 RX ORDER — LISINOPRIL AND HYDROCHLOROTHIAZIDE 20; 12.5 MG/1; MG/1
1 TABLET ORAL DAILY
Status: DISCONTINUED | OUTPATIENT
Start: 2017-04-16 | End: 2017-04-16

## 2017-04-16 RX ORDER — HYDROCHLOROTHIAZIDE 12.5 MG/1
12.5 TABLET ORAL
Status: DISCONTINUED | OUTPATIENT
Start: 2017-04-17 | End: 2017-04-19 | Stop reason: HOSPADM

## 2017-04-16 RX ORDER — CIPROFLOXACIN 500 MG/1
500 TABLET, FILM COATED ORAL 2 TIMES DAILY
COMMUNITY
End: 2017-04-16

## 2017-04-16 RX ORDER — AMOXICILLIN 250 MG
2 CAPSULE ORAL 2 TIMES DAILY
Status: DISCONTINUED | OUTPATIENT
Start: 2017-04-16 | End: 2017-04-19 | Stop reason: HOSPADM

## 2017-04-16 RX ORDER — PREGABALIN 25 MG/1
50 CAPSULE ORAL 2 TIMES DAILY
Status: DISCONTINUED | OUTPATIENT
Start: 2017-04-16 | End: 2017-04-19 | Stop reason: HOSPADM

## 2017-04-16 RX ORDER — CIPROFLOXACIN 500 MG/1
500 TABLET, FILM COATED ORAL DAILY
COMMUNITY
End: 2017-04-16

## 2017-04-16 RX ORDER — CITALOPRAM 20 MG/1
20 TABLET ORAL DAILY
COMMUNITY
End: 2019-06-26 | Stop reason: SDUPTHER

## 2017-04-16 RX ORDER — ACETAMINOPHEN 325 MG/1
650 TABLET ORAL EVERY 6 HOURS PRN
Status: DISCONTINUED | OUTPATIENT
Start: 2017-04-16 | End: 2017-04-19 | Stop reason: HOSPADM

## 2017-04-16 RX ORDER — DEXTROSE AND SODIUM CHLORIDE 5; .9 G/100ML; G/100ML
INJECTION, SOLUTION INTRAVENOUS CONTINUOUS
Status: DISCONTINUED | OUTPATIENT
Start: 2017-04-16 | End: 2017-04-17

## 2017-04-16 RX ORDER — CLONAZEPAM 1 MG/1
1 TABLET ORAL EVERY EVENING
Status: DISCONTINUED | OUTPATIENT
Start: 2017-04-16 | End: 2017-04-19 | Stop reason: HOSPADM

## 2017-04-16 RX ORDER — BISACODYL 10 MG
10 SUPPOSITORY, RECTAL RECTAL
Status: DISCONTINUED | OUTPATIENT
Start: 2017-04-16 | End: 2017-04-19 | Stop reason: HOSPADM

## 2017-04-16 RX ORDER — MORPHINE SULFATE 4 MG/ML
4 INJECTION, SOLUTION INTRAMUSCULAR; INTRAVENOUS ONCE
Status: COMPLETED | OUTPATIENT
Start: 2017-04-16 | End: 2017-04-16

## 2017-04-16 RX ORDER — ONDANSETRON 2 MG/ML
4 INJECTION INTRAMUSCULAR; INTRAVENOUS ONCE
Status: COMPLETED | OUTPATIENT
Start: 2017-04-16 | End: 2017-04-16

## 2017-04-16 RX ORDER — OXYCODONE HYDROCHLORIDE 5 MG/1
5 TABLET ORAL
Status: DISCONTINUED | OUTPATIENT
Start: 2017-04-16 | End: 2017-04-19 | Stop reason: HOSPADM

## 2017-04-16 RX ORDER — ATORVASTATIN CALCIUM 40 MG/1
40 TABLET, FILM COATED ORAL
Status: DISCONTINUED | OUTPATIENT
Start: 2017-04-16 | End: 2017-04-19 | Stop reason: HOSPADM

## 2017-04-16 RX ORDER — OXYCODONE HYDROCHLORIDE 10 MG/1
10 TABLET ORAL
Status: DISCONTINUED | OUTPATIENT
Start: 2017-04-16 | End: 2017-04-19 | Stop reason: HOSPADM

## 2017-04-16 RX ORDER — POLYETHYLENE GLYCOL 3350 17 G/17G
1 POWDER, FOR SOLUTION ORAL
Status: DISCONTINUED | OUTPATIENT
Start: 2017-04-16 | End: 2017-04-19 | Stop reason: HOSPADM

## 2017-04-16 RX ORDER — CLONAZEPAM 1 MG/1
0.5 TABLET ORAL 2 TIMES DAILY
Status: DISCONTINUED | OUTPATIENT
Start: 2017-04-17 | End: 2017-04-19 | Stop reason: HOSPADM

## 2017-04-16 RX ORDER — METHYLPHENIDATE HYDROCHLORIDE 5 MG/1
5 TABLET ORAL 2 TIMES DAILY
COMMUNITY
End: 2017-06-21

## 2017-04-16 RX ORDER — TOPIRAMATE 100 MG/1
100 TABLET, FILM COATED ORAL 2 TIMES DAILY
Status: DISCONTINUED | OUTPATIENT
Start: 2017-04-16 | End: 2017-04-19 | Stop reason: HOSPADM

## 2017-04-16 RX ORDER — SODIUM CHLORIDE 9 MG/ML
1000 INJECTION, SOLUTION INTRAVENOUS CONTINUOUS
Status: DISCONTINUED | OUTPATIENT
Start: 2017-04-16 | End: 2017-04-19 | Stop reason: HOSPADM

## 2017-04-16 RX ORDER — CITALOPRAM 20 MG/1
20 TABLET ORAL DAILY
Status: DISCONTINUED | OUTPATIENT
Start: 2017-04-17 | End: 2017-04-19 | Stop reason: HOSPADM

## 2017-04-16 RX ORDER — CLONAZEPAM 1 MG/1
0.5 TABLET ORAL 2 TIMES DAILY
COMMUNITY
End: 2018-10-23 | Stop reason: SDUPTHER

## 2017-04-16 RX ORDER — MORPHINE SULFATE 4 MG/ML
4 INJECTION, SOLUTION INTRAMUSCULAR; INTRAVENOUS
Status: DISCONTINUED | OUTPATIENT
Start: 2017-04-16 | End: 2017-04-19 | Stop reason: HOSPADM

## 2017-04-16 RX ORDER — LISINOPRIL 20 MG/1
20 TABLET ORAL
Status: DISCONTINUED | OUTPATIENT
Start: 2017-04-17 | End: 2017-04-19 | Stop reason: HOSPADM

## 2017-04-16 RX ORDER — HEPARIN SODIUM 5000 [USP'U]/ML
5000 INJECTION, SOLUTION INTRAVENOUS; SUBCUTANEOUS EVERY 8 HOURS
Status: DISCONTINUED | OUTPATIENT
Start: 2017-04-16 | End: 2017-04-17

## 2017-04-16 RX ORDER — TRAZODONE HYDROCHLORIDE 50 MG/1
50 TABLET ORAL NIGHTLY
Status: DISCONTINUED | OUTPATIENT
Start: 2017-04-16 | End: 2017-04-19 | Stop reason: HOSPADM

## 2017-04-16 RX ORDER — METHYLPHENIDATE HYDROCHLORIDE 5 MG/1
5 TABLET ORAL 2 TIMES DAILY
Status: DISCONTINUED | OUTPATIENT
Start: 2017-04-17 | End: 2017-04-19 | Stop reason: HOSPADM

## 2017-04-16 RX ADMIN — CLONAZEPAM 1 MG: 1 TABLET ORAL at 21:00

## 2017-04-16 RX ADMIN — OXYCODONE HYDROCHLORIDE 10 MG: 5 TABLET ORAL at 19:54

## 2017-04-16 RX ADMIN — DEXTROSE AND SODIUM CHLORIDE: 5; .9 INJECTION, SOLUTION INTRAVENOUS at 19:00

## 2017-04-16 RX ADMIN — ONDANSETRON 4 MG: 2 INJECTION INTRAMUSCULAR; INTRAVENOUS at 16:56

## 2017-04-16 RX ADMIN — PREGABALIN 50 MG: 25 CAPSULE ORAL at 22:03

## 2017-04-16 RX ADMIN — TOPIRAMATE 100 MG: 100 TABLET ORAL at 22:03

## 2017-04-16 RX ADMIN — SODIUM CHLORIDE 1000 ML: 9 INJECTION, SOLUTION INTRAVENOUS at 22:06

## 2017-04-16 RX ADMIN — TRAZODONE HYDROCHLORIDE 50 MG: 50 TABLET ORAL at 22:04

## 2017-04-16 RX ADMIN — MORPHINE SULFATE 4 MG: 4 INJECTION INTRAVENOUS at 16:58

## 2017-04-16 RX ADMIN — STANDARDIZED SENNA CONCENTRATE AND DOCUSATE SODIUM 2 TABLET: 8.6; 5 TABLET, FILM COATED ORAL at 22:04

## 2017-04-16 RX ADMIN — OXYCODONE HYDROCHLORIDE 10 MG: 5 TABLET ORAL at 23:28

## 2017-04-16 RX ADMIN — ATORVASTATIN CALCIUM 40 MG: 20 TABLET, FILM COATED ORAL at 22:03

## 2017-04-16 RX ADMIN — HEPARIN SODIUM 5000 UNITS: 5000 INJECTION, SOLUTION INTRAVENOUS; SUBCUTANEOUS at 22:04

## 2017-04-16 ASSESSMENT — PAIN SCALES - GENERAL
PAINLEVEL_OUTOF10: 6
PAINLEVEL_OUTOF10: 7
PAINLEVEL_OUTOF10: 7
PAINLEVEL_OUTOF10: 8

## 2017-04-16 ASSESSMENT — LIFESTYLE VARIABLES
ALCOHOL_USE: NO
EVER_SMOKED: NEVER

## 2017-04-16 NOTE — IP AVS SNAPSHOT
4/19/2017    Elizabeth Cassie Murphy  [address] [facility] [address]    Dear Elizabeth:    [facility] wants to ensure your discharge home is safe and you or your loved ones have had all of your questions answered regarding your care after you leave the hospital.    Below is a list of resources and contact information should you have any questions regarding your hospital stay, follow-up instructions, or active medical symptoms.    Questions or Concerns Regarding… Contact   Medical Questions Related to Your Discharge  (7 days a week, 8am-5pm) Contact a Nurse Care Coordinator   [phone]   Medical Questions Not Related to Your Discharge  (24 hours a day / 7 days a week)  Contact the Nurse Health Line   [phone]    Medications or Discharge Instructions Refer to your discharge packet   or contact your [facility] Primary Care Provider   [phone]   Follow-up Appointment(s) Schedule your appointment via [employer]   or contact Scheduling [phone]   Billing Review your statement via [employer]  or contact Billing [phone]   Medical Records Review your records via [employer]   or contact Medical Records [phone]     You may receive a telephone call within two days of discharge. This call is to make certain you understand your discharge instructions and have the opportunity to have any questions answered. You can also easily access your medical information, test results and upcoming appointments via the [employer] free online health management tool. You can learn more and sign up at [employer]. For assistance setting up your [employer] account, please call [phone].    Once again, we want to ensure your discharge home is safe and that you have a clear understanding of any next steps in your care. If you have any questions or concerns, please do not hesitate to contact us, we are here for you. Thank you for choosing [facility] for your healthcare needs.    Sincerely,    Your [facility] Healthcare Team

## 2017-04-16 NOTE — IP AVS SNAPSHOT
" <p align=\"LEFT\"><IMG SRC=\"//[email or web address]\" alt=\"Image\" WIDTH=\"50%\" HEIGHT=\"200\" BORDER=\"\"></p>                   Name:Elizabeth Murphy  Medical Record Number:[id number]  CSN: [id number]    YOB: 1950   Age: 66 y.o.  Sex: female  HT:1.626 m (5' 4\") WT: 69.4 kg (153 lb)          Admit Date: 4/16/2017     Discharge Date:   Today's Date: 4/19/2017  Attending Doctor:  Mike Dallas M.D.                  Allergies:  Ampicillin and Latex          Your appointments     Jun 21, 2017  2:20 PM   Established Patient with TERRI Serna   [address] [address] [phone]           You will be receiving a confirmation call a few days before your appointment from our automated call confirmation system.              Follow-up Information     1. Follow up with Dhaval Poole M.D. On 4/2/2017.    Specialty:  Orthopaedics    Contact information    [address]  [address]  [facility] [address] [phone]          2. Please follow up.    Why:  8:30, For wound re-check         Medication List      Take these Medications        Instructions    atorvastatin 40 MG Tabs   Commonly known as:  LIPITOR    Take 1 Tab by mouth every day.   Dose:  40 mg       CALCIUM-VITAMIN D PO    Take  by mouth.       celecoxib 100 MG Caps   Commonly known as:  CELEBREX    Take 1 Cap by mouth every day.   Dose:  100 mg       citalopram 20 MG Tabs   Commonly known as:  CELEXA    Take 20 mg by mouth every day.   Dose:  20 mg       clonazepam 1 MG Tabs   Commonly known as:  KLONOPIN    Take 0.5-1 mg by mouth 3 times a day. 0.5mg in am, 0.5mg 1400, 1mg at bedtime.   Dose:  0.5-1 mg       levothyroxine 88 MCG Tabs   Commonly known as:  SYNTHROID    TAKE ONE TABLET BY MOUTH ONCE DAILY       lisinopril-hydrochlorothiazide 20-12.5 MG per tablet   Commonly known as:  PRINZIDE, ZESTORETIC    TAKE 1 TABLET DAILY       LYRICA 50 MG capsule   Generic drug:  " pregabalin    Take 50 mg by mouth 2 times a day.   Dose:  50 mg       methylphenidate 5 MG Tabs   Commonly known as:  RITALIN    Take 5 mg by mouth 2 times a day. 5mg in am, 5mg at noon.   Dose:  5 mg       omeprazole 20 MG delayed-release capsule   Commonly known as:  PRILOSEC    Take 20 mg by mouth every day.   Dose:  20 mg       oxycodone immediate-release 5 MG Tabs   Commonly known as:  ROXICODONE    Take 1 Tab by mouth every 3 hours as needed (Moderate Pain (NRS Pain Scale 4-6; CPOT Pain Scale 3-5)) for up to 10 days.   Dose:  5 mg       senna-docusate 8.6-50 MG Tabs   Commonly known as:  PERICOLACE or SENOKOT S    Take 2 Tabs by mouth 2 Times a Day.   Dose:  2 Tab       timolol 0.5 % Soln   Commonly known as:  TIMOPTIC    Place 1 Drop in both eyes 2 times a day.   Dose:  1 Drop       topiramate 100 MG Tabs   Commonly known as:  TOPAMAX    Take 1 Tab by mouth 2 times a day.   Dose:  100 mg       trazodone 50 MG Tabs   Commonly known as:  DESYREL    Take 50 mg by mouth every evening.   Dose:  50 mg

## 2017-04-16 NOTE — IP AVS SNAPSHOT
" Home Care Instructions                                                                                                                  Name:Elizabeth Murphy  Medical Record Number:[id number]  CSN: [id number]    YOB: 1950   Age: 66 y.o.  Sex: female  HT:1.626 m (5' 4\") WT: 69.4 kg (153 lb)          Admit Date: 4/16/2017     Discharge Date:   Today's Date: 4/19/2017  Attending Doctor:  Mike Dallas M.D.                  Allergies:  Ampicillin and Latex            Discharge Instructions       Discharge Instructions    Discharged to home by car with relative. Discharged via wheelchair, hospital escort: Yes.  Special equipment needed: Walker    Be sure to schedule a follow-up appointment with your primary care doctor or any specialists as instructed.     Discharge Plan:   Influenza Vaccine Indication: Not indicated: Previously immunized this influenza season and > 8 years of age    I understand that a diet low in cholesterol, fat, and sodium is recommended for good health. Unless I have been given specific instructions below for another diet, I accept this instruction as my diet prescription.   Other diet: Regular diet as tolerated    Special Instructions: Discharge instructions for the Orthopedic Patient      Closed Reduction for Artificial Hip Dislocation, Care After  Refer to this sheet in the next few weeks. These instructions provide you with information on caring for yourself after your procedure. Your health care provider may also give you more specific instructions. Your treatment has been planned according to current medical practices, but problems sometimes occur. Call your health care provider if you have any problems or questions after your procedure.  WHAT TO EXPECT AFTER THE PROCEDURE  After your procedure, it is typical to have the following:   · Hip pain for about a week. The pain should improve each day.  · Difficulty walking or doing your usual daily activities.  It may take a few " weeks to recover completely.  HOME CARE INSTRUCTIONS   · Resume your regular activities as directed by your health care provider. Stop doing an activity if it causes pain or discomfort.  · Ask people to help you with activities that are difficult.  · Apply ice to the injured area:  ¨ Put ice in a plastic bag.  ¨ Place a towel between your skin and the bag.  ¨ Leave the ice on for 15-20 minutes, 3-4 times a day.  · Take medicines only as directed by your health care provider.  · Wear your brace as directed by your health care provider.  · Use crutches as instructed. When you stop using your crutches, move slowly at first.  · If you have a plaster or fiberglass cast:  ¨ Keep your cast dry and clean.  ¨ Do not put pressure on any part of your cast until it has completely hardened.  ¨ Do not use sharp or pointed objects to try to scratch the skin under the cast.  ¨ Check the skin around the cast every day. You may put lotion on any red or sore areas.  ¨ Protect your cast with a plastic bag while bathing. Do not lower the cast into water.  · Keep all follow-up visits as directed by your health care provider. This is important. You may need to see a physical therapist for rehabilitation.  · Do range-of-motion exercises at home as directed by your physical therapist.  SEEK MEDICAL CARE IF:   · It is not getting easier to walk or move.  · Your calf or leg swells or feels tender.  · The swelling around your hip gets worse.  · Any part of your hip or leg feels numb or tingles.  · Your pain medicine is not helping.  SEEK IMMEDIATE MEDICAL CARE IF:   · Your hip dislocates again.  · You have difficulty breathing.  · You have chest pain.    Oxycodone tablets or capsules  What is this medicine?  OXYCODONE (ox i KOE done) is a pain reliever. It is used to treat moderate to severe pain.  This medicine may be used for other purposes; ask your health care provider or pharmacist if you have questions.  COMMON BRAND NAME(S): Dazidox ,  Endocodone , OXECTA, OxyIR, Percolone, Roxicodone  What should I tell my health care provider before I take this medicine?  They need to know if you have any of these conditions:  -Hai's disease  -brain tumor  -drug abuse or addiction  -head injury  -heart disease  -if you frequently drink alcohol containing drinks  -kidney disease or problems going to the bathroom  -liver disease  -lung disease, asthma, or breathing problems  -mental problems  -an unusual or allergic reaction to oxycodone, codeine, hydrocodone, morphine, other medicines, foods, dyes, or preservatives  -pregnant or trying to get pregnant  -breast-feeding  How should I use this medicine?  Take this medicine by mouth with a glass of water. Follow the directions on the prescription label. You can take it with or without food. If it upsets your stomach, take it with food. Take your medicine at regular intervals. Do not take it more often than directed. Do not stop taking except on your doctor's advice.  Some brands of this medicine, like Oxecta, have special instructions. Ask your doctor or pharmacist if these directions are for you: Do not cut, crush or chew this medicine. Swallow only one tablet at a time. Do not wet, soak, or lick the tablet before you take it.  Talk to your pediatrician regarding the use of this medicine in children. Special care may be needed.  Overdosage: If you think you have taken too much of this medicine contact a poison control center or emergency room at once.  NOTE: This medicine is only for you. Do not share this medicine with others.  What if I miss a dose?  If you miss a dose, take it as soon as you can. If it is almost time for your next dose, take only that dose. Do not take double or extra doses.  What may interact with this medicine?  -alcohol  -antihistamines  -certain medicines used for nausea like chlorpromazine, droperidol  -erythromycin  -ketoconazole  -medicines for depression, anxiety, or psychotic  disturbances  -medicines for sleep  -muscle relaxants  -naloxone  -naltrexone  -narcotic medicines (opiates) for pain  -nilotinib  -phenobarbital  -phenytoin  -rifampin  -ritonavir  -voriconazole  This list may not describe all possible interactions. Give your health care provider a list of all the medicines, herbs, non-prescription drugs, or dietary supplements you use. Also tell them if you smoke, drink alcohol, or use illegal drugs. Some items may interact with your medicine.  What should I watch for while using this medicine?  Tell your doctor or health care professional if your pain does not go away, if it gets worse, or if you have new or a different type of pain. You may develop tolerance to the medicine. Tolerance means that you will need a higher dose of the medicine for pain relief. Tolerance is normal and is expected if you take this medicine for a long time.  Do not suddenly stop taking your medicine because you may develop a severe reaction. Your body becomes used to the medicine. This does NOT mean you are addicted. Addiction is a behavior related to getting and using a drug for a non-medical reason. If you have pain, you have a medical reason to take pain medicine. Your doctor will tell you how much medicine to take. If your doctor wants you to stop the medicine, the dose will be slowly lowered over time to avoid any side effects.  You may get drowsy or dizzy when you first start taking this medicine or change doses. Do not drive, use machinery, or do anything that may be dangerous until you know how the medicine affects you. Stand or sit up slowly.  There are different types of narcotic medicines (opiates) for pain. If you take more than one type at the same time, you may have more side effects. Give your health care provider a list of all medicines you use. Your doctor will tell you how much medicine to take. Do not take more medicine than directed. Call emergency for help if you have problems  breathing.  This medicine will cause constipation. Try to have a bowel movement at least every 2 to 3 days. If you do not have a bowel movement for 3 days, call your doctor or health care professional.  Your mouth may get dry. Drinking water, chewing sugarless gum, or sucking on hard candy may help. See your dentist every 6 months.  What side effects may I notice from receiving this medicine?  Side effects that you should report to your doctor or health care professional as soon as possible:  -allergic reactions like skin rash, itching or hives, swelling of the face, lips, or tongue  -breathing problems  -confusion  -feeling faint or lightheaded, falls  -trouble passing urine or change in the amount of urine  -unusually weak or tired  Side effects that usually do not require medical attention (report to your doctor or health care professional if they continue or are bothersome):  -constipation  -dry mouth  -itching  -nausea, vomiting  -upset stomach  This list may not describe all possible side effects. Call your doctor for medical advice about side effects. You may report side effects to FDA at 4-053-FDA-5529.  Where should I keep my medicine?  Keep out of the reach of children. This medicine can be abused. Keep your medicine in a safe place to protect it from theft. Do not share this medicine with anyone. Selling or giving away this medicine is dangerous and against the law.  Store at room temperature between 15 and 30 degrees C (59 and 86 degrees F). Protect from light. Keep container tightly closed.   Discard unused medicine and used packaging carefully. Pets and children can be harmed if they find used or lost packages. Flush any unused medicines down the toilet. Do not use the medicine after the expiration date.  NOTE: This sheet is a summary. It may not cover all possible information. If you have questions about this medicine, talk to your doctor, pharmacist, or health care provider.  © 2014, [employer]. (11/15/2012 8:33:37 AM)       This information is not intended to replace advice given to you by your health care provider. Make sure you discuss any questions you have with your health care provider.     Document Released: 07/07/2006 Document Revised: 01/08/2016 Document Reviewed: 05/07/2015  [employer] Interactive Patient Education ©2016 [employer]    Follow up with Primary Care Physician within 2 weeks of discharge to home, regarding:  Review of medications and diagnostic testing.  Surveillance for medical complications.  Workup and treatment of osteoporosis, if appropriate.     -Is this a Joint Replacement patient? No    -Is this patient being discharged with medication to prevent blood clots?  No    · Is patient discharged on Warfarin / Coumadin?   No     · Is patient Post Blood Transfusion?  No    Depression / Suicide Risk    As you are discharged from this [facility] facility, it is important to learn how to keep safe from harming yourself.    Recognize the warning signs:  · Abrupt changes in personality, positive or negative- including increase in energy   · Giving away possessions  · Change in eating patterns- significant weight changes-  positive or negative  · Change in sleeping patterns- unable to sleep or sleeping all the time   · Unwillingness or inability to communicate  · Depression  · Unusual sadness, discouragement and loneliness  · Talk of wanting to die  · Neglect of personal appearance   · Rebelliousness- reckless behavior  · Withdrawal from people/activities they love  · Confusion- inability to concentrate     If you or a loved one observes any of these behaviors or has concerns about self-harm, here's what you can do:  · Talk about it- your feelings and reasons for harming yourself  · Remove any means that you might use to hurt yourself (examples: pills, rope, extension cords, firearm)  · Get professional help from the community (Mental Health, Substance Abuse, psychological  counseling)  · Do not be alone:Call your Safe Contact- someone whom you trust who will be there for you.  · Call your local CRISIS HOTLINE 939-5230 or 063-945-0938  · Call your local Children's Mobile Crisis Response Team [place] (141) 934-0068 or www.Debt Resolve  · Call the toll free National Suicide Prevention Hotlines   · National Suicide Prevention Lifeline 065-818-OENO (5364)  · [employer] Line Network 800-SUICIDE (276-1390)        Open reduction and internal fixation (ORIF) is a common surgery used to fix a hip fracture or a broken hip. A surgical cut (incision) in the skin will be made to open the fracture area. This lets the surgeon see the broken bone. The bone pieces will then be put back together. Hardware, such as screws, pins, rods, or a metal plate, will be used to hold the bones in place.  LET YOUR HEALTH CARE PROVIDER KNOW ABOUT:  · Any allergies you have.  · All medicines you are taking, including vitamins, herbs, eye drops, creams, and over-the-counter medicines.  · Previous problems you or members of your family have had with the use of anesthetics.  · Any blood disorders you have.  · Previous surgeries you have had.  · Medical conditions you have.  · Your tobacco use.  RISKS AND COMPLICATIONS   Generally, this is a safe procedure. However, problems can occur and include:  · Blood clots in the legs or lungs.  · Bleeding.  · Infection.  · Lung infection (pneumonia).  · Continuing pain.  · Difficulty walking.  BEFORE THE PROCEDURE  · A medical evaluation will be done. This examination will include checking your heart and lungs.  · You may have imaging tests, including:  · X-rays to find exactly where the break is.  · CT scan to get a better view of the broken hip.  · MRI to check for any hidden fracture that cannot be seen on X-ray or CT scan.  · You may also have blood and urine tests.  · Ask your health care provider about changing or stopping your regular medicines. This is  especially important if you are taking diabetes medicines or blood thinners, such as aspirin and ibuprofen. Do not take these medicines before your procedure if your health care provider asks you not to.  · Do not eat or drink anything after midnight on the night before the procedure or as directed by your health care provider.  · Plan to have someone take you home after you are released from the hospital.  PROCEDURE  · You will be given one of the following:  · A medicine that numbs the area (local anesthetic).  · A medicine that makes you fall asleep (general anesthetic).  · Small monitors will be put on your body. They will be used to check your heart, blood pressure, and oxygen level.  · An IV tube will be inserted in your arm. Medicine will flow directly into your body through the IV tube.  · A catheter may be inserted into your bladder to collect urine while you are asleep.  · Your hip area will be scrubbed with a germ-killing solution (antiseptic).  · When you are asleep or numb, the surgeon will move your bones back into normal alignment and position.  · X-rays may be taken to check the position of the bones.  · A skin incision will be made over the hip. It will go through your muscles to the broken bone.  · The bones will be secured. Hardware will be used to hold the bone together.  · The incision will be closed with small stitches (sutures) or staples.  · A bandage (dressing) or some other kind of wound cover will be placed over the incision.  AFTER THE PROCEDURE  2. Your blood pressure, heart rate, breathing rate, and blood oxygen level will be monitored often until the medicines you were given have worn off.  3. You may continue to receive fluids or pain medicines through the IV tube.  4. It is important for you to be up and moving as soon as possible after this procedure.  5. Physical therapists will help you to start walking.  6. You may need to use a walker or crutches after the procedure.  7. Follow  your health care provider's instructions about bearing weight on your injured leg.  8. You may have to wear compression stockings. These stockings help to prevent blood clots and reduce swelling in your legs.     This information is not intended to replace advice given to you by your health care provider. Make sure you discuss any questions you have with your health care provider.     Document Released: 12/06/2010 Document Revised: 01/08/2016 Document Reviewed: 05/21/2015  [employer] Interactive Patient Education ©2016 [employer]    Refer to this sheet in the next few weeks. These instructions provide you with information about caring for yourself after your procedure. Your health care provider may also give you more specific instructions. Your treatment has been planned according to current medical practices, but problems sometimes occur. Call your health care provider if you have any problems or questions after your procedure.  WHAT TO EXPECT AFTER THE PROCEDURE  After your procedure, it is typical to have the following:  · Pain.  · Swelling.  · Difficulty walking.  HOME CARE INSTRUCTIONS  · Have someone help you with everyday activities, such as showering and meals, for the first week after you leave the hospital or as directed.  · Take medicines only as directed by your health care provider. Do not take any over-the-counter medicines without approval from your health care provider. Medicines such as aspirin and ibuprofen can increase your risk of bleeding.  · Constipation is common after surgery from the pain medicines used and the decrease in your activity level. It is important to drink fluids and to increase your intake of fruits and vegetables. Stool softeners may be prescribed by your health care provider.  · There are many different ways to close and cover an incision, including stitches, skin glue, and adhesive strips. Follow your health care provider's instructions about:  ¨ Incision care.  ¨ Bandage  (dressing) changes and removal.  ¨ Incision closure removal.  · Wear compression stockings as directed by your health care provider. These stockings help to prevent blood clots and reduce swelling in your legs.  · Do not take baths, swim, or use a hot tub until your health care provider approves.  · Use crutches or a walker as directed by your health care provider.  · Be sure to do any exercises that your physical therapist suggests. These exercises will help to make your hip stronger and help you to recover more quickly.  · Ask your health care provider when you can resume other activities, such as work, driving, and sex.  · Do not drive or operate heavy machinery while taking pain medicine.  · Keep all follow-up visits as directed by your health care provider. This is important.  SEEK MEDICAL CARE IF:  · You have fatigue.  · You feel weak.  · Your pain is not relieved by medicines.  · You have a fever.  · You have drainage, redness, swelling, or pain at your incision.  SEEK IMMEDIATE MEDICAL CARE IF:  · Your incision is bleeding.  · Your leg or foot is painful and swollen.  · Your leg is pale or blue.  · Your leg is cold.  · Your leg tingles or is numb.  · You have trouble breathing.  · You have chest pain.     This information is not intended to replace advice given to you by your health care provider. Make sure you discuss any questions you have with your health care provider.     Document Released: 07/15/2015 Document Reviewed: 07/15/2015  [employer] Interactive Patient Education ©2016 [employer]        Your appointments     Jun 21, 2017  2:20 PM   Established Patient with TERRI Serna   [facility])    [address] [place] [id number] [phone]           You will be receiving a confirmation call a few days before your appointment from our automated call confirmation system.              Follow-up Information     1. Follow up with Dhaval Poole  M.D. On 4/2/2017.    Specialty:  Orthopaedics    Contact information    [address]  [address] [address]  [phone]          2. Please follow up.    Why:  8:30, For wound re-check         Discharge Medication Instructions:    Below are the medications your physician expects you to take upon discharge:    Review all your home medications and newly ordered medications with your doctor and/or pharmacist. Follow medication instructions as directed by your doctor and/or pharmacist.    Please keep your medication list with you and share with your physician.               Medication List      START taking these medications        Instructions    Morning Afternoon Evening Bedtime    oxycodone immediate-release 5 MG Tabs   Last time this was given:  10 mg on 4/18/2017  6:16 PM   Commonly known as:  ROXICODONE        Take 1 Tab by mouth every 3 hours as needed (Moderate Pain (NRS Pain Scale 4-6; CPOT Pain Scale 3-5)) for up to 10 days.   Dose:  5 mg                        senna-docusate 8.6-50 MG Tabs   Last time this was given:  2 Tabs on 4/18/2017 10:52 PM   Commonly known as:  PERICOLACE or SENOKOT S        Take 2 Tabs by mouth 2 Times a Day.   Dose:  2 Tab                          CONTINUE taking these medications        Instructions    Morning Afternoon Evening Bedtime    atorvastatin 40 MG Tabs   Last time this was given:  40 mg on 4/18/2017 10:53 PM   Commonly known as:  LIPITOR        Take 1 Tab by mouth every day.   Dose:  40 mg                        CALCIUM-VITAMIN D PO        Take  by mouth.                        celecoxib 100 MG Caps   Commonly known as:  CELEBREX        Take 1 Cap by mouth every day.   Dose:  100 mg                        citalopram 20 MG Tabs   Last time this was given:  20 mg on 4/19/2017  8:56 AM   Commonly known as:  CELEXA        Take 20 mg by mouth every day.   Dose:  20 mg                        clonazepam 1 MG Tabs   Last time this was given:  0.5 mg on 4/19/2017  8:56 AM      Commonly known as:  KLONOPIN        Take 0.5-1 mg by mouth 3 times a day. 0.5mg in am, 0.5mg 1400, 1mg at bedtime.   Dose:  0.5-1 mg                        levothyroxine 88 MCG Tabs   Commonly known as:  SYNTHROID        TAKE ONE TABLET BY MOUTH ONCE DAILY                        lisinopril-hydrochlorothiazide 20-12.5 MG per tablet   Commonly known as:  PRINZIDE, ZESTORETIC        TAKE 1 TABLET DAILY                        LYRICA 50 MG capsule   Last time this was given:  50 mg on 4/19/2017  8:56 AM   Generic drug:  pregabalin        Take 50 mg by mouth 2 times a day.   Dose:  50 mg                        methylphenidate 5 MG Tabs   Last time this was given:  5 mg on 4/19/2017  8:54 AM   Commonly known as:  RITALIN        Take 5 mg by mouth 2 times a day. 5mg in am, 5mg at noon.   Dose:  5 mg                        omeprazole 20 MG delayed-release capsule   Last time this was given:  20 mg on 4/19/2017  8:54 AM   Commonly known as:  PRILOSEC        Take 20 mg by mouth every day.   Dose:  20 mg                        timolol 0.5 % Soln   Commonly known as:  TIMOPTIC        Place 1 Drop in both eyes 2 times a day.   Dose:  1 Drop                        topiramate 100 MG Tabs   Last time this was given:  100 mg on 4/19/2017  8:54 AM   Commonly known as:  TOPAMAX        Take 1 Tab by mouth 2 times a day.   Dose:  100 mg                        trazodone 50 MG Tabs   Last time this was given:  50 mg on 4/18/2017 10:51 PM   Commonly known as:  DESYREL        Take 50 mg by mouth every evening.   Dose:  50 mg                             Where to Get Your Medications      Information about where to get these medications is not yet available     ! Ask your nurse or doctor about these medications    - oxycodone immediate-release 5 MG Tabs  - senna-docusate 8.6-50 MG Tabs            Instructions           Diet / Nutrition:    Follow any diet instructions given to you by your doctor or the dietician, including how much salt  (sodium) you are allowed each day.    If you are overweight, talk to your doctor about a weight reduction plan.    Activity:    Remain physically active following your doctor's instructions about exercise and activity.    Rest often.     Any time you become even a little tired or short of breath, SIT DOWN and rest.    Worsening Symptoms:    Report any of the following signs and symptoms to the doctor's office immediately:    *Pain of jaw, arm, or neck  *Chest pain not relieved by medication                               *Dizziness or loss of consciousness  *Difficulty breathing even when at rest   *More tired than usual                                       *Bleeding drainage or swelling of surgical site  *Swelling of feet, ankles, legs or stomach                 *Fever (>100ºF)  *Pink or blood tinged sputum  *Weight gain (3lbs/day or 5lbs /week)           *Shock from internal defibrillator (if applicable)  *Palpitations or irregular heartbeats                *Cool and/or numb extremities    Stroke Awareness    Common Risk Factors for Stroke include:    Age  Atrial Fibrillation  Carotid Artery Stenosis  Diabetes Mellitus  Excessive alcohol consumption  High blood pressure  Overweight   Physical inactivity  Smoking    Warning signs and symptoms of a stroke include:    *Sudden numbness or weakness of the face, arm or leg (especially on one side of the body).  *Sudden confusion, trouble speaking or understanding.  *Sudden trouble seeing in one or both eyes.  *Sudden trouble walking, dizziness, loss of balance or coordination.Sudden severe headache with no known cause.    It is very important to get treatment quickly when a stroke occurs. If you experience any of the above warning signs, call 911 immediately.                   Disclaimer         Quit Smoking / Tobacco Use:    I understand the use of any tobacco products increases my chance of suffering from future heart disease or stroke and could cause other illnesses  which may shorten my life. Quitting the use of tobacco products is the single most important thing I can do to improve my health. For further information on smoking / tobacco cessation call a Toll Free Quit Line at 1-181.390.9394 (*[employer]) or 1-807.591.6052 (American [employer] Association) or you can access the web based program at www.lungusa.org.    [place] Tobacco Users Help Line:  (163) 474-8266       Toll Free: 1-923.847.7227  Quit Tobacco Program [facility] (939)573-2263    Crisis Hotline:    [place] Crisis Hotline:  8-637-AWWQZQJ or 1-498.246.6615    [place] Crisis Hotline:    1-249.612.6014 or 640-379-8615    Discharge Survey:   Thank you for choosing [facility]. We hope we did everything we could to make your hospital stay a pleasant one. You may be receiving a phone survey and we would appreciate your time and participation in answering the questions. Your input is very valuable to us in our efforts to improve our service to our patients and their families.        My signature on this form indicates that:    1. I have reviewed and understand the above information.  2. My questions regarding this information have been answered to my satisfaction.  3. I have formulated a plan with my discharge nurse to obtain my prescribed medications for home.                  Disclaimer         __________________________________                     __________       ________                       Patient Signature                                                 Date                    Time

## 2017-04-16 NOTE — ED NOTES
Pt bib [facility] from home. Pt had MGLF c/o R hip, R ankle, R wrist pain. No obvious deformity noted. Pt received 50 mcg fentanyl iv pta. fsbs 71. vss chart up for erp.

## 2017-04-16 NOTE — IP AVS SNAPSHOT
[employer] Access Code: Activation code not generated  Current [employer] Status: Active    [employer]  A secure, online tool to manage your health information     [employer]’s [employer]® is a secure, online tool that connects you to your personalized health information from the privacy of your home -- day or night - making it very easy for you to manage your healthcare. Once the activation process is completed, you can even access your medical information using the [employer] shirlene, which is available for free in the Apple Shirlene store or Google Play store.     [employer] provides the following levels of access (as shown below):   My Chart Features   [facility] Primary Care Doctor [facility]  Specialists [facility]  Urgent  Care Non-[facility]  Primary Care  Doctor   Email your healthcare team securely and privately 24/7 X X X X   Manage appointments: schedule your next appointment; view details of past/upcoming appointments X      Request prescription refills. X      View recent personal medical records, including lab and immunizations X X X X   View health record, including health history, allergies, medications X X X X   Read reports about your outpatient visits, procedures, consult and ER notes X X X X   See your discharge summary, which is a recap of your hospital and/or ER visit that includes your diagnosis, lab results, and care plan. X X       How to register for [employer]:  1. Go to  [email or web address].  2. Click on the Sign Up Now box, which takes you to the New Member Sign Up page. You will need to provide the following information:  a. Enter your [employer] Access Code exactly as it appears at the top of this page. (You will not need to use this code after you’ve completed the sign-up process. If you do not sign up before the expiration date, you must request a new code.)   b. Enter your date of birth.   c. Enter your home email address.   d. Click Submit, and follow the next screen’s instructions.  3. Create a [employer] ID. This will  be your [employer] login ID and cannot be changed, so think of one that is secure and easy to remember.  4. Create a [employer] password. You can change your password at any time.  5. Enter your Password Reset Question and Answer. This can be used at a later time if you forget your password.   6. Enter your e-mail address. This allows you to receive e-mail notifications when new information is available in [employer].  7. Click Sign Up. You can now view your health information.    For assistance activating your [employer] account, call [phone]

## 2017-04-17 ENCOUNTER — APPOINTMENT (OUTPATIENT)
Dept: RADIOLOGY | Facility: MEDICAL CENTER | Age: 67
DRG: 481 | End: 2017-04-17
Attending: ORTHOPAEDIC SURGERY
Payer: COMMERCIAL

## 2017-04-17 PROBLEM — D64.9 NORMOCYTIC ANEMIA: Status: ACTIVE | Noted: 2017-04-17

## 2017-04-17 PROBLEM — S72.001A FRACTURE OF FEMORAL NECK, RIGHT (HCC): Status: ACTIVE | Noted: 2017-04-16

## 2017-04-17 PROBLEM — F41.9 ANXIETY: Status: ACTIVE | Noted: 2017-04-17

## 2017-04-17 LAB
ANION GAP SERPL CALC-SCNC: 7 MMOL/L (ref 0–11.9)
BUN SERPL-MCNC: 15 MG/DL (ref 8–22)
CALCIUM SERPL-MCNC: 8.7 MG/DL (ref 8.5–10.5)
CHLORIDE SERPL-SCNC: 107 MMOL/L (ref 96–112)
CO2 SERPL-SCNC: 24 MMOL/L (ref 20–33)
CREAT SERPL-MCNC: 0.87 MG/DL (ref 0.5–1.4)
ERYTHROCYTE [DISTWIDTH] IN BLOOD BY AUTOMATED COUNT: 42.9 FL (ref 35.9–50)
EST. AVERAGE GLUCOSE BLD GHB EST-MCNC: 108 MG/DL
GFR SERPL CREATININE-BSD FRML MDRD: >60 ML/MIN/1.73 M 2
GLUCOSE SERPL-MCNC: 121 MG/DL (ref 65–99)
HBA1C MFR BLD: 5.4 % (ref 0–5.6)
HCT VFR BLD AUTO: 35.3 % (ref 37–47)
HGB BLD-MCNC: 11.5 G/DL (ref 12–16)
MCH RBC QN AUTO: 31.6 PG (ref 27–33)
MCHC RBC AUTO-ENTMCNC: 32.6 G/DL (ref 33.6–35)
MCV RBC AUTO: 97 FL (ref 81.4–97.8)
PLATELET # BLD AUTO: 176 K/UL (ref 164–446)
PMV BLD AUTO: 9.6 FL (ref 9–12.9)
POTASSIUM SERPL-SCNC: 3.6 MMOL/L (ref 3.6–5.5)
RBC # BLD AUTO: 3.64 M/UL (ref 4.2–5.4)
SODIUM SERPL-SCNC: 138 MMOL/L (ref 135–145)
WBC # BLD AUTO: 7.5 K/UL (ref 4.8–10.8)

## 2017-04-17 PROCEDURE — 503036 HCHG GUIDE PIN,OIC: Performed by: ORTHOPAEDIC SURGERY

## 2017-04-17 PROCEDURE — A9270 NON-COVERED ITEM OR SERVICE: HCPCS | Performed by: ORTHOPAEDIC SURGERY

## 2017-04-17 PROCEDURE — 700101 HCHG RX REV CODE 250: Mod: JW

## 2017-04-17 PROCEDURE — 73502 X-RAY EXAM HIP UNI 2-3 VIEWS: CPT | Mod: RT

## 2017-04-17 PROCEDURE — 83036 HEMOGLOBIN GLYCOSYLATED A1C: CPT

## 2017-04-17 PROCEDURE — 770001 HCHG ROOM/CARE - MED/SURG/GYN PRIV*

## 2017-04-17 PROCEDURE — 160009 HCHG ANES TIME/MIN: Performed by: ORTHOPAEDIC SURGERY

## 2017-04-17 PROCEDURE — 160029 HCHG SURGERY MINUTES - 1ST 30 MINS LEVEL 4: Performed by: ORTHOPAEDIC SURGERY

## 2017-04-17 PROCEDURE — 501445 HCHG STAPLER, SKIN DISP: Performed by: ORTHOPAEDIC SURGERY

## 2017-04-17 PROCEDURE — 80048 BASIC METABOLIC PNL TOTAL CA: CPT

## 2017-04-17 PROCEDURE — 700111 HCHG RX REV CODE 636 W/ 250 OVERRIDE (IP): Performed by: ORTHOPAEDIC SURGERY

## 2017-04-17 PROCEDURE — 700101 HCHG RX REV CODE 250: Performed by: HOSPITALIST

## 2017-04-17 PROCEDURE — 700111 HCHG RX REV CODE 636 W/ 250 OVERRIDE (IP)

## 2017-04-17 PROCEDURE — 36415 COLL VENOUS BLD VENIPUNCTURE: CPT

## 2017-04-17 PROCEDURE — 160002 HCHG RECOVERY MINUTES (STAT): Performed by: ORTHOPAEDIC SURGERY

## 2017-04-17 PROCEDURE — A9270 NON-COVERED ITEM OR SERVICE: HCPCS

## 2017-04-17 PROCEDURE — 700102 HCHG RX REV CODE 250 W/ 637 OVERRIDE(OP): Performed by: ORTHOPAEDIC SURGERY

## 2017-04-17 PROCEDURE — 160048 HCHG OR STATISTICAL LEVEL 1-5: Performed by: ORTHOPAEDIC SURGERY

## 2017-04-17 PROCEDURE — 160036 HCHG PACU - EA ADDL 30 MINS PHASE I: Performed by: ORTHOPAEDIC SURGERY

## 2017-04-17 PROCEDURE — 700102 HCHG RX REV CODE 250 W/ 637 OVERRIDE(OP): Performed by: HOSPITALIST

## 2017-04-17 PROCEDURE — 160035 HCHG PACU - 1ST 60 MINS PHASE I: Performed by: ORTHOPAEDIC SURGERY

## 2017-04-17 PROCEDURE — A9270 NON-COVERED ITEM OR SERVICE: HCPCS | Performed by: HOSPITALIST

## 2017-04-17 PROCEDURE — 2W6NXZZ TRACTION OF RIGHT UPPER LEG: ICD-10-PCS | Performed by: ORTHOPAEDIC SURGERY

## 2017-04-17 PROCEDURE — 85027 COMPLETE CBC AUTOMATED: CPT

## 2017-04-17 PROCEDURE — 700105 HCHG RX REV CODE 258: Performed by: HOSPITALIST

## 2017-04-17 PROCEDURE — 502240 HCHG MISC OR SUPPLY RC 0272: Performed by: ORTHOPAEDIC SURGERY

## 2017-04-17 PROCEDURE — C1713 ANCHOR/SCREW BN/BN,TIS/BN: HCPCS | Performed by: ORTHOPAEDIC SURGERY

## 2017-04-17 PROCEDURE — 700111 HCHG RX REV CODE 636 W/ 250 OVERRIDE (IP): Performed by: HOSPITALIST

## 2017-04-17 PROCEDURE — 99232 SBSQ HOSP IP/OBS MODERATE 35: CPT | Performed by: INTERNAL MEDICINE

## 2017-04-17 PROCEDURE — 110382 HCHG SHELL REV 271: Performed by: ORTHOPAEDIC SURGERY

## 2017-04-17 PROCEDURE — 500887 HCHG PACK, MAJOR BASIN: Performed by: ORTHOPAEDIC SURGERY

## 2017-04-17 PROCEDURE — 160041 HCHG SURGERY MINUTES - EA ADDL 1 MIN LEVEL 4: Performed by: ORTHOPAEDIC SURGERY

## 2017-04-17 PROCEDURE — A6222 GAUZE <=16 IN NO W/SAL W/O B: HCPCS | Performed by: ORTHOPAEDIC SURGERY

## 2017-04-17 PROCEDURE — 700112 HCHG RX REV CODE 229: Performed by: ORTHOPAEDIC SURGERY

## 2017-04-17 PROCEDURE — A4606 OXYGEN PROBE USED W OXIMETER: HCPCS | Performed by: ORTHOPAEDIC SURGERY

## 2017-04-17 PROCEDURE — 0QS634Z REPOSITION RIGHT UPPER FEMUR WITH INTERNAL FIXATION DEVICE, PERCUTANEOUS APPROACH: ICD-10-PCS | Performed by: ORTHOPAEDIC SURGERY

## 2017-04-17 PROCEDURE — 700102 HCHG RX REV CODE 250 W/ 637 OVERRIDE(OP)

## 2017-04-17 PROCEDURE — A6402 STERILE GAUZE <= 16 SQ IN: HCPCS | Performed by: ORTHOPAEDIC SURGERY

## 2017-04-17 DEVICE — SCREW CANNNULATED 16MM THREAD 7.3MM X 80MM (3TX3=9): Type: IMPLANTABLE DEVICE | Status: FUNCTIONAL

## 2017-04-17 DEVICE — SCREW CANNNULATED 16MM THREAD 7.3MM X 85MM (3TX3=9): Type: IMPLANTABLE DEVICE | Status: FUNCTIONAL

## 2017-04-17 DEVICE — SCREW CANNNULATED 16MM THREAD 7.3MM X 90MM (3TX3=9): Type: IMPLANTABLE DEVICE | Status: FUNCTIONAL

## 2017-04-17 RX ORDER — DOCUSATE SODIUM 100 MG/1
100 CAPSULE, LIQUID FILLED ORAL 2 TIMES DAILY
Status: DISCONTINUED | OUTPATIENT
Start: 2017-04-17 | End: 2017-04-19 | Stop reason: HOSPADM

## 2017-04-17 RX ORDER — BISACODYL 10 MG
10 SUPPOSITORY, RECTAL RECTAL
Status: DISCONTINUED | OUTPATIENT
Start: 2017-04-17 | End: 2017-04-19 | Stop reason: HOSPADM

## 2017-04-17 RX ORDER — ENEMA 19; 7 G/133ML; G/133ML
1 ENEMA RECTAL
Status: DISCONTINUED | OUTPATIENT
Start: 2017-04-17 | End: 2017-04-19 | Stop reason: HOSPADM

## 2017-04-17 RX ORDER — POLYETHYLENE GLYCOL 3350 17 G/17G
1 POWDER, FOR SOLUTION ORAL 2 TIMES DAILY PRN
Status: DISCONTINUED | OUTPATIENT
Start: 2017-04-17 | End: 2017-04-19 | Stop reason: HOSPADM

## 2017-04-17 RX ORDER — DEXAMETHASONE SODIUM PHOSPHATE 4 MG/ML
4 INJECTION, SOLUTION INTRA-ARTICULAR; INTRALESIONAL; INTRAMUSCULAR; INTRAVENOUS; SOFT TISSUE
Status: DISCONTINUED | OUTPATIENT
Start: 2017-04-17 | End: 2017-04-19 | Stop reason: HOSPADM

## 2017-04-17 RX ORDER — OXYCODONE HYDROCHLORIDE 5 MG/1
5 TABLET ORAL
Status: DISCONTINUED | OUTPATIENT
Start: 2017-04-17 | End: 2017-04-19 | Stop reason: HOSPADM

## 2017-04-17 RX ORDER — KETOROLAC TROMETHAMINE 30 MG/ML
30 INJECTION, SOLUTION INTRAMUSCULAR; INTRAVENOUS EVERY 6 HOURS
Status: DISCONTINUED | OUTPATIENT
Start: 2017-04-17 | End: 2017-04-19 | Stop reason: HOSPADM

## 2017-04-17 RX ORDER — AMOXICILLIN 250 MG
1 CAPSULE ORAL NIGHTLY
Status: DISCONTINUED | OUTPATIENT
Start: 2017-04-17 | End: 2017-04-19 | Stop reason: HOSPADM

## 2017-04-17 RX ORDER — HALOPERIDOL 5 MG/ML
1 INJECTION INTRAMUSCULAR EVERY 6 HOURS PRN
Status: DISCONTINUED | OUTPATIENT
Start: 2017-04-17 | End: 2017-04-19 | Stop reason: HOSPADM

## 2017-04-17 RX ORDER — CEFAZOLIN SODIUM 2 G/100ML
2 INJECTION, SOLUTION INTRAVENOUS EVERY 8 HOURS
Status: COMPLETED | OUTPATIENT
Start: 2017-04-17 | End: 2017-04-18

## 2017-04-17 RX ORDER — DIPHENHYDRAMINE HYDROCHLORIDE 50 MG/ML
25 INJECTION INTRAMUSCULAR; INTRAVENOUS EVERY 6 HOURS PRN
Status: DISCONTINUED | OUTPATIENT
Start: 2017-04-17 | End: 2017-04-19 | Stop reason: HOSPADM

## 2017-04-17 RX ORDER — SCOLOPAMINE TRANSDERMAL SYSTEM 1 MG/1
1 PATCH, EXTENDED RELEASE TRANSDERMAL
Status: DISCONTINUED | OUTPATIENT
Start: 2017-04-17 | End: 2017-04-19 | Stop reason: HOSPADM

## 2017-04-17 RX ORDER — OMEPRAZOLE 20 MG/1
20 CAPSULE, DELAYED RELEASE ORAL ONCE
Status: COMPLETED | OUTPATIENT
Start: 2017-04-17 | End: 2017-04-17

## 2017-04-17 RX ORDER — SODIUM CHLORIDE, SODIUM LACTATE, POTASSIUM CHLORIDE, CALCIUM CHLORIDE 600; 310; 30; 20 MG/100ML; MG/100ML; MG/100ML; MG/100ML
1000 INJECTION, SOLUTION INTRAVENOUS
Status: DISCONTINUED | OUTPATIENT
Start: 2017-04-17 | End: 2017-04-19 | Stop reason: HOSPADM

## 2017-04-17 RX ORDER — OXYCODONE HYDROCHLORIDE AND ACETAMINOPHEN 5; 325 MG/1; MG/1
TABLET ORAL
Status: COMPLETED
Start: 2017-04-17 | End: 2017-04-17

## 2017-04-17 RX ORDER — OXYCODONE HYDROCHLORIDE 10 MG/1
10 TABLET ORAL
Status: DISCONTINUED | OUTPATIENT
Start: 2017-04-17 | End: 2017-04-19 | Stop reason: HOSPADM

## 2017-04-17 RX ORDER — ACETAMINOPHEN 500 MG
1000 TABLET ORAL EVERY 6 HOURS
Status: DISCONTINUED | OUTPATIENT
Start: 2017-04-17 | End: 2017-04-19 | Stop reason: HOSPADM

## 2017-04-17 RX ORDER — ONDANSETRON 2 MG/ML
4 INJECTION INTRAMUSCULAR; INTRAVENOUS EVERY 4 HOURS PRN
Status: DISCONTINUED | OUTPATIENT
Start: 2017-04-17 | End: 2017-04-19 | Stop reason: HOSPADM

## 2017-04-17 RX ORDER — AMOXICILLIN 250 MG
1 CAPSULE ORAL
Status: DISCONTINUED | OUTPATIENT
Start: 2017-04-17 | End: 2017-04-19 | Stop reason: HOSPADM

## 2017-04-17 RX ADMIN — ATORVASTATIN CALCIUM 40 MG: 20 TABLET, FILM COATED ORAL at 22:42

## 2017-04-17 RX ADMIN — FENTANYL CITRATE 25 MCG: 50 INJECTION, SOLUTION INTRAMUSCULAR; INTRAVENOUS at 12:30

## 2017-04-17 RX ADMIN — ENOXAPARIN SODIUM 40 MG: 100 INJECTION SUBCUTANEOUS at 22:44

## 2017-04-17 RX ADMIN — TRAZODONE HYDROCHLORIDE 50 MG: 50 TABLET ORAL at 22:43

## 2017-04-17 RX ADMIN — ACETAMINOPHEN 1000 MG: 500 TABLET, FILM COATED ORAL at 17:47

## 2017-04-17 RX ADMIN — KETOROLAC TROMETHAMINE 30 MG: 30 INJECTION, SOLUTION INTRAMUSCULAR at 17:47

## 2017-04-17 RX ADMIN — OXYCODONE AND ACETAMINOPHEN 2 TABLET: 5; 325 TABLET ORAL at 12:00

## 2017-04-17 RX ADMIN — KETOROLAC TROMETHAMINE 30 MG: 30 INJECTION, SOLUTION INTRAMUSCULAR at 22:44

## 2017-04-17 RX ADMIN — MORPHINE SULFATE 4 MG: 4 INJECTION INTRAVENOUS at 08:53

## 2017-04-17 RX ADMIN — FENTANYL CITRATE 50 MCG: 50 INJECTION, SOLUTION INTRAMUSCULAR; INTRAVENOUS at 12:00

## 2017-04-17 RX ADMIN — TOPIRAMATE 100 MG: 100 TABLET ORAL at 22:43

## 2017-04-17 RX ADMIN — DOCUSATE SODIUM 100 MG: 100 CAPSULE ORAL at 22:43

## 2017-04-17 RX ADMIN — KETOROLAC TROMETHAMINE 30 MG: 30 INJECTION, SOLUTION INTRAMUSCULAR at 14:07

## 2017-04-17 RX ADMIN — PREGABALIN 50 MG: 25 CAPSULE ORAL at 22:42

## 2017-04-17 RX ADMIN — SODIUM CHLORIDE 1000 ML: 9 INJECTION, SOLUTION INTRAVENOUS at 08:53

## 2017-04-17 RX ADMIN — CEFAZOLIN SODIUM 2 G: 2 INJECTION, SOLUTION INTRAVENOUS at 17:47

## 2017-04-17 RX ADMIN — CLONAZEPAM 0.5 MG: 1 TABLET ORAL at 14:00

## 2017-04-17 RX ADMIN — MORPHINE SULFATE 4 MG: 4 INJECTION INTRAVENOUS at 05:49

## 2017-04-17 RX ADMIN — OXYCODONE HYDROCHLORIDE 10 MG: 5 TABLET ORAL at 02:26

## 2017-04-17 RX ADMIN — CLONAZEPAM 1 MG: 1 TABLET ORAL at 22:44

## 2017-04-17 RX ADMIN — STANDARDIZED SENNA CONCENTRATE AND DOCUSATE SODIUM 2 TABLET: 8.6; 5 TABLET, FILM COATED ORAL at 22:43

## 2017-04-17 RX ADMIN — HEPARIN SODIUM 5000 UNITS: 5000 INJECTION, SOLUTION INTRAVENOUS; SUBCUTANEOUS at 05:49

## 2017-04-17 RX ADMIN — ACETAMINOPHEN 1000 MG: 500 TABLET, FILM COATED ORAL at 22:42

## 2017-04-17 RX ADMIN — ACETAMINOPHEN 1000 MG: 500 TABLET, FILM COATED ORAL at 14:07

## 2017-04-17 RX ADMIN — OMEPRAZOLE 20 MG: 20 CAPSULE, DELAYED RELEASE ORAL at 22:42

## 2017-04-17 ASSESSMENT — PAIN SCALES - GENERAL
PAINLEVEL_OUTOF10: 7
PAINLEVEL_OUTOF10: 6
PAINLEVEL_OUTOF10: 7
PAINLEVEL_OUTOF10: 2
PAINLEVEL_OUTOF10: 2
PAINLEVEL_OUTOF10: 8
PAINLEVEL_OUTOF10: 2
PAINLEVEL_OUTOF10: 5
PAINLEVEL_OUTOF10: 7
PAINLEVEL_OUTOF10: 9

## 2017-04-17 ASSESSMENT — ENCOUNTER SYMPTOMS
HEADACHES: 0
DEPRESSION: 0
FOCAL WEAKNESS: 0
WEAKNESS: 0
PALPITATIONS: 0
HEARTBURN: 0
FEVER: 0
BLOOD IN STOOL: 0
BACK PAIN: 0
DIZZINESS: 0
VOMITING: 0
DIARRHEA: 0
CHILLS: 0
HALLUCINATIONS: 0
MYALGIAS: 0
ABDOMINAL PAIN: 0
COUGH: 0
SHORTNESS OF BREATH: 0
NAUSEA: 0
SORE THROAT: 0

## 2017-04-17 NOTE — CONSULTS
DATE OF SERVICE:  04/16/2017    CHIEF COMPLAINT:  Fall.    HISTORY OF PRESENT ILLNESS:  The patient is a 66-year-old female who came to   the emergency room after falling.  She was wearing some abnormal shoes and   kind of slipped in her kitchen and fell around on the right side of her hip.    She had right hip pain and could not walk, a little bit of right wrist pain.    She denies any other significant abnormalities.  She did not lose   consciousness and otherwise relatively healthy.    PAST MEDICAL HISTORY:  She does have some history of fibromyalgia and some   issues with depression.  She has hyperlipidemia.    MEDICATIONS:  Per the med reconciliation.    ALLERGIES:  TO AMPICILLIN AND LATEX.    REVIEW OF SYSTEMS:  As per the HPI, otherwise negative.    PHYSICAL EXAMINATION:  GENERAL:  The patient appears stated age.  PSYCHIATRIC:  Mood is appropriate.  She is alert and oriented.  HEENT:  Normal.  RESPIRATIONS:  Unlabored.  MUSCULOSKELETAL:  Examination of the patient's right side reveals actually   moves her hip pretty well.  She is tender over the trochanter.  Her knee   really is not tender.  Rest of her femur and leg is nontender.  She is   neurovascularly intact.  Her right wrist has some bruising over the fifth   metacarpal but really minimal wrist pain or deformity.  The distal radial   ulnar joint is nontender.  The thumb CMC really is not tender.    DIAGNOSTIC STUDIES:  X-rays of her right hip demonstrate a valgus impacted   femoral neck fracture.  No other significant abnormalities.  X-rays of her   femur and knee demonstrate no other fractures.  X-rays of her wrist   demonstrate possibly some an avulsion of the wrist ligaments, but no gross   fractures of the radius and ulna or scaphoid.    IMPRESSION:  1.  Right hip intertrochanteric fracture.  2.  Right wrist sprain with possible chronic versus acute ligamentous injury.    PLAN:  At this point, we are going to place her leg in a position of comfort,    admitted to the hospital.  We will plan doing a right hip pinning in the   morning by Dr. Poole.  We will also place her in a Velcro wrist splint and   keep an eye on her wrist.       ____________________________________     MD ROXANE BARNES / JOHNY    DD:  04/16/2017 19:12:09  DT:  04/16/2017 20:58:40    D#:  [id number]  Job#:  [id number]

## 2017-04-17 NOTE — H&P
CHIEF COMPLAINT:  Fall and hip pain.    PRIMARY MEDICAL PHYSICIAN:  Mica Colon M.D.    HISTORY OF PRESENT ILLNESS:  This is a pleasant 66-year-old female with a  history of hypertension, hyperlipidemia, hypothyroidism, fibromyalgia,   depression, and anxiety who comes in with complaints of right hip pain after a   fall.  The patient was walking on high heels when her heel caught on her   floor causing her to twist her ankle and fall to her right side.  She landed on a wood   floor in her house and had immediate pain.  She could not get up, not even   with assistance.  She states that she was unable to move until EMS arrived.    She denies any preceding headaches, dizziness, chest pain, or shortness of   breath.  She denies any head trauma or loss of consciousness.  She has had no   incontinence.  Prior to this, she was in her usual state of health with no   recent URI, no fevers, chills, nausea, vomiting, no diarrhea or dysuria.    REVIEW OF SYSTEMS:  A full review of systems was completed and all pertinent   positives and negatives are included in the HPI above.    PAST MEDICAL HISTORY:  1.  Hypertension.  2.  Hyperlipidemia.  3.  Hypothyroidism.  4.  Skin cancer.  5.  Fibromyalgia.  6.  Degenerative joint disease.  7.  Depression, anxiety, and PTSD.  8.  Common bile duct stricture.    PAST SURGICAL HISTORY:  1.  Cholecystectomy.  2.  EGD.    SOCIAL HISTORY:  No tobacco or illicit drugs.  Moderate alcohol intake.    FAMILY HISTORY:  Maternal grandmother and grandfather both with MIs.  Paternal   grandfather with CVA.    ALLERGIES:  AMPICILLIN AND LATEX.    HOME MEDICATIONS:  1.  Celexa 20 mg p.o. daily.  2.  ____ mg p.o. t.i.d.  3.  Ritalin 5 mg p.o. b.i.d.  4.  Prinzide 20/12.5 p.o. daily.  5.  Timolol eyedrops.  6.  Lyrica 50 mg p.o. b.i.d.  7.  Synthroid 88 mcg p.o. daily.  8.  Lipitor 40 mg p.o. daily.  9.  Prilosec 20 mg p.o. daily.  10.  Celebrex 100 mg p.o. daily.  11.  Topamax 100 mg p.o. b.i.d.  12.   Calcium and vitamin D.  13.  Trazodone 50 mg p.o. at bedtime.    PHYSICAL EXAMINATION:  VITAL SIGNS:  Temperature 98.1, heart rate 65, respirations 16, blood pressure   112/51, and patient is saturating at 96% on room air.  GENERAL:  This is a well-appearing older white female who is in no acute   distress.  HEENT:  Normocephalic and atraumatic.  EOMI, moist mucous membranes.  NECK:  Supple.  There is no JVD and there is no cervical adenopathy.  CARDIOVASCULAR:  Positive S1, S2, regular rate and rhythm.  No murmurs, rubs,   or gallops appreciated.  PULMONARY:  Clear to auscultation bilaterally.  No wheezes, rubs, or rhonchi   heard.  GASTROINTESTINAL:  Soft, nontender, and nondistended.  Positive bowel sounds.    No hepatosplenomegaly.  EXTREMITIES:  Warm and well-perfused.  No clubbing, cyanosis, or edema.    Capillary refill is less than 3 seconds.  Distal pulses are intact.  The   patient has point tenderness over the right greater trochanter.  NEUROLOGICAL:  A and O x3.  Cranial nerves II-XII grossly intact.    LABORATORY STUDIES:  WBC 9.6, hemoglobin 12.7, hematocrit 38.1, and platelet   count 226.  Sodium 139, potassium 3.7, chloride 106, CO2 of 27, glucose 123,   BUN 20, and creatinine 0.99, GFR 56.  INR is 0.93.    RADIOGRAPHIC STUDIES:  Plain film of the right hip:  Subcapital femoral neck   fracture.    ASSESSMENT AND PLAN:  This is a 66-year-old female who comes in with   complaints of right hip pain, is noted to have a right hip fracture.  1.  Right hip fracture, status post mechanical ground level fall:  Dr. Negron   of orthopedic surgery has been consulted by the ER physician and I look   forward to his recommendations.  I am admitting her to the orthopedic floor   and I will start her on deep venous thrombosis prophylaxis as well as   symptomatic management for her pain.  I will keep her n.p.o. at midnight as  orthopedic surgery will proceed to the OR tomorrow for repair.  I have   ordered  postoperative physical therapy and occupational therapy.  2.  Hypertension:  Continue home Prinzide.  3.  Hyperlipidemia.  Continue Lipitor.  4.  Hypothyroidism.  Continue Synthroid.  5.  Depression and anxiety and posttraumatic stress disorder:  Continue home   psychiatric medications.    DISPOSITION:  Orthopedic.    PROPHYLAXIS:  The patient will be on heparin for DVT prophylaxis, home PPI has   been ordered, stool softeners were ordered.    CODE:  Full.       ____________________________________     CODEY MILLARD MD    DTC / NTS    DD:  04/16/2017 21:33:07  DT:  04/16/2017 21:55:58    D#:  [id number]  Job#:  [id number]

## 2017-04-17 NOTE — CARE PLAN
Transfer pt to floor, assumed care at 2100; Pt A&Ox4, lying on bed; Pt pain 7/10 on his Rt Hip, CMS intact; PIV Lt AC assessed and is patent, CDI, NS runs at 100 ml/hr; Pt is on 2L NC w SaO2 >90%; Call light and personal possessions within reach, discussed safety interventions w pt; Reviewed recent notes, labs, diagnostics, MD orders;  Planning surgery tomorrow AM.          Problem: Safety  Goal: Will remain free from injury  Outcome: PROGRESSING AS EXPECTED  Place pt call light and belongings within reached, pt calls approriately; Instructed to call for assistance, Risk for fall education implemented; Non-skid socks on;  Bed lowered and locked, upper siderails up, Hourly rounding in place      Problem: Venous Thromboembolism (VTW)/Deep Vein Thrombosis (DVT) Prevention:  Goal: Patient will participate in Venous Thrombosis (VTE)/Deep Vein Thrombosis (DVT)Prevention Measures  Outcome: PROGRESSING AS EXPECTED  Heparin meds given    Problem: Pain Management  Goal: Pain level will decrease to patient’s comfort goal  Outcome: PROGRESSING AS EXPECTED  Pt pain is 7-8/10 on her Rt hip. Medicated per MAR.

## 2017-04-17 NOTE — ED NOTES
Med rec complete per patient with a list.  Allergies reviewed.  Pt completed 3 day course of cipro on 3/26 for UTI.

## 2017-04-17 NOTE — PROGRESS NOTES
Received bedside shift report from night RN. Pt AOx4. Pt reports pain is 9/10. Does call appropriately. Bed alarm is laying in bed awaiting surgery today.  Pt is anxious about surgery. PIV assessed and is patent. Pt is on 2 L NC. POC discussed as well as unit routine, comfort, and safety. Discussed the goal for today, surgery and pain management. Reviewed orders, notes, labs, and test results. Hourly rounding in place with RN rounding on odd hours and CNA on even hours.

## 2017-04-17 NOTE — PROGRESS NOTES
Hospital Medicine Progress Note, Adult, Complex               Author: Lyla Espinal Date & Time created: 4/17/2017  3:44 PM     CC: R hip pain    Consults:   Dr. Poole - Ortho    Interval History:  66F hx fibromyalgia, anxiety, depression, HTN, hypothyroid, HLD admitted for R hip pain after fall, found to have R femoral neck fracture.  Now s/p repair    Review of Systems:  Review of Systems   Constitutional: Negative for fever, chills and malaise/fatigue.   HENT: Negative for sore throat.    Respiratory: Negative for cough and shortness of breath.    Cardiovascular: Negative for chest pain and palpitations.   Gastrointestinal: Negative for heartburn, nausea, vomiting, abdominal pain, diarrhea and blood in stool.   Genitourinary: Negative for dysuria and frequency.   Musculoskeletal: Negative for myalgias and back pain.   Neurological: Negative for dizziness, focal weakness, weakness and headaches.   Psychiatric/Behavioral: Negative for depression and hallucinations.   All other systems reviewed and are negative.      Physical Exam:  Physical Exam   Constitutional: No distress.   Neck: No JVD present. No thyromegaly present.   Cardiovascular: Normal rate and regular rhythm.  Exam reveals no friction rub.    No murmur heard.  Pulmonary/Chest: No respiratory distress. She has no wheezes.   Abdominal: She exhibits no distension. There is no tenderness.   Musculoskeletal: She exhibits edema (R hip) and tenderness (R hip).   Skin: No rash noted. She is not diaphoretic. No erythema.   Psychiatric: She has a normal mood and affect. Her behavior is normal.       Labs:        Invalid input(s): [id number]      Recent Labs      04/16/17 1925 04/17/17   0153   SODIUM  139  138   POTASSIUM  3.7  3.6   CHLORIDE  106  107   CO2  27  24   BUN  20  15   CREATININE  0.99  0.87   CALCIUM  9.6  8.7     Recent Labs      04/16/17 1925 04/17/17   0153   ALTSGPT  16   --    ASTSGOT  14   --    ALKPHOSPHAT  55   --     TBILIRUBIN  0.3   --    GLUCOSE  123*  121*     Recent Labs      17   0153   RBC  4.01*  3.64*   HEMOGLOBIN  12.7  11.5*   HEMATOCRIT  38.1  35.3*   PLATELETCT  226  176   PROTHROMBTM  12.7   --    APTT  27.3   --    INR  0.93   --      Recent Labs      17   0153   WBC  9.6  7.5   NEUTSPOLYS  74.30*   --    LYMPHOCYTES  20.40*   --    MONOCYTES  3.80   --    EOSINOPHILS  0.90   --    BASOPHILS  0.20   --    ASTSGOT  14   --    ALTSGPT  16   --    ALKPHOSPHAT  55   --    TBILIRUBIN  0.3   --        Hemodynamics:  Temp (24hrs), Av.1 °C (98.7 °F), Min:36.7 °C (98 °F), Max:37.8 °C (100.1 °F)  Temperature: 37.1 °C (98.7 °F)  Pulse  Av.1  Min: 65  Max: 87Heart Rate (Monitored): 90  Blood Pressure: 101/42 mmHg, NIBP: 120/49 mmHg     Respiratory:    Respiration: 17, Pulse Oximetry: 97 %           Fluids:    Intake/Output Summary (Last 24 hours) at 17 1544  Last data filed at 17 1135   Gross per 24 hour   Intake    980 ml   Output    905 ml   Net     75 ml     Weight: 69.4 kg (153 lb)  GI/Nutrition:  Orders Placed This Encounter   Procedures   • DIET ORDER     Standing Status: Standing      Number of Occurrences: 1      Standing Expiration Date:      Order Specific Question:  Diet:     Answer:  Full Liquid [11]     Medical Decision Making, by Problem:  Active Hospital Problems    Diagnosis   • *Fracture of femoral neck, right (CMS-HCC) [S72.001A]  -POD 0 s/p closed reduction, percutaneous pinning of R femoral neck  -PT/OT  -Toe touch weight bearing, RLE     • Normocytic anemia [D64.9]  -Repeat in AM     • Anxiety [F41.9]  -Klonopin  -Celexa  -Trazodone     • Moderate episode of recurrent major depressive disorder (CMS-HCC) [F33.1]     • HLD (hyperlipidemia) [E78.5]  -Atorva     • GERD (gastroesophageal reflux disease) [K21.9]  -Omeprazole     • Hypothyroidism [E03.9]     • Hypertension [I10]  -Monitor  -Lisinopril/HCTZ  -Renal panel in AM     • Fibromyalgia  [M79.7]  -Lyrica  -Ritalin  -Topamax         Labs reviewed, Medications reviewed and Radiology images reviewed  Ornelas catheter: No Ornelas      DVT Prophylaxis: Enoxaparin (Lovenox)        Assessed for rehab: Patient was assess for and/or received rehabilitation services during this hospitalization

## 2017-04-17 NOTE — CARE PLAN
Problem: Safety  Goal: Will remain free from injury  Outcome: PROGRESSING AS EXPECTED  Pt is using the call light when she needs anything. Surgery scheduled today.     Problem: Pain Management  Goal: Pain level will decrease to patient’s comfort goal  Outcome: PROGRESSING AS EXPECTED  Pt has surgery today and her pain has been managed with morphine 4mg.

## 2017-04-17 NOTE — ED PROVIDER NOTES
ED Provider Note    CHIEF COMPLAINT  Chief Complaint   Patient presents with   • T-5000 GLF   • Hip Pain   • Ankle Pain   • Wrist Pain       HPI  Elizabeth Murphy is a 66 y.o. female who presents to the emergency department complaining of right hip pain. The patient says that she was wearing higher shoes than she normally wears and she walked into her kitchen and twisted her right ankle and fell directly onto her right hip. She was unable to get up because of right hip pain. Right hip pain is moderate in intensity if she stays still but much worse if she tries to move around. She also has mild discomfort of her right ankle right knee and right hand. She did not strike her head or lose consciousness. The patient received intravenous fentanyl by the EMS crew prior to arrival    REVIEW OF SYSTEMS this was a mechanical ground-level fall with no prodrome and otherwise the patient has not recently been ill. All other systems negative    PAST MEDICAL HISTORY  Past Medical History   Diagnosis Date   • Post concussive syndrome      after fall in 1999   • Depression      sees Psych in [place]   • PTSD (post-traumatic stress disorder)    • Anxiety    • Fibromyalgia    • Degenerative joint disease      bulging discs in C-spine, L-spine   • History of mammogram      9/11, diagnostic showed breast cyst, rec 1 year   • Skin cancer      SCC   • Pap test, as part of routine gynecological examination      last 2008, no abn history   • S/P colonoscopy      nl at age 52, due 2013   • Common bile duct (CBD) stricture      hx of in 1990s   • Hypertension    • Hyperlipidemia        FAMILY HISTORY  Family History   Problem Relation Age of Onset   • Cancer Mother        SOCIAL HISTORY  Social History     Social History   • Marital Status:      Spouse Name: N/A   • Number of Children: N/A   • Years of Education: N/A     Social History Main Topics   • Smoking status: Never Smoker    • Smokeless tobacco: Never Used   • Alcohol Use: 0.0  "oz/week     0 Standard drinks or equivalent per week   • Drug Use: No   • Sexual Activity: Yes     Other Topics Concern   • Not on file     Social History Narrative       SURGICAL HISTORY  Past Surgical History   Procedure Laterality Date   • Anna by laparoscopy     • Egd w/endoscopic ultrasound       1990s, common bile duct stricture, no stent       CURRENT MEDICATIONS  Home Medications     **Home medications have not yet been reviewed for this encounter**          ALLERGIES  Allergies   Allergen Reactions   • Ampicillin Diarrhea     Patient stated.   • Latex Hives       PHYSICAL EXAM  VITAL SIGNS: /51 mmHg  Pulse 65  Temp(Src) 36.7 °C (98.1 °F)  Resp 16  Ht 1.626 m (5' 4\")  Wt 69.4 kg (153 lb)  BMI 26.25 kg/m2   Oxygen saturation is interpreted as adequate  Constitutional: No sign of injury to the head  HENT: No sign of trauma to the head  Eyes: Pupils round extraocular motion present  Neck: Trachea midline no JVD no neck pain  Cardiovascular: Regular rate and rhythm  Lungs: Clear and equal bilaterally with no apparent difficulty breathing  Abdomen/Back: Soft nontender, pelvis is stable  Skin: Warm and dry  Musculoskeletal: No acute bony deformity, there is discomfort with movement of the right hip and mild tenderness with palpation she has mild tenderness to the knee but no deformity she has mild tenderness to the right hand but no acute bony deformity and mild tenderness to the right ankle but no acute bony deformity  Neurologic: Awake and verbal and moving her other extremities without difficulty    Laboratory  Preoperative blood work has been ordered and is pending    EKG interpretation  Preop EKG is ordered and pending    Radiology  DX-HAND 3+ RIGHT   Final Result      Angiocatheter overlies the metacarpals and carpus, limiting their analysis      Suspicious for a first metacarpal base fracture. This also could be degenerative given moderate joint space narrowing      Possible triquetral avulsion " fracture. If there is focal carpus level pain, recommend dedicated wrist radiographs         DX-KNEE 3 VIEWS RIGHT   Final Result      Unremarkable knee series.                  INTERPRETING LOCATION:  [address] [place] [address] [place], [address]      DX-CHEST-LIMITED (1 VIEW)   Final Result         No acute cardiac or pulmonary abnormality is identified.      DX-FEMUR-2+ RIGHT   Final Result      Right subcapital femoral neck fracture.      DX-HIP-COMPLETE - UNILATERAL 2+ RIGHT   Final Result      Right subcapital femoral neck fracture.        Regarding the patient's hand x-ray, the pain is along the ulnar aspect of the hand just proximal to the little finger she does not have any acute tenderness at the base of the thumb or throughout the wrist but she says she has chronic discomfort in these areas which has been attributed to arthritis.    MEDICAL DECISION MAKING and DISPOSITION  In the emergency department an IV was established the patient was given morphine and Zofran and kept nothing by mouth. I reviewed the findings thus far available with her. I have reviewed the hip fracture with Dr. Negron who will provide orthopedic consultation and the patient is referred to the hospitalist for hospitalization and further care    IMPRESSION  1. Right hip fracture  2. Mechanical ground-level fall         Electronically signed by: Dion Sanders, 4/16/2017 6:34 PM

## 2017-04-18 LAB
ALBUMIN SERPL BCP-MCNC: 3.4 G/DL (ref 3.2–4.9)
BASOPHILS # BLD AUTO: 0.2 % (ref 0–1.8)
BASOPHILS # BLD: 0.01 K/UL (ref 0–0.12)
BUN SERPL-MCNC: 16 MG/DL (ref 8–22)
CALCIUM SERPL-MCNC: 8.6 MG/DL (ref 8.5–10.5)
CHLORIDE SERPL-SCNC: 103 MMOL/L (ref 96–112)
CO2 SERPL-SCNC: 26 MMOL/L (ref 20–33)
CREAT SERPL-MCNC: 0.99 MG/DL (ref 0.5–1.4)
EKG IMPRESSION: NORMAL
EOSINOPHIL # BLD AUTO: 0.04 K/UL (ref 0–0.51)
EOSINOPHIL NFR BLD: 0.6 % (ref 0–6.9)
ERYTHROCYTE [DISTWIDTH] IN BLOOD BY AUTOMATED COUNT: 42.5 FL (ref 35.9–50)
GFR SERPL CREATININE-BSD FRML MDRD: 56 ML/MIN/1.73 M 2
GLUCOSE SERPL-MCNC: 128 MG/DL (ref 65–99)
HCT VFR BLD AUTO: 31.1 % (ref 37–47)
HGB BLD-MCNC: 10.1 G/DL (ref 12–16)
IMM GRANULOCYTES # BLD AUTO: 0.01 K/UL (ref 0–0.11)
IMM GRANULOCYTES NFR BLD AUTO: 0.2 % (ref 0–0.9)
LYMPHOCYTES # BLD AUTO: 1.36 K/UL (ref 1–4.8)
LYMPHOCYTES NFR BLD: 22 % (ref 22–41)
MCH RBC QN AUTO: 32.1 PG (ref 27–33)
MCHC RBC AUTO-ENTMCNC: 32.5 G/DL (ref 33.6–35)
MCV RBC AUTO: 98.7 FL (ref 81.4–97.8)
MONOCYTES # BLD AUTO: 0.32 K/UL (ref 0–0.85)
MONOCYTES NFR BLD AUTO: 5.2 % (ref 0–13.4)
NEUTROPHILS # BLD AUTO: 4.45 K/UL (ref 2–7.15)
NEUTROPHILS NFR BLD: 71.8 % (ref 44–72)
NRBC # BLD AUTO: 0 K/UL
NRBC BLD AUTO-RTO: 0 /100 WBC
PHOSPHATE SERPL-MCNC: 2.9 MG/DL (ref 2.5–4.5)
PLATELET # BLD AUTO: 140 K/UL (ref 164–446)
PMV BLD AUTO: 10.4 FL (ref 9–12.9)
POTASSIUM SERPL-SCNC: 4 MMOL/L (ref 3.6–5.5)
RBC # BLD AUTO: 3.15 M/UL (ref 4.2–5.4)
SODIUM SERPL-SCNC: 134 MMOL/L (ref 135–145)
WBC # BLD AUTO: 6.2 K/UL (ref 4.8–10.8)

## 2017-04-18 PROCEDURE — 97162 PT EVAL MOD COMPLEX 30 MIN: CPT

## 2017-04-18 PROCEDURE — A9270 NON-COVERED ITEM OR SERVICE: HCPCS | Performed by: HOSPITALIST

## 2017-04-18 PROCEDURE — 99232 SBSQ HOSP IP/OBS MODERATE 35: CPT | Performed by: HOSPITALIST

## 2017-04-18 PROCEDURE — 97165 OT EVAL LOW COMPLEX 30 MIN: CPT

## 2017-04-18 PROCEDURE — 700112 HCHG RX REV CODE 229: Performed by: ORTHOPAEDIC SURGERY

## 2017-04-18 PROCEDURE — G8978 MOBILITY CURRENT STATUS: HCPCS | Mod: CJ

## 2017-04-18 PROCEDURE — 770001 HCHG ROOM/CARE - MED/SURG/GYN PRIV*

## 2017-04-18 PROCEDURE — 85025 COMPLETE CBC W/AUTO DIFF WBC: CPT

## 2017-04-18 PROCEDURE — A9270 NON-COVERED ITEM OR SERVICE: HCPCS | Performed by: ORTHOPAEDIC SURGERY

## 2017-04-18 PROCEDURE — 700101 HCHG RX REV CODE 250: Performed by: HOSPITALIST

## 2017-04-18 PROCEDURE — G8979 MOBILITY GOAL STATUS: HCPCS | Mod: CI

## 2017-04-18 PROCEDURE — 700102 HCHG RX REV CODE 250 W/ 637 OVERRIDE(OP): Performed by: ORTHOPAEDIC SURGERY

## 2017-04-18 PROCEDURE — G8988 SELF CARE GOAL STATUS: HCPCS | Mod: CI

## 2017-04-18 PROCEDURE — 700102 HCHG RX REV CODE 250 W/ 637 OVERRIDE(OP): Performed by: HOSPITALIST

## 2017-04-18 PROCEDURE — 36415 COLL VENOUS BLD VENIPUNCTURE: CPT

## 2017-04-18 PROCEDURE — G8987 SELF CARE CURRENT STATUS: HCPCS | Mod: CJ

## 2017-04-18 PROCEDURE — 700111 HCHG RX REV CODE 636 W/ 250 OVERRIDE (IP): Performed by: ORTHOPAEDIC SURGERY

## 2017-04-18 PROCEDURE — 80069 RENAL FUNCTION PANEL: CPT

## 2017-04-18 RX ADMIN — KETOROLAC TROMETHAMINE 30 MG: 30 INJECTION, SOLUTION INTRAMUSCULAR at 13:33

## 2017-04-18 RX ADMIN — TOPIRAMATE 100 MG: 100 TABLET ORAL at 11:39

## 2017-04-18 RX ADMIN — METHYLPHENIDATE HYDROCHLORIDE 5 MG: 5 TABLET ORAL at 08:50

## 2017-04-18 RX ADMIN — OXYCODONE HYDROCHLORIDE 10 MG: 10 TABLET ORAL at 13:33

## 2017-04-18 RX ADMIN — ATORVASTATIN CALCIUM 40 MG: 20 TABLET, FILM COATED ORAL at 22:53

## 2017-04-18 RX ADMIN — ACETAMINOPHEN 1000 MG: 500 TABLET, FILM COATED ORAL at 05:11

## 2017-04-18 RX ADMIN — DOCUSATE SODIUM 100 MG: 100 CAPSULE ORAL at 22:52

## 2017-04-18 RX ADMIN — DOCUSATE SODIUM 100 MG: 100 CAPSULE ORAL at 08:46

## 2017-04-18 RX ADMIN — OXYCODONE HYDROCHLORIDE 5 MG: 5 TABLET ORAL at 03:42

## 2017-04-18 RX ADMIN — MAGNESIUM HYDROXIDE 30 ML: 400 SUSPENSION ORAL at 22:51

## 2017-04-18 RX ADMIN — POLYETHYLENE GLYCOL 3350 1 PACKET: 17 POWDER, FOR SOLUTION ORAL at 05:10

## 2017-04-18 RX ADMIN — LISINOPRIL 20 MG: 20 TABLET ORAL at 08:45

## 2017-04-18 RX ADMIN — KETOROLAC TROMETHAMINE 30 MG: 30 INJECTION, SOLUTION INTRAMUSCULAR at 22:51

## 2017-04-18 RX ADMIN — PREGABALIN 50 MG: 25 CAPSULE ORAL at 22:52

## 2017-04-18 RX ADMIN — KETOROLAC TROMETHAMINE 30 MG: 30 INJECTION, SOLUTION INTRAMUSCULAR at 05:12

## 2017-04-18 RX ADMIN — TOPIRAMATE 100 MG: 100 TABLET ORAL at 22:53

## 2017-04-18 RX ADMIN — KETOROLAC TROMETHAMINE 30 MG: 30 INJECTION, SOLUTION INTRAMUSCULAR at 18:16

## 2017-04-18 RX ADMIN — CITALOPRAM HYDROBROMIDE 20 MG: 20 TABLET ORAL at 08:47

## 2017-04-18 RX ADMIN — OXYCODONE HYDROCHLORIDE 10 MG: 10 TABLET ORAL at 08:46

## 2017-04-18 RX ADMIN — ACETAMINOPHEN 1000 MG: 500 TABLET, FILM COATED ORAL at 22:51

## 2017-04-18 RX ADMIN — ACETAMINOPHEN 1000 MG: 500 TABLET, FILM COATED ORAL at 18:15

## 2017-04-18 RX ADMIN — CLONAZEPAM 0.5 MG: 1 TABLET ORAL at 14:20

## 2017-04-18 RX ADMIN — ACETAMINOPHEN 1000 MG: 500 TABLET, FILM COATED ORAL at 13:33

## 2017-04-18 RX ADMIN — METHYLPHENIDATE HYDROCHLORIDE 5 MG: 5 TABLET ORAL at 13:34

## 2017-04-18 RX ADMIN — OXYCODONE HYDROCHLORIDE 10 MG: 10 TABLET ORAL at 18:16

## 2017-04-18 RX ADMIN — HYDROCHLOROTHIAZIDE 12.5 MG: 12.5 TABLET ORAL at 08:47

## 2017-04-18 RX ADMIN — PREGABALIN 50 MG: 25 CAPSULE ORAL at 08:45

## 2017-04-18 RX ADMIN — CLONAZEPAM 1 MG: 1 TABLET ORAL at 23:01

## 2017-04-18 RX ADMIN — TRAZODONE HYDROCHLORIDE 50 MG: 50 TABLET ORAL at 22:51

## 2017-04-18 RX ADMIN — STANDARDIZED SENNA CONCENTRATE AND DOCUSATE SODIUM 2 TABLET: 8.6; 5 TABLET, FILM COATED ORAL at 08:47

## 2017-04-18 RX ADMIN — CLONAZEPAM 0.5 MG: 1 TABLET ORAL at 08:44

## 2017-04-18 RX ADMIN — STANDARDIZED SENNA CONCENTRATE AND DOCUSATE SODIUM 2 TABLET: 8.6; 5 TABLET, FILM COATED ORAL at 22:52

## 2017-04-18 RX ADMIN — ENOXAPARIN SODIUM 40 MG: 100 INJECTION SUBCUTANEOUS at 22:53

## 2017-04-18 RX ADMIN — OMEPRAZOLE 20 MG: 20 CAPSULE, DELAYED RELEASE ORAL at 08:46

## 2017-04-18 RX ADMIN — CEFAZOLIN SODIUM 2 G: 2 INJECTION, SOLUTION INTRAVENOUS at 02:50

## 2017-04-18 ASSESSMENT — ENCOUNTER SYMPTOMS
NAUSEA: 0
WHEEZING: 0
DOUBLE VISION: 0
SORE THROAT: 0
MYALGIAS: 0
COUGH: 0
HEARTBURN: 0
WEIGHT LOSS: 0
VOMITING: 0
DIZZINESS: 0
BLOOD IN STOOL: 0
BACK PAIN: 0
HALLUCINATIONS: 0
PHOTOPHOBIA: 0
BLURRED VISION: 0
FOCAL WEAKNESS: 0
DEPRESSION: 0
SHORTNESS OF BREATH: 0
WEAKNESS: 0
HEADACHES: 0
PALPITATIONS: 0
PND: 0
MEMORY LOSS: 0
CHILLS: 0
ABDOMINAL PAIN: 0
FEVER: 0
DIARRHEA: 0

## 2017-04-18 ASSESSMENT — GAIT ASSESSMENTS
GAIT LEVEL OF ASSIST: MINIMAL ASSIST
DEVIATION: STEP TO;BRADYKINETIC
ASSISTIVE DEVICE: FRONT WHEEL WALKER
DISTANCE (FEET): 30

## 2017-04-18 ASSESSMENT — PAIN SCALES - GENERAL
PAINLEVEL_OUTOF10: 5
PAINLEVEL_OUTOF10: 7
PAINLEVEL_OUTOF10: 3
PAINLEVEL_OUTOF10: 7
PAINLEVEL_OUTOF10: 6
PAINLEVEL_OUTOF10: 4
PAINLEVEL_OUTOF10: 3
PAINLEVEL_OUTOF10: 7
PAINLEVEL_OUTOF10: 4

## 2017-04-18 ASSESSMENT — ACTIVITIES OF DAILY LIVING (ADL): TOILETING: INDEPENDENT

## 2017-04-18 NOTE — PROGRESS NOTES
Hospital Medicine Progress Note, Adult, Complex               Author: Katherineleisa Raymondchemo   Date & Time created: 4/18/2017  4:00 PM     Interval History:  67 y/o female with PMHx of HTN, hypothyroidism, Hyperlipidemia, fibromyalgia admitted for a right femoral neck fracture.   S/p Closed reduction percutaneous pinning, right femoral neck fracture.    Review of Systems:  Review of Systems   Constitutional: Negative for fever, chills, weight loss and malaise/fatigue.   HENT: Negative for hearing loss, sore throat and tinnitus.    Eyes: Negative for blurred vision, double vision and photophobia.   Respiratory: Negative for cough, shortness of breath and wheezing.    Cardiovascular: Negative for chest pain, palpitations, leg swelling and PND.   Gastrointestinal: Negative for heartburn, nausea, vomiting, abdominal pain, diarrhea and blood in stool.   Genitourinary: Negative for dysuria and frequency.   Musculoskeletal: Negative for myalgias and back pain.   Neurological: Negative for dizziness, focal weakness, weakness and headaches.   Psychiatric/Behavioral: Negative for depression, suicidal ideas, hallucinations and memory loss.   All other systems reviewed and are negative.      Physical Exam:  Physical Exam   Constitutional: She is oriented to person, place, and time. She appears well-developed and well-nourished. No distress.   HENT:   Head: Normocephalic and atraumatic.   Mouth/Throat: No oropharyngeal exudate.   Eyes: Conjunctivae are normal. Pupils are equal, round, and reactive to light.   Neck: No JVD present. No thyromegaly present.   Cardiovascular: Normal rate and regular rhythm.  Exam reveals no friction rub.    No murmur heard.  Pulmonary/Chest: Effort normal and breath sounds normal. No stridor. No respiratory distress. She has no wheezes.   Abdominal: Soft. Bowel sounds are normal. She exhibits no distension and no mass. There is no tenderness. There is no guarding.   Musculoskeletal: She exhibits edema  (right hip pain) and tenderness.   Neurological: She is alert and oriented to person, place, and time. No cranial nerve deficit.   Skin: No rash noted. She is not diaphoretic. No erythema.   Psychiatric: She has a normal mood and affect. Her behavior is normal. Thought content normal.       Labs:        Invalid input(s): [id number]      Recent Labs      17   033   SODIUM  139  138  134*   POTASSIUM  3.7  3.6  4.0   CHLORIDE  106  107  103   CO2  27  24  26   BUN  20  15  16   CREATININE  0.99  0.87  0.99   PHOSPHORUS   --    --   2.9   CALCIUM  9.6  8.7  8.6     Recent Labs      17   0334   ALTSGPT  16   --    --    ASTSGOT  14   --    --    ALKPHOSPHAT  55   --    --    TBILIRUBIN  0.3   --    --    GLUCOSE  123*  121*  128*     Recent Labs      17   0334   RBC  4.01*  3.64*  3.15*   HEMOGLOBIN  12.7  11.5*  10.1*   HEMATOCRIT  38.1  35.3*  31.1*   PLATELETCT  226  176  140*   PROTHROMBTM  12.7   --    --    APTT  27.3   --    --    INR  0.93   --    --      Recent Labs      17   0334   WBC  9.6  7.5  6.2   NEUTSPOLYS  74.30*   --   71.80   LYMPHOCYTES  20.40*   --   22.00   MONOCYTES  3.80   --   5.20   EOSINOPHILS  0.90   --   0.60   BASOPHILS  0.20   --   0.20   ASTSGOT  14   --    --    ALTSGPT  16   --    --    ALKPHOSPHAT  55   --    --    TBILIRUBIN  0.3   --    --        Hemodynamics:  Temp (24hrs), Av °C (98.6 °F), Min:36.7 °C (98.1 °F), Max:37.3 °C (99.2 °F)  Temperature: 37.2 °C (99 °F)  Pulse  Av.6  Min: 65  Max: 89   Blood Pressure: 112/52 mmHg     Respiratory:    Respiration: 18, Pulse Oximetry: 96 %           Fluids:    Intake/Output Summary (Last 24 hours) at 17 1600  Last data filed at 17 0500   Gross per 24 hour   Intake    720 ml   Output    800 ml   Net    -80 ml        GI/Nutrition:  Orders Placed This Encounter    Procedures   • DIET ORDER     Standing Status: Standing      Number of Occurrences: 1      Standing Expiration Date:      Order Specific Question:  Diet:     Answer:  Regular [1]     Medical Decision Making, by Problem:  Active Hospital Problems    Diagnosis   • *Fracture of femoral neck, right (CMS-[facility]) [S72.001A]  - s/p Closed reduction percutaneous pinning, right femoral neck fracture.  - PT/OT home health?     • Normocytic anemia [D64.9]  - H/H stable, 10.1<11.5, check cbc in the am.      • Anxiety [F41.9]  - continue celexa, klonipine and trazadone.      • Moderate episode of recurrent major depressive disorder (CMS-HCC) [F33.1]  - as above.     • HLD (hyperlipidemia) [E78.5]  - continue lipitor.     • GERD (gastroesophageal reflux disease) [K21.9]  - Continue with omeprazole     • Hypothyroidism [E03.9]  - continue synthroid.     • Hypertension [I10]  - Continue lisinopril, Hctz.     • Fibromyalgia [M79.7]  - Continue ritalin, topomax, and lyrica.       Patient plan of care discussed at multidisplinary team rounds and with patient and R.N at [facility].      Labs reviewed, Medications reviewed and Radiology images reviewed  Ornelas catheter: No Ornelas      DVT Prophylaxis: Enoxaparin (Lovenox)        Assessed for rehab: Patient was assess for and/or received rehabilitation services during this hospitalization

## 2017-04-18 NOTE — CARE PLAN
Received report from day RN, assumed care at 1915; Pt A&Ox4, lying on bed; Pt pain is 7/10 on her Rt hip; CMS intact; Pt is up w 1 assist, FWW; PIV Lt AC assessed and is patent, CDI, sal lock; Pt is on 1L NC w SaO2 >90%; Call light and personal possessions within reach, discussed safety interventions w pt; Reviewed recent notes, labs, diagnostics, MD orders; POC discussed      Problem: Safety  Goal: Will remain free from injury  Outcome: PROGRESSING AS EXPECTED  Place pt call light and belongings within reached, pt calls approriately; Instructed to call for assistance, Risk for fall education implemented; Non-skid socks on when OOB; Bed lowered and locked, upper siderails up; Hourly rounding in place      Problem: Venous Thromboembolism (VTW)/Deep Vein Thrombosis (DVT) Prevention:  Goal: Patient will participate in Venous Thrombosis (VTE)/Deep Vein Thrombosis (DVT)Prevention Measures  Outcome: PROGRESSING AS EXPECTED  SCD's ON. Lovenox given      Problem: Pain Management  Goal: Pain level will decrease to patient’s comfort goal  Outcome: PROGRESSING AS EXPECTED  Scheduled tylenol and toradol given. Declines oxy; Pain decreases to 5/10, pt states is comfortable level. Will continue to monitor     Titrating O2 with IS reinforcement, now on 1L NC. Will continue to monitor/titrate    PT ordered.

## 2017-04-18 NOTE — THERAPY
"Occupational Therapy Evaluation completed.   Functional Status:  Min A supine > EOB, CGA transfers with FWW, max A LB dressing  Plan of Care: Will benefit from Occupational Therapy 3 times per week  Discharge Recommendations:  Equipment: Will Continue to Assess for Equipment Needs. Post-acute therapy Discharge to home with outpatient or home health for additional skilled therapy services.    See \"Rehab Therapy-Acute\" Patient Summary Report for complete documentation.    66 y.o. female s/p GLF with resultant R hip fx. Pt underwent percutaneous pinning. Now TTWB RLE. Pt would benefit from acute OT to maximize functional independence and safety.     "

## 2017-04-18 NOTE — DISCHARGE PLANNING
Met with pt and family at the bedside. Pt feels secure in DC to home with possibly outpt therapies and will speak with her PCP if she feels that she wants Home Health. Pt request of FWW, spoke with BRYANNA ARMANDO ordered. Choice form faxed to [facility].

## 2017-04-18 NOTE — THERAPY
"Physical Therapy Evaluation completed.   Bed Mobility:  Supine to Sit: Minimal Assist  Transfers: Sit to Stand: Minimal Assist  Gait: Level Of Assist: Minimal Assist with Front-Wheel Walker       Plan of Care: Will benefit from Physical Therapy 4 times per week  Discharge Recommendations: Equipment: Will Continue to Assess for Equipment Needs. Post-acute therapy Discharge to a transitional care facility for continued skilled therapy services. Or home health PT services depending on support at home.    See \"Rehab Therapy-Acute\" Patient Summary Report for complete documentation.     "

## 2017-04-18 NOTE — DISCHARGE PLANNING
TCN met with patient at bedside to discuss her transitional care options. Patient lives in a single story home with her  and her adult son. Patient reports prior to fall she was independent with all ADLs and mobility. TCN provided information regarding SNF vs HH vs OP. Currently patient feels he will have substantial assistance at home form her family and friends. Patient is planning to borrow FWW, toilet riser and shower bench from local [facility]. Patient declined HH at this time but is aware she can contact her PCP for a referral if needed. Patient plans to follow up with her ortho MD for OP referral as she progresses in WB and functional needs.     Anticipate patient will DC home with  without transitional care services in 1-2 days pending therapy and medical clearance. TCN to follow as needed.

## 2017-04-19 VITALS
RESPIRATION RATE: 18 BRPM | BODY MASS INDEX: 26.12 KG/M2 | SYSTOLIC BLOOD PRESSURE: 129 MMHG | DIASTOLIC BLOOD PRESSURE: 70 MMHG | HEART RATE: 67 BPM | WEIGHT: 153 LBS | TEMPERATURE: 98 F | OXYGEN SATURATION: 94 % | HEIGHT: 64 IN

## 2017-04-19 PROBLEM — S72.001A FRACTURE OF FEMORAL NECK, RIGHT (HCC): Status: RESOLVED | Noted: 2017-04-16 | Resolved: 2017-04-19

## 2017-04-19 LAB
ERYTHROCYTE [DISTWIDTH] IN BLOOD BY AUTOMATED COUNT: 41.2 FL (ref 35.9–50)
HCT VFR BLD AUTO: 33.2 % (ref 37–47)
HGB BLD-MCNC: 11 G/DL (ref 12–16)
MCH RBC QN AUTO: 31.8 PG (ref 27–33)
MCHC RBC AUTO-ENTMCNC: 33.1 G/DL (ref 33.6–35)
MCV RBC AUTO: 96 FL (ref 81.4–97.8)
PLATELET # BLD AUTO: 192 K/UL (ref 164–446)
PMV BLD AUTO: 9.8 FL (ref 9–12.9)
RBC # BLD AUTO: 3.46 M/UL (ref 4.2–5.4)
WBC # BLD AUTO: 7.6 K/UL (ref 4.8–10.8)

## 2017-04-19 PROCEDURE — G8989 SELF CARE D/C STATUS: HCPCS | Mod: CI

## 2017-04-19 PROCEDURE — 36415 COLL VENOUS BLD VENIPUNCTURE: CPT

## 2017-04-19 PROCEDURE — A9270 NON-COVERED ITEM OR SERVICE: HCPCS | Performed by: ORTHOPAEDIC SURGERY

## 2017-04-19 PROCEDURE — 700112 HCHG RX REV CODE 229: Performed by: ORTHOPAEDIC SURGERY

## 2017-04-19 PROCEDURE — 700102 HCHG RX REV CODE 250 W/ 637 OVERRIDE(OP): Performed by: HOSPITALIST

## 2017-04-19 PROCEDURE — G8979 MOBILITY GOAL STATUS: HCPCS | Mod: CI

## 2017-04-19 PROCEDURE — 700111 HCHG RX REV CODE 636 W/ 250 OVERRIDE (IP): Performed by: ORTHOPAEDIC SURGERY

## 2017-04-19 PROCEDURE — G8980 MOBILITY D/C STATUS: HCPCS | Mod: CI

## 2017-04-19 PROCEDURE — 99239 HOSP IP/OBS DSCHRG MGMT >30: CPT | Performed by: HOSPITALIST

## 2017-04-19 PROCEDURE — G8988 SELF CARE GOAL STATUS: HCPCS | Mod: CI

## 2017-04-19 PROCEDURE — 97530 THERAPEUTIC ACTIVITIES: CPT

## 2017-04-19 PROCEDURE — 700101 HCHG RX REV CODE 250: Performed by: HOSPITALIST

## 2017-04-19 PROCEDURE — 85027 COMPLETE CBC AUTOMATED: CPT

## 2017-04-19 PROCEDURE — 700102 HCHG RX REV CODE 250 W/ 637 OVERRIDE(OP): Performed by: ORTHOPAEDIC SURGERY

## 2017-04-19 PROCEDURE — 97116 GAIT TRAINING THERAPY: CPT

## 2017-04-19 PROCEDURE — A9270 NON-COVERED ITEM OR SERVICE: HCPCS | Performed by: HOSPITALIST

## 2017-04-19 PROCEDURE — 97535 SELF CARE MNGMENT TRAINING: CPT

## 2017-04-19 RX ORDER — AMOXICILLIN 250 MG
2 CAPSULE ORAL 2 TIMES DAILY
Qty: 30 TAB | Refills: 0 | Status: SHIPPED | OUTPATIENT
Start: 2017-04-19 | End: 2017-06-21

## 2017-04-19 RX ORDER — OXYCODONE HYDROCHLORIDE 5 MG/1
5 TABLET ORAL
Qty: 30 TAB | Refills: 0 | Status: SHIPPED | OUTPATIENT
Start: 2017-04-19 | End: 2017-04-29

## 2017-04-19 RX ADMIN — PREGABALIN 50 MG: 25 CAPSULE ORAL at 08:56

## 2017-04-19 RX ADMIN — ONDANSETRON 4 MG: 2 INJECTION INTRAMUSCULAR; INTRAVENOUS at 06:44

## 2017-04-19 RX ADMIN — OMEPRAZOLE 20 MG: 20 CAPSULE, DELAYED RELEASE ORAL at 08:54

## 2017-04-19 RX ADMIN — DOCUSATE SODIUM 100 MG: 100 CAPSULE ORAL at 08:55

## 2017-04-19 RX ADMIN — METHYLPHENIDATE HYDROCHLORIDE 5 MG: 5 TABLET ORAL at 08:54

## 2017-04-19 RX ADMIN — CLONAZEPAM 0.5 MG: 1 TABLET ORAL at 08:56

## 2017-04-19 RX ADMIN — LISINOPRIL 20 MG: 20 TABLET ORAL at 08:56

## 2017-04-19 RX ADMIN — CITALOPRAM HYDROBROMIDE 20 MG: 20 TABLET ORAL at 08:56

## 2017-04-19 RX ADMIN — TOPIRAMATE 100 MG: 100 TABLET ORAL at 08:54

## 2017-04-19 RX ADMIN — KETOROLAC TROMETHAMINE 30 MG: 30 INJECTION, SOLUTION INTRAMUSCULAR at 05:30

## 2017-04-19 RX ADMIN — HYDROCHLOROTHIAZIDE 12.5 MG: 12.5 TABLET ORAL at 08:56

## 2017-04-19 RX ADMIN — ACETAMINOPHEN 1000 MG: 500 TABLET, FILM COATED ORAL at 05:30

## 2017-04-19 ASSESSMENT — LIFESTYLE VARIABLES: EVER_SMOKED: NEVER

## 2017-04-19 ASSESSMENT — GAIT ASSESSMENTS
DISTANCE (FEET): 50
DEVIATION: STEP TO
ASSISTIVE DEVICE: FRONT WHEEL WALKER
GAIT LEVEL OF ASSIST: STAND BY ASSIST

## 2017-04-19 ASSESSMENT — PAIN SCALES - GENERAL: PAINLEVEL_OUTOF10: 3

## 2017-04-19 NOTE — THERAPY
"Occupational Therapy Treatment completed with focus on ADLs, ADL transfers, patient education and caregiver training.  Functional Status:  Supv supine > < EOB, supv transfers with FWW, supv LB dressing with AE, supv toileting  Plan of Care: Patient with no further skilled OT needs in the acute care setting at this time  Discharge Recommendations:  Equipment No Equipment Needed. Post-acute therapy Discharge to home with outpatient or home health for additional skilled therapy services.    See \"Rehab Therapy-Acute\" Patient Summary Report for complete documentation.     Pt seen for OT tx to include: bed mobility, transfer training with FWW, LB undressing and dressing with reacher, sock-aid and dressing stick, toileting. Educated pt and  on safe shower transfer including  providing CGA. Also educated on recommendation to use guest bed as pt normally uses step to access high bed at home. Both verbalize understanding.  has obtained shower chair and 3:1 commode. FWW delivered to pt's room. Pt is completing basic ADL and transfers with no more than supv. No further acute OT needs at this time.    "

## 2017-04-19 NOTE — PROGRESS NOTES
Report received from the night nurse at 0700. Assumed care. Reviewed labs and medications. Pt is A&O x 4. Patient in bed resting. Safety precautions in place. Bed in lowered and locked position. Call light within reach. Updated on plan of care. Plan is to d/c home today.

## 2017-04-19 NOTE — DISCHARGE SUMMARY
Hospital Medicine Discharge Note     Admit Date:  4/16/2017       Discharge Date:   4/19/2017  LOS: 3 days     Primary Care Provider:    Mica Colon M.D.    Attending Physician:  Mike Dallas     Discharge Diagnoses:   Right hip intertrochanteric fracture - s/p ORIF      Chronic Medical Problems:    Fibromyalgia    Hypertension    Hypothyroidism    GERD (gastroesophageal reflux disease)    HLD (hyperlipidemia)    Moderate episode of recurrent major depressive disorder (CMS-HCC)    Normocytic anemia    Anxiety      Consultants:       Orthopaedics     Studies:    Imaging/ Testing:      DX-HIP-COMPLETE - UNILATERAL 2+ RIGHT   Final Result      Closed reduction/percutaneous pinning right femoral neck fracture.      DX-WRIST-COMPLETE 3+ RIGHT   1.  Dorsal soft tissue swelling.   2.  No fracture or dislocation of RIGHT wrist.   3.  Degenerative changes as described.      DX-HAND 3+ RIGHT   Angiocatheter overlies the metacarpals and carpus, limiting their analysis      Suspicious for a first metacarpal base fracture. This also could be degenerative given moderate joint space narrowing      Possible triquetral avulsion fracture. If there is focal carpus level pain, recommend dedicated wrist radiographs         DX-KNEE 3 VIEWS RIGHT   Unremarkable knee series.   DX-CHEST-LIMITED (1 VIEW)   No acute cardiac or pulmonary abnormality is identified.      DX-FEMUR-2+ RIGHT   Right subcapital femoral neck fracture.      DX-HIP-COMPLETE - UNILATERAL 2+ RIGHT   Right subcapital femoral neck fracture.            Procedures:        Closed reduction percutaneous pinning, right femoral neck    fracture.    Hospital Summary (Brief Narrative):         In brief, Elizabeth Murphy is a very pleasant 66 y.o. female who was admitted 4/16/2017 for Right hip intertrochanteric fracture after sustaining a mechanical round level fall. On admission  was consulted, and patient underwent a Closed reduction percutaneous  pinning, right femoral neck  fracture. Post operatively patient has made significant clinical improvement. Her overall pain is controlled, patient has worked very well with physical therapy. Hence, home health was offered, but patient says she has good family support, and would like to be discharged home to her family. All required utilities provided such as a walker, patient has obtained a shower chair. All questions answered. Patient denies fevers/chills, chest pain, shortness of breath or nausea/vommiting.     For H and P on admission, please refer to full H and P dictated by Dr.Cheng RIDER         Disposition:   Discharge home    Condition:  Stable    Activity:   As tolerated     Diet:   Heart Healthy Diet     Discharge Medications:           Medication List      START taking these medications       Instructions    oxycodone immediate-release 5 MG Tabs   Last time this was given:  10 mg on 4/18/2017  6:16 PM   Commonly known as:  ROXICODONE    Take 1 Tab by mouth every 3 hours as needed (Moderate Pain (NRS Pain Scale 4-6; CPOT Pain Scale 3-5)) for up to 10 days.   Dose:  5 mg       senna-docusate 8.6-50 MG Tabs   Last time this was given:  2 Tabs on 4/18/2017 10:52 PM   Commonly known as:  PERICOLACE or SENOKOT S    Take 2 Tabs by mouth 2 Times a Day.   Dose:  2 Tab         CONTINUE taking these medications       Instructions    atorvastatin 40 MG Tabs   Last time this was given:  40 mg on 4/18/2017 10:53 PM   Commonly known as:  LIPITOR    Take 1 Tab by mouth every day.   Dose:  40 mg       CALCIUM-VITAMIN D PO    Take  by mouth.       celecoxib 100 MG Caps   Commonly known as:  CELEBREX    Take 1 Cap by mouth every day.   Dose:  100 mg       citalopram 20 MG Tabs   Last time this was given:  20 mg on 4/19/2017  8:56 AM   Commonly known as:  CELEXA    Take 20 mg by mouth every day.   Dose:  20 mg       clonazepam 1 MG Tabs   Last time this was given:  0.5 mg on 4/19/2017  8:56 AM   Commonly known as:  KLONOPIN     Take 0.5-1 mg by mouth 3 times a day. 0.5mg in am, 0.5mg 1400, 1mg at bedtime.   Dose:  0.5-1 mg       levothyroxine 88 MCG Tabs   Commonly known as:  SYNTHROID    TAKE ONE TABLET BY MOUTH ONCE DAILY       lisinopril-hydrochlorothiazide 20-12.5 MG per tablet   Commonly known as:  PRINZIDE, ZESTORETIC    TAKE 1 TABLET DAILY       LYRICA 50 MG capsule   Last time this was given:  50 mg on 4/19/2017  8:56 AM   Generic drug:  pregabalin    Take 50 mg by mouth 2 times a day.   Dose:  50 mg       methylphenidate 5 MG Tabs   Last time this was given:  5 mg on 4/19/2017  8:54 AM   Commonly known as:  RITALIN    Take 5 mg by mouth 2 times a day. 5mg in am, 5mg at noon.   Dose:  5 mg       omeprazole 20 MG delayed-release capsule   Last time this was given:  20 mg on 4/19/2017  8:54 AM   Commonly known as:  PRILOSEC    Take 20 mg by mouth every day.   Dose:  20 mg       timolol 0.5 % Soln   Commonly known as:  TIMOPTIC    Place 1 Drop in both eyes 2 times a day.   Dose:  1 Drop       topiramate 100 MG Tabs   Last time this was given:  100 mg on 4/19/2017  8:54 AM   Commonly known as:  TOPAMAX    Take 1 Tab by mouth 2 times a day.   Dose:  100 mg       trazodone 50 MG Tabs   Last time this was given:  50 mg on 4/18/2017 10:51 PM   Commonly known as:  DESYREL    Take 50 mg by mouth every evening.   Dose:  50 mg               Follow up appointment details :      I encouraged her to call his PCP to confirm follow up after discharge.    Future Appointments  Date Time Provider Department Center   6/21/2017 2:20 PM TERRI Serna         Instructions:      The were given instructions to return to the ER if patient's condition worsens      Time Spent on Discharge:     Discharge instructions were discussed with the patient at bedside. Patient  expressed understanding and agreed to comply with all discharge instructions.    37 minutes were spent in the discharge planning and management of this  patient,  including more than 50% of the time spent face to face in   Counseling.

## 2017-04-19 NOTE — CARE PLAN
Problem: Safety  Goal: Will remain free from injury  Outcome: PROGRESSING AS EXPECTED  Safety precautions in place. Call light within reach. Bed is low and in locked position. Hourly rounding in place.     Problem: Discharge Barriers/Planning  Goal: Patient’s continuum of care needs will be met  Outcome: PROGRESSING AS EXPECTED  In the anticipation of discharge planning.

## 2017-04-19 NOTE — CARE PLAN
Received report from day RN, assumed care at 1915; Pt A&Ox4, lying on bed; Pt pain is 4/10 on her Rt hip; Dressing in place, CDI; CMS intact; Pt is up w 1 assist, FWW, TTWB RLE; PIV Lt AC assessed and is patent, CDI, sal lock; Pt is on 3L NC w SaO2 >90%; Call light and personal possessions within reach, discussed safety interventions w pt; Reviewed recent notes, labs, diagnostics, MD orders; POC discussed      Problem: Safety  Goal: Will remain free from injury  Outcome: PROGRESSING AS EXPECTED  Place pt call light and belongings within reached, pt calls approriately; Instructed to call for assistance, Risk for fall education implemented; Non-skid socks on when OOB; Bed lowered and locked, upper siderails up; Hourly rounding in place      Problem: Venous Thromboembolism (VTW)/Deep Vein Thrombosis (DVT) Prevention:  Goal: Patient will participate in Venous Thrombosis (VTE)/Deep Vein Thrombosis (DVT)Prevention Measures  Outcome: PROGRESSING AS EXPECTED  SCD's ON. Lovenox given. Ambulating when up to bathroom      Problem: Pain Management  Goal: Pain level will decrease to patient’s comfort goal  Outcome: PROGRESSING AS EXPECTED  Pt pain is 4/10 on her Rt hip; Medicated per MAR. Pt pain is under control with current pain regimen; Pt able to rest and sleep; Will continue to monitor     D/c plan: Home vs home with home health/PT services?

## 2017-04-19 NOTE — DISCHARGE INSTRUCTIONS
Discharge Instructions    Discharged to home by car with relative. Discharged via wheelchair, hospital escort: Yes.  Special equipment needed: Walker    Be sure to schedule a follow-up appointment with your primary care doctor or any specialists as instructed.     Discharge Plan:   Influenza Vaccine Indication: Not indicated: Previously immunized this influenza season and > 8 years of age    I understand that a diet low in cholesterol, fat, and sodium is recommended for good health. Unless I have been given specific instructions below for another diet, I accept this instruction as my diet prescription.   Other diet: Regular diet as tolerated    Special Instructions: Discharge instructions for the Orthopedic Patient      Closed Reduction for Artificial Hip Dislocation, Care After  Refer to this sheet in the next few weeks. These instructions provide you with information on caring for yourself after your procedure. Your health care provider may also give you more specific instructions. Your treatment has been planned according to current medical practices, but problems sometimes occur. Call your health care provider if you have any problems or questions after your procedure.  WHAT TO EXPECT AFTER THE PROCEDURE  After your procedure, it is typical to have the following:   · Hip pain for about a week. The pain should improve each day.  · Difficulty walking or doing your usual daily activities.  It may take a few weeks to recover completely.  HOME CARE INSTRUCTIONS   · Resume your regular activities as directed by your health care provider. Stop doing an activity if it causes pain or discomfort.  · Ask people to help you with activities that are difficult.  · Apply ice to the injured area:  ¨ Put ice in a plastic bag.  ¨ Place a towel between your skin and the bag.  ¨ Leave the ice on for 15-20 minutes, 3-4 times a day.  · Take medicines only as directed by your health care provider.  · Wear your brace as directed by  your health care provider.  · Use crutches as instructed. When you stop using your crutches, move slowly at first.  · If you have a plaster or fiberglass cast:  ¨ Keep your cast dry and clean.  ¨ Do not put pressure on any part of your cast until it has completely hardened.  ¨ Do not use sharp or pointed objects to try to scratch the skin under the cast.  ¨ Check the skin around the cast every day. You may put lotion on any red or sore areas.  ¨ Protect your cast with a plastic bag while bathing. Do not lower the cast into water.  · Keep all follow-up visits as directed by your health care provider. This is important. You may need to see a physical therapist for rehabilitation.  · Do range-of-motion exercises at home as directed by your physical therapist.  SEEK MEDICAL CARE IF:   · It is not getting easier to walk or move.  · Your calf or leg swells or feels tender.  · The swelling around your hip gets worse.  · Any part of your hip or leg feels numb or tingles.  · Your pain medicine is not helping.  SEEK IMMEDIATE MEDICAL CARE IF:   · Your hip dislocates again.  · You have difficulty breathing.  · You have chest pain.    Oxycodone tablets or capsules  What is this medicine?  OXYCODONE (ox i KOE done) is a pain reliever. It is used to treat moderate to severe pain.  This medicine may be used for other purposes; ask your health care provider or pharmacist if you have questions.  COMMON BRAND NAME(S): Dazidox , Endocodone , OXECTA, OxyIR, Percolone, Roxicodone  What should I tell my health care provider before I take this medicine?  They need to know if you have any of these conditions:  -Montgomery's disease  -brain tumor  -drug abuse or addiction  -head injury  -heart disease  -if you frequently drink alcohol containing drinks  -kidney disease or problems going to the bathroom  -liver disease  -lung disease, asthma, or breathing problems  -mental problems  -an unusual or allergic reaction to oxycodone, codeine,  hydrocodone, morphine, other medicines, foods, dyes, or preservatives  -pregnant or trying to get pregnant  -breast-feeding  How should I use this medicine?  Take this medicine by mouth with a glass of water. Follow the directions on the prescription label. You can take it with or without food. If it upsets your stomach, take it with food. Take your medicine at regular intervals. Do not take it more often than directed. Do not stop taking except on your doctor's advice.  Some brands of this medicine, like Oxecta, have special instructions. Ask your doctor or pharmacist if these directions are for you: Do not cut, crush or chew this medicine. Swallow only one tablet at a time. Do not wet, soak, or lick the tablet before you take it.  Talk to your pediatrician regarding the use of this medicine in children. Special care may be needed.  Overdosage: If you think you have taken too much of this medicine contact a poison control center or emergency room at once.  NOTE: This medicine is only for you. Do not share this medicine with others.  What if I miss a dose?  If you miss a dose, take it as soon as you can. If it is almost time for your next dose, take only that dose. Do not take double or extra doses.  What may interact with this medicine?  -alcohol  -antihistamines  -certain medicines used for nausea like chlorpromazine, droperidol  -erythromycin  -ketoconazole  -medicines for depression, anxiety, or psychotic disturbances  -medicines for sleep  -muscle relaxants  -naloxone  -naltrexone  -narcotic medicines (opiates) for pain  -nilotinib  -phenobarbital  -phenytoin  -rifampin  -ritonavir  -voriconazole  This list may not describe all possible interactions. Give your health care provider a list of all the medicines, herbs, non-prescription drugs, or dietary supplements you use. Also tell them if you smoke, drink alcohol, or use illegal drugs. Some items may interact with your medicine.  What should I watch for while  using this medicine?  Tell your doctor or health care professional if your pain does not go away, if it gets worse, or if you have new or a different type of pain. You may develop tolerance to the medicine. Tolerance means that you will need a higher dose of the medicine for pain relief. Tolerance is normal and is expected if you take this medicine for a long time.  Do not suddenly stop taking your medicine because you may develop a severe reaction. Your body becomes used to the medicine. This does NOT mean you are addicted. Addiction is a behavior related to getting and using a drug for a non-medical reason. If you have pain, you have a medical reason to take pain medicine. Your doctor will tell you how much medicine to take. If your doctor wants you to stop the medicine, the dose will be slowly lowered over time to avoid any side effects.  You may get drowsy or dizzy when you first start taking this medicine or change doses. Do not drive, use machinery, or do anything that may be dangerous until you know how the medicine affects you. Stand or sit up slowly.  There are different types of narcotic medicines (opiates) for pain. If you take more than one type at the same time, you may have more side effects. Give your health care provider a list of all medicines you use. Your doctor will tell you how much medicine to take. Do not take more medicine than directed. Call emergency for help if you have problems breathing.  This medicine will cause constipation. Try to have a bowel movement at least every 2 to 3 days. If you do not have a bowel movement for 3 days, call your doctor or health care professional.  Your mouth may get dry. Drinking water, chewing sugarless gum, or sucking on hard candy may help. See your dentist every 6 months.  What side effects may I notice from receiving this medicine?  Side effects that you should report to your doctor or health care professional as soon as possible:  -allergic reactions  like skin rash, itching or hives, swelling of the face, lips, or tongue  -breathing problems  -confusion  -feeling faint or lightheaded, falls  -trouble passing urine or change in the amount of urine  -unusually weak or tired  Side effects that usually do not require medical attention (report to your doctor or health care professional if they continue or are bothersome):  -constipation  -dry mouth  -itching  -nausea, vomiting  -upset stomach  This list may not describe all possible side effects. Call your doctor for medical advice about side effects. You may report side effects to FDA at 9-524-FDA-0230.  Where should I keep my medicine?  Keep out of the reach of children. This medicine can be abused. Keep your medicine in a safe place to protect it from theft. Do not share this medicine with anyone. Selling or giving away this medicine is dangerous and against the law.  Store at room temperature between 15 and 30 degrees C (59 and 86 degrees F). Protect from light. Keep container tightly closed.   Discard unused medicine and used packaging carefully. Pets and children can be harmed if they find used or lost packages. Flush any unused medicines down the toilet. Do not use the medicine after the expiration date.  NOTE: This sheet is a summary. It may not cover all possible information. If you have questions about this medicine, talk to your doctor, pharmacist, or health care provider.  © 2014, [employer]. (11/15/2012 8:33:37 AM)       This information is not intended to replace advice given to you by your health care provider. Make sure you discuss any questions you have with your health care provider.     Document Released: 07/07/2006 Document Revised: 01/08/2016 Document Reviewed: 05/07/2015  [employer] Interactive Patient Education ©2016 [employer]    Follow up with Primary Care Physician within 2 weeks of discharge to home, regarding:  Review of medications and diagnostic testing.  Surveillance for  medical complications.  Workup and treatment of osteoporosis, if appropriate.     -Is this a Joint Replacement patient? No    -Is this patient being discharged with medication to prevent blood clots?  No    · Is patient discharged on Warfarin / Coumadin?   No     · Is patient Post Blood Transfusion?  No    Depression / Suicide Risk    As you are discharged from this [facility] facility, it is important to learn how to keep safe from harming yourself.    Recognize the warning signs:  · Abrupt changes in personality, positive or negative- including increase in energy   · Giving away possessions  · Change in eating patterns- significant weight changes-  positive or negative  · Change in sleeping patterns- unable to sleep or sleeping all the time   · Unwillingness or inability to communicate  · Depression  · Unusual sadness, discouragement and loneliness  · Talk of wanting to die  · Neglect of personal appearance   · Rebelliousness- reckless behavior  · Withdrawal from people/activities they love  · Confusion- inability to concentrate     If you or a loved one observes any of these behaviors or has concerns about self-harm, here's what you can do:  · Talk about it- your feelings and reasons for harming yourself  · Remove any means that you might use to hurt yourself (examples: pills, rope, extension cords, firearm)  · Get professional help from the community (Mental Health, Substance Abuse, psychological counseling)  · Do not be alone:Call your Safe Contact- someone whom you trust who will be there for you.  · Call your local CRISIS HOTLINE 301-8738 or 716-722-7477  · Call your local Children's Mobile Crisis Response Team [place] (606) 285-5246 or www.[employer]  · Call the toll free National Suicide Prevention Hotlines   · National Suicide Prevention Lifeline 604-260-EGJG (4156)  · National Hope Line Network 800-SUICIDE (115-1048)        Open reduction and internal fixation (ORIF) is a common surgery  used to fix a hip fracture or a broken hip. A surgical cut (incision) in the skin will be made to open the fracture area. This lets the surgeon see the broken bone. The bone pieces will then be put back together. Hardware, such as screws, pins, rods, or a metal plate, will be used to hold the bones in place.  LET YOUR HEALTH CARE PROVIDER KNOW ABOUT:  · Any allergies you have.  · All medicines you are taking, including vitamins, herbs, eye drops, creams, and over-the-counter medicines.  · Previous problems you or members of your family have had with the use of anesthetics.  · Any blood disorders you have.  · Previous surgeries you have had.  · Medical conditions you have.  · Your tobacco use.  RISKS AND COMPLICATIONS   Generally, this is a safe procedure. However, problems can occur and include:  · Blood clots in the legs or lungs.  · Bleeding.  · Infection.  · Lung infection (pneumonia).  · Continuing pain.  · Difficulty walking.  BEFORE THE PROCEDURE  · A medical evaluation will be done. This examination will include checking your heart and lungs.  · You may have imaging tests, including:  · X-rays to find exactly where the break is.  · CT scan to get a better view of the broken hip.  · MRI to check for any hidden fracture that cannot be seen on X-ray or CT scan.  · You may also have blood and urine tests.  · Ask your health care provider about changing or stopping your regular medicines. This is especially important if you are taking diabetes medicines or blood thinners, such as aspirin and ibuprofen. Do not take these medicines before your procedure if your health care provider asks you not to.  · Do not eat or drink anything after midnight on the night before the procedure or as directed by your health care provider.  · Plan to have someone take you home after you are released from the hospital.  PROCEDURE  · You will be given one of the following:  · A medicine that numbs the area (local anesthetic).  · A  medicine that makes you fall asleep (general anesthetic).  · Small monitors will be put on your body. They will be used to check your heart, blood pressure, and oxygen level.  · An IV tube will be inserted in your arm. Medicine will flow directly into your body through the IV tube.  · A catheter may be inserted into your bladder to collect urine while you are asleep.  · Your hip area will be scrubbed with a germ-killing solution (antiseptic).  · When you are asleep or numb, the surgeon will move your bones back into normal alignment and position.  · X-rays may be taken to check the position of the bones.  · A skin incision will be made over the hip. It will go through your muscles to the broken bone.  · The bones will be secured. Hardware will be used to hold the bone together.  · The incision will be closed with small stitches (sutures) or staples.  · A bandage (dressing) or some other kind of wound cover will be placed over the incision.  AFTER THE PROCEDURE  2. Your blood pressure, heart rate, breathing rate, and blood oxygen level will be monitored often until the medicines you were given have worn off.  3. You may continue to receive fluids or pain medicines through the IV tube.  4. It is important for you to be up and moving as soon as possible after this procedure.  5. Physical therapists will help you to start walking.  6. You may need to use a walker or crutches after the procedure.  7. Follow your health care provider's instructions about bearing weight on your injured leg.  8. You may have to wear compression stockings. These stockings help to prevent blood clots and reduce swelling in your legs.     This information is not intended to replace advice given to you by your health care provider. Make sure you discuss any questions you have with your health care provider.     Document Released: 12/06/2010 Document Revised: 01/08/2016 Document Reviewed: 05/21/2015  Elsevier Interactive Patient Education ©2016  [employer]vier Inc.    Refer to this sheet in the next few weeks. These instructions provide you with information about caring for yourself after your procedure. Your health care provider may also give you more specific instructions. Your treatment has been planned according to current medical practices, but problems sometimes occur. Call your health care provider if you have any problems or questions after your procedure.  WHAT TO EXPECT AFTER THE PROCEDURE  After your procedure, it is typical to have the following:  · Pain.  · Swelling.  · Difficulty walking.  HOME CARE INSTRUCTIONS  · Have someone help you with everyday activities, such as showering and meals, for the first week after you leave the hospital or as directed.  · Take medicines only as directed by your health care provider. Do not take any over-the-counter medicines without approval from your health care provider. Medicines such as aspirin and ibuprofen can increase your risk of bleeding.  · Constipation is common after surgery from the pain medicines used and the decrease in your activity level. It is important to drink fluids and to increase your intake of fruits and vegetables. Stool softeners may be prescribed by your health care provider.  · There are many different ways to close and cover an incision, including stitches, skin glue, and adhesive strips. Follow your health care provider's instructions about:  ¨ Incision care.  ¨ Bandage (dressing) changes and removal.  ¨ Incision closure removal.  · Wear compression stockings as directed by your health care provider. These stockings help to prevent blood clots and reduce swelling in your legs.  · Do not take baths, swim, or use a hot tub until your health care provider approves.  · Use crutches or a walker as directed by your health care provider.  · Be sure to do any exercises that your physical therapist suggests. These exercises will help to make your hip stronger and help you to recover more  quickly.  · Ask your health care provider when you can resume other activities, such as work, driving, and sex.  · Do not drive or operate heavy machinery while taking pain medicine.  · Keep all follow-up visits as directed by your health care provider. This is important.  SEEK MEDICAL CARE IF:  · You have fatigue.  · You feel weak.  · Your pain is not relieved by medicines.  · You have a fever.  · You have drainage, redness, swelling, or pain at your incision.  SEEK IMMEDIATE MEDICAL CARE IF:  · Your incision is bleeding.  · Your leg or foot is painful and swollen.  · Your leg is pale or blue.  · Your leg is cold.  · Your leg tingles or is numb.  · You have trouble breathing.  · You have chest pain.     This information is not intended to replace advice given to you by your health care provider. Make sure you discuss any questions you have with your health care provider.     Document Released: 07/15/2015 Document Reviewed: 07/15/2015  [employer] Interactive Patient Education ©2016 [employer] Inc.

## 2017-04-19 NOTE — THERAPY
"Physical Therapy Treatment completed.   Bed Mobility:  Supine to Sit: Stand by Assist  Transfers: Sit to Stand: Stand by Assist  Gait: Level Of Assist: Stand by Assist with FWW x 50 feet       Plan of Care: Patient with no further skilled PT needs in the acute care setting at this time  Discharge Recommendations: Equipment: Front-Wheel Walker. Post-acute therapy Discharge to home with outpatient or home health for additional skilled therapy services.     See \"Rehab Therapy-Acute\" Patient Summary Report for complete documentation.       "

## 2017-04-19 NOTE — PROGRESS NOTES
Patient ambulating to bathroom and voiding. PO pain medications. Does well with limited weight bearing right le. Patient calls for assistance.

## 2017-06-21 ENCOUNTER — TELEPHONE (OUTPATIENT)
Dept: MEDICAL GROUP | Facility: PHYSICIAN GROUP | Age: 67
End: 2017-06-21

## 2017-06-21 ENCOUNTER — OFFICE VISIT (OUTPATIENT)
Dept: MEDICAL GROUP | Facility: PHYSICIAN GROUP | Age: 67
End: 2017-06-21
Payer: COMMERCIAL

## 2017-06-21 VITALS
HEIGHT: 64 IN | HEART RATE: 68 BPM | SYSTOLIC BLOOD PRESSURE: 108 MMHG | DIASTOLIC BLOOD PRESSURE: 74 MMHG | BODY MASS INDEX: 25.1 KG/M2 | WEIGHT: 147 LBS | TEMPERATURE: 97.3 F | OXYGEN SATURATION: 97 % | RESPIRATION RATE: 16 BRPM

## 2017-06-21 DIAGNOSIS — E78.5 DYSLIPIDEMIA: ICD-10-CM

## 2017-06-21 DIAGNOSIS — F33.1 MODERATE EPISODE OF RECURRENT MAJOR DEPRESSIVE DISORDER (HCC): ICD-10-CM

## 2017-06-21 DIAGNOSIS — I10 ESSENTIAL HYPERTENSION: ICD-10-CM

## 2017-06-21 DIAGNOSIS — S72.051D CLOSED FRACTURE OF HEAD OF RIGHT FEMUR WITH ROUTINE HEALING: ICD-10-CM

## 2017-06-21 DIAGNOSIS — N39.0 RECURRENT UTI (URINARY TRACT INFECTION): ICD-10-CM

## 2017-06-21 DIAGNOSIS — E03.9 HYPOTHYROIDISM, UNSPECIFIED TYPE: ICD-10-CM

## 2017-06-21 DIAGNOSIS — Z12.39 SCREENING FOR BREAST CANCER: ICD-10-CM

## 2017-06-21 DIAGNOSIS — M79.7 FIBROMYALGIA: ICD-10-CM

## 2017-06-21 DIAGNOSIS — N60.01 CYST OF RIGHT BREAST: ICD-10-CM

## 2017-06-21 PROBLEM — M80.052D: Status: ACTIVE | Noted: 2017-06-21

## 2017-06-21 PROCEDURE — 99214 OFFICE O/P EST MOD 30 MIN: CPT | Performed by: NURSE PRACTITIONER

## 2017-06-21 RX ORDER — OXYCODONE HYDROCHLORIDE 5 MG/1
5 TABLET ORAL EVERY 4 HOURS PRN
COMMUNITY
End: 2017-12-28

## 2017-06-21 RX ORDER — GABAPENTIN 800 MG/1
800 TABLET ORAL 3 TIMES DAILY
Qty: 270 TAB | Refills: 1 | Status: SHIPPED | OUTPATIENT
Start: 2017-06-21 | End: 2018-02-20 | Stop reason: SDUPTHER

## 2017-06-21 RX ORDER — FLUTICASONE PROPIONATE 50 MCG
1 SPRAY, SUSPENSION (ML) NASAL DAILY
COMMUNITY
End: 2018-07-12

## 2017-06-21 RX ORDER — GABAPENTIN 800 MG/1
800 TABLET ORAL 3 TIMES DAILY
COMMUNITY
End: 2017-06-21 | Stop reason: SDUPTHER

## 2017-06-21 ASSESSMENT — PATIENT HEALTH QUESTIONNAIRE - PHQ9: CLINICAL INTERPRETATION OF PHQ2 SCORE: 0

## 2017-06-21 NOTE — ASSESSMENT & PLAN NOTE
This is a chronic condition, stable and well-controlled on current regimen including oxycodone and as needed Celebrex. She is followed by pain management through [employer] provider.

## 2017-06-21 NOTE — ASSESSMENT & PLAN NOTE
The patient's last visit with me in March 2017 she was referred to urology for recurrent UTIs. She did undergo urodynamic testing and has been put on a daily antibiotic. We will request those outside records. She does have a follow-up appointment with that provider tomorrow.

## 2017-06-21 NOTE — ASSESSMENT & PLAN NOTE
Patient has cysts of the right breast and is due for a diagnostic mammogram, this has been ordered. She is encouraged to continue with monthly breast self exams.

## 2017-06-21 NOTE — ASSESSMENT & PLAN NOTE
This is a chronic condition, stable and well-controlled on current regimen, 88 µg of levothyroxine. She tolerates this medication well with no significant bothersome side effects. Her labs are within normal limits. She does deny symptoms of hypothyroidism, she understands to take this medication on an empty stomach aside from her other medications. She will be due for labs again in 6 months before she follows up with me at that time.

## 2017-06-21 NOTE — ASSESSMENT & PLAN NOTE
This occurred 6 weeks ago, she fell and hit her R hip on Easter Sunday and suffered complete fracture of the R femoral head. She didn't undergo ORIF of the right hip and is recovering without difficulty. She does have an upcoming appointment with orthopedic surgeon in 2 weeks. She reports that her surgical incision is healing well. She states that they checked her for osteoporosis and found that the bone scan was completely within normal limits. She also reports that she did not have to undergo any type of physical therapy.  She does wonder if her healing and her return to activity is slower than usual as she continues to have muscular pain along the lateral aspect of her right leg. She also states that since she had surgery, she has gone on vacation and has done a lot of walking and climbing up and down stairs. I did discuss with her that she is more active than the usual postoperative patient and that she needs to probably rest when her body tells her to rest. I encouraged her to discuss this with her surgeon at her follow-up appointment, sooner if needed.

## 2017-06-21 NOTE — ASSESSMENT & PLAN NOTE
This is a chronic condition, stable and well-controlled on current regimen. Her blood pressure is 108/74 and she denies symptoms of hypertension. She does not need refills at this time, but can call when needed. She tolerates her medications well with no significant bothersome side effects. She will be due for labs again before she follows up with me in 6 months, these have been ordered. She is to continue with healthy eating, regular physical activity and continued efforts towards maintaining healthy weight.

## 2017-06-21 NOTE — MR AVS SNAPSHOT
"        Elizabeth Matthews Murphy   2017 2:20 PM   Office Visit   MRN: [id number]    Department:  [facility]   Dept Phone:  [phone]    Description:  Female : 1950   Provider:  TERRI Serna           Reason for Visit     Establish Care chronic UTI-fv urology, fibromyalgia      Allergies as of 2017     Allergen Noted Reactions    Ampicillin 2015   Diarrhea    Patient stated.    Latex 2015   Hives      You were diagnosed with     Moderate episode of recurrent major depressive disorder (CMS-HCC)   [5809625]       Closed fracture of head of right femur with routine healing   [521240]       Hypothyroidism, unspecified type   [4273738]       Essential hypertension   [7942724]       Dyslipidemia   [707598]       Fibromyalgia   [919794]       Recurrent UTI (urinary tract infection)   [515157]       Screening for breast cancer   [743376]         Vital Signs     Blood Pressure Pulse Temperature Respirations Height Weight    108/74 mmHg 68 36.3 °C (97.3 °F) 16 1.626 m (5' 4.02\") 66.679 kg (147 lb)    Body Mass Index Oxygen Saturation Smoking Status             25.22 kg/m2 97% Never Smoker          Basic Information     Date Of Birth Sex Race Ethnicity Preferred Language    1950 Female White Non- English      Problem List              ICD-10-CM Priority Class Noted - Resolved    Dyslipidemia E78.5   3/5/2013 - Present    Fibromyalgia M79.7   3/5/2013 - Present    Migraine    3/5/2013 - Present    Hypertension I10   3/6/2013 - Present    Vitamin D deficiency disease E55.9   2013 - Present    Common bile duct (CBD) stricture K83.1   3/3/2014 - Present    Chronic back pain M54.9, G89.29   3/4/2014 - Present    Hypothyroidism E03.9   2014 - Present    Mass of left foot R22.42   2014 - Present    Elevated fasting glucose R73.01   2014 - Present    Breast cyst N60.09   2015 - Present    GERD (gastroesophageal reflux disease) K21.9   2015 - " Present    Chronic kidney disease, stage 3 N18.3   2/5/2015 - Present    Lumbar radiculopathy M54.16   5/10/2015 - Present    Yeast infection of the skin B37.2   6/17/2015 - Present    Vaginal atrophy N95.2   1/21/2016 - Present    HLD (hyperlipidemia) E78.5   4/20/2016 - Present    Dysuria R30.0   7/27/2016 - Present    Well woman exam with routine gynecological exam Z01.419   12/2/2016 - Present    Cyst of right breast N60.01   12/2/2016 - Present    Moderate episode of recurrent major depressive disorder (CMS-HCC) F33.1   2/21/2017 - Present    Excessive and redundant skin and subcutaneous tissue L98.7   2/21/2017 - Present    Recurrent UTI (urinary tract infection) N39.0   3/17/2017 - Present    Chronic sinusitis J32.9   3/17/2017 - Present    Normocytic anemia D64.9   4/17/2017 - Present    Anxiety F41.9   4/17/2017 - Present    Closed fracture of head of right femur with routine healing S72.051D   6/21/2017 - Present      Health Maintenance        Date Due Completion Dates    IMM DTaP/Tdap/Td Vaccine (1 - Tdap) 9/27/1971 ---    IMM ZOSTER VACCINE 9/27/2012 ---    MAMMOGRAM 12/15/2017 12/15/2016, 5/9/2016, 4/27/2016, 4/7/2015, 12/11/2013, 10/25/2011, 10/7/2011    PAP SMEAR 12/2/2019 12/2/2016, 2/1/2012    COLONOSCOPY 10/8/2024 10/8/2014            Current Immunizations     Influenza Vaccine Quad Inj (Pf) 1/20/2016, 10/9/2014 10:30 AM    Influenza Vaccine Quad Inj (Preserved) 10/31/2016, 10/20/2015      Below and/or attached are the medications your provider expects you to take. Review all of your home medications and newly ordered medications with your provider and/or pharmacist. Follow medication instructions as directed by your provider and/or pharmacist. Please keep your medication list with you and share with your provider. Update the information when medications are discontinued, doses are changed, or new medications (including over-the-counter products) are added; and carry medication information at all  times in the event of emergency situations     Allergies:  AMPICILLIN - Diarrhea     LATEX - Hives               Medications  Valid as of: June 21, 2017 -  2:51 PM    Generic Name Brand Name Tablet Size Instructions for use    Atorvastatin Calcium (Tab) LIPITOR 40 MG Take 1 Tab by mouth every day.        Calcium-Vitamin D   Take  by mouth.        Celecoxib (Cap) CELEBREX 100 MG Take 1 Cap by mouth every day.        Citalopram Hydrobromide (Tab) CELEXA 20 MG Take 20 mg by mouth every day.        ClonazePAM (Tab) KLONOPIN 1 MG Take 0.5-1 mg by mouth 3 times a day. 0.5mg in am, 0.5mg 1400, 1mg at bedtime.        Fluticasone Propionate (Suspension) FLONASE 50 MCG/ACT Spray 1 Spray in nose every day.        Gabapentin (Tab) NEURONTIN 800 MG Take 1 Tab by mouth 3 times a day.        Levothyroxine Sodium (Tab) SYNTHROID 88 MCG TAKE ONE TABLET BY MOUTH ONCE DAILY        Lisinopril-Hydrochlorothiazide (Tab) PRINZIDE, ZESTORETIC 20-12.5 MG TAKE 1 TABLET DAILY        Omeprazole (CAPSULE DELAYED RELEASE) PRILOSEC 20 MG Take 20 mg by mouth every day.        OxyCODONE HCl (Tab) ROXICODONE 5 MG Take 5 mg by mouth every four hours as needed for Severe Pain.        Topiramate (Tab) TOPAMAX 100 MG Take 1 Tab by mouth 2 times a day.        TraZODone HCl (Tab) DESYREL 50 MG Take 50 mg by mouth every evening.        .                 Medicines prescribed today were sent to:     [address] [address]    Phone: [phone] Fax: [phone]    Open 24 Hours?: No    [facility] - [place], [place] - [address] [address]    Phone: [phone] Fax: [phone]    Open 24 Hours?: No    [facility] - [place], [place] - [facility] [address]    Phone: [phone] Fax: [phone]    Open 24 Hours?: No      Medication refill instructions:        If your prescription bottle indicates you have medication refills left, it is not necessary to call your provider’s office. Please contact your pharmacy and they will refill your medication.    If your prescription bottle indicates you do not have any refills left, you may request refills at any time through one of the following ways: The online [employer] system (except Urgent Care), by calling your provider’s office, or by asking your pharmacy to contact your provider’s office with a refill request. Medication refills are processed only during regular business hours and may not be available until the next business day. Your provider may request additional information or to have a follow-up visit with you prior to refilling your medication.   *Please Note: Medication refills are assigned a new Rx number when refilled electronically. Your pharmacy may indicate that no refills were authorized even though a new prescription for the same medication is available at the pharmacy. Please request the medicine by name with the pharmacy before contacting your provider for a refill.        Your To Do List     Future Labs/Procedures Complete By Expires    COMP METABOLIC PANEL  As directed 6/21/2018    LIPID PROFILE  As directed 6/21/2018    TSH WITH REFLEX TO FT4  As directed 6/21/2018      Instructions    Mammogram, this will be for R breast follow up    Gabapentin has been refilled    Labs before your seem in in 6 months    Keep follow up appts with specialists           Other Notes About Your Plan     Last UDS: 6/17/15  Dr Colon  Contolled Substance agreement signed:  6/17/15 Dr Colon                 [employer] Access Code: Activation code not generated  Current [employer] Status: Active

## 2017-06-21 NOTE — PROGRESS NOTES
Chief Complaint   Patient presents with   • Establish Care     chronic UTI-fv urology, fibromyalgia         This is a 66 y.o.female patient that presents today with the following: Establish care with new PCP, discuss acute and chronic conditions, review labs    Closed fracture of head of right femur with routine healing  This occurred 6 weeks ago, she fell and hit her R hip on Easter Sunday and suffered complete fracture of the R femoral head. She didn't undergo ORIF of the right hip and is recovering without difficulty. She does have an upcoming appointment with orthopedic surgeon in 2 weeks. She reports that her surgical incision is healing well. She states that they checked her for osteoporosis and found that the bone scan was completely within normal limits. She also reports that she did not have to undergo any type of physical therapy.  She does wonder if her healing and her return to activity is slower than usual as she continues to have muscular pain along the lateral aspect of her right leg. She also states that since she had surgery, she has gone on vacation and has done a lot of walking and climbing up and down stairs. I did discuss with her that she is more active than the usual postoperative patient and that she needs to probably rest when her body tells her to rest. I encouraged her to discuss this with her surgeon at her follow-up appointment, sooner if needed.    Recurrent UTI (urinary tract infection)  The patient's last visit with me in March 2017 she was referred to urology for recurrent UTIs. She did undergo urodynamic testing and has been put on a daily antibiotic. We will request those outside records. She does have a follow-up appointment with that provider tomorrow.    Moderate episode of recurrent major depressive disorder (CMS-HCC)  This is chronic in nature, stable and well-controlled on current regimen including citalopram, clonazepam. She was on Ritalin, but this has been discontinued.  She is to continue care per specialist as scheduled.    Hypothyroidism  This is a chronic condition, stable and well-controlled on current regimen, 88 µg of levothyroxine. She tolerates this medication well with no significant bothersome side effects. Her labs are within normal limits. She does deny symptoms of hypothyroidism, she understands to take this medication on an empty stomach aside from her other medications. She will be due for labs again in 6 months before she follows up with me at that time.    Hypertension  This is a chronic condition, stable and well-controlled on current regimen. Her blood pressure is 108/74 and she denies symptoms of hypertension. She does not need refills at this time, but can call when needed. She tolerates her medications well with no significant bothersome side effects. She will be due for labs again before she follows up with me in 6 months, these have been ordered. She is to continue with healthy eating, regular physical activity and continued efforts towards maintaining healthy weight.    Fibromyalgia  This is a chronic condition, stable and well-controlled on current regimen including oxycodone and as needed Celebrex. She is followed by pain management through [employer] provider.    Dyslipidemia  This is a chronic condition, stable and well-controlled with diet and exercise as well as atorvastatin 40 mg daily. Her last lipid profile is as follows:  Component      Latest Ref Rng 2/14/2017           9:30 AM   Cholesterol,Tot      100 - 199 mg/dL 167   Triglycerides      0 - 149 mg/dL 97   HDL      >=40 mg/dL 52   LDL      <100 mg/dL 96   She does not need refills at this time, they can call when needed. She is to continue with healthy, low-fat, low-cholesterol diet, regular physical activity and continue efforts towards maintaining healthy weight.    Cyst of right breast  Patient has cysts of the right breast and is due for a diagnostic mammogram, this has been  ordered. She is encouraged to continue with monthly breast self exams.      No visits with results within 1 Month(s) from this visit.  Latest known visit with results is:    Admission on 2017, Discharged on 2017   Component Date Value   • Report 2017                      Value:[facility]    Test Date:  2017  Pt Name:    ZENAIDA BOB               Department: ER  MRN:        [id number]                      Room:       T317  Gender:     F                            Technician: [id number]  :        1950                   Requested By:NELDA BORDEN  Order #:    [id number]                    Reading MD: NELDA BORDEN MD    Measurements  Intervals                                Axis  Rate:       65                           P:          54  ID:         [id number]                          QRS:        45  QRSD:       80                           T:          41  QT:         384  QTc:        400    Interpretive Statements  SINUS RHYTHM  BORDERLINE AV CONDUCTION DELAY  No previous ECG available for comparison    Electronically Signed On 2017 9:25:25 PDT by NELDA BORDEN MD     • WBC 2017 9.6    • RBC 2017 4.01*   • Hemoglobin 2017 12.7    • Hematocrit 2017 38.1    • MCV 2017 95.0    • MCH 2017 31.7    • MCHC 2017 33.3*   • RDW 2017 41.4    • Platelet Count 2017 226    • MPV 2017 10.0    • Neutrophils-Polys 2017 74.30*   • Lymphocytes 2017 20.40*   • Monocytes 2017 3.80    • Eosinophils 2017 0.90    • Basophils 2017 0.20    • Immature Granulocytes 2017 0.40    • Nucleated RBC 2017 0.00    • Neutrophils (Absolute) 2017 7.12    • Lymphs (Absolute) 2017 1.95    • Monos (Absolute) 2017 0.36    • Eos (Absolute) 2017 0.09    • Baso (Absolute) 2017 0.02    • Immature Granulocytes (a* 2017 0.04    • NRBC (Absolute) 2017 0.00    • Sodium  04/16/2017 139    • Potassium 04/16/2017 3.7    • Chloride 04/16/2017 106    • Co2 04/16/2017 27    • Anion Gap 04/16/2017 6.0    • Glucose 04/16/2017 123*   • Bun 04/16/2017 20    • Creatinine 04/16/2017 0.99    • Calcium 04/16/2017 9.6    • AST(SGOT) 04/16/2017 14    • ALT(SGPT) 04/16/2017 16    • Alkaline Phosphatase 04/16/2017 55    • Total Bilirubin 04/16/2017 0.3    • Albumin 04/16/2017 4.4    • Total Protein 04/16/2017 6.9    • Globulin 04/16/2017 2.5    • A-G Ratio 04/16/2017 1.8    • PT 04/16/2017 12.7    • INR 04/16/2017 0.93    • APTT 04/16/2017 27.3    • GFR If  04/16/2017 >60    • GFR If Non  [employer]meri* 04/16/2017 56*   • Sodium 04/17/2017 138    • Potassium 04/17/2017 3.6    • Chloride 04/17/2017 107    • Co2 04/17/2017 24    • Glucose 04/17/2017 121*   • Bun 04/17/2017 15    • Creatinine 04/17/2017 0.87    • Calcium 04/17/2017 8.7    • Anion Gap 04/17/2017 7.0    • WBC 04/17/2017 7.5    • RBC 04/17/2017 3.64*   • Hemoglobin 04/17/2017 11.5*   • Hematocrit 04/17/2017 35.3*   • MCV 04/17/2017 97.0    • MCH 04/17/2017 31.6    • MCHC 04/17/2017 32.6*   • RDW 04/17/2017 42.9    • Platelet Count 04/17/2017 176    • MPV 04/17/2017 9.6    • GFR If  04/17/2017 >60    • GFR If Non  [employer]i* 04/17/2017 >60    • Glycohemoglobin 04/17/2017 5.4    • Est Avg Glucose 04/17/2017 108    • WBC 04/18/2017 6.2    • RBC 04/18/2017 3.15*   • Hemoglobin 04/18/2017 10.1*   • Hematocrit 04/18/2017 31.1*   • MCV 04/18/2017 98.7*   • MCH 04/18/2017 32.1    • MCHC 04/18/2017 32.5*   • RDW 04/18/2017 42.5    • Platelet Count 04/18/2017 140*   • MPV 04/18/2017 10.4    • Neutrophils-Polys 04/18/2017 71.80    • Lymphocytes 04/18/2017 22.00    • Monocytes 04/18/2017 5.20    • Eosinophils 04/18/2017 0.60    • Basophils 04/18/2017 0.20    • Immature Granulocytes 04/18/2017 0.20    • Nucleated RBC 04/18/2017 0.00    • Neutrophils (Absolute) 04/18/2017 4.45    • Lymphs (Absolute) 04/18/2017 1.36     • Monos (Absolute) 04/18/2017 0.32    • Eos (Absolute) 04/18/2017 0.04    • Baso (Absolute) 04/18/2017 0.01    • Immature Granulocytes (a* 04/18/2017 0.01    • NRBC (Absolute) 04/18/2017 0.00    • Sodium 04/18/2017 134*   • Potassium 04/18/2017 4.0    • Chloride 04/18/2017 103    • Co2 04/18/2017 26    • Glucose 04/18/2017 128*   • Creatinine 04/18/2017 0.99    • Bun 04/18/2017 16    • Calcium 04/18/2017 8.6    • Phosphorus 04/18/2017 2.9    • Albumin 04/18/2017 3.4    • GFR If  04/18/2017 >60    • GFR If Non  Ameri* 04/18/2017 56*   • WBC 04/19/2017 7.6    • RBC 04/19/2017 3.46*   • Hemoglobin 04/19/2017 11.0*   • Hematocrit 04/19/2017 33.2*   • MCV 04/19/2017 96.0    • MCH 04/19/2017 31.8    • MCHC 04/19/2017 33.1*   • RDW 04/19/2017 41.2    • Platelet Count 04/19/2017 192    • MPV 04/19/2017 9.8          clinical course has been stable    Past Medical History   Diagnosis Date   • Post concussive syndrome      after fall in 1999   • Depression      sees Psych in [place]   • PTSD (post-traumatic stress disorder)    • Anxiety    • Fibromyalgia    • Degenerative joint disease      bulging discs in C-spine, L-spine   • History of mammogram      9/11, diagnostic showed breast cyst, rec 1 year   • Skin cancer      SCC   • Pap test, as part of routine gynecological examination      last 2008, no abn history   • S/P colonoscopy      nl at age 52, due 2013   • Common bile duct (CBD) stricture      hx of in 1990s   • Hypertension    • Hyperlipidemia        Past Surgical History   Procedure Laterality Date   • Anna by laparoscopy     • Egd w/endoscopic ultrasound       1990s, common bile duct stricture, no stent   • Hip cannulated screw Right 4/17/2017     Procedure: HIP CANNULATED SCREW;  Surgeon: Dhaval Poole M.D.;  Location: SURGERY [facility];  Service:        Family History   Problem Relation Age of Onset   • Cancer Mother        Ampicillin and Latex    Current Outpatient Prescriptions  Ordered in [facility]   Medication Sig Dispense Refill   • oxycodone immediate-release (ROXICODONE) 5 MG Tab Take 5 mg by mouth every four hours as needed for Severe Pain.     • fluticasone (FLONASE) 50 MCG/ACT nasal spray Spray 1 Spray in nose every day.     • gabapentin (NEURONTIN) 800 MG tablet Take 1 Tab by mouth 3 times a day. 270 Tab 1   • citalopram (CELEXA) 20 MG Tab Take 20 mg by mouth every day.     • clonazepam (KLONOPIN) 1 MG Tab Take 0.5-1 mg by mouth 3 times a day. 0.5mg in am, 0.5mg 1400, 1mg at bedtime.     • lisinopril-hydrochlorothiazide (PRINZIDE, ZESTORETIC) 20-12.5 MG per tablet TAKE 1 TABLET DAILY 90 Tab 3   • levothyroxine (SYNTHROID) 88 MCG Tab TAKE ONE TABLET BY MOUTH ONCE DAILY 90 Tab 3   • atorvastatin (LIPITOR) 40 MG Tab Take 1 Tab by mouth every day. 90 Tab 3   • omeprazole (PRILOSEC) 20 MG delayed-release capsule Take 20 mg by mouth every day.     • topiramate (TOPAMAX) 100 MG Tab Take 1 Tab by mouth 2 times a day. 60 Tab 4   • trazodone (DESYREL) 50 MG TABS Take 50 mg by mouth every evening.     • celecoxib (CELEBREX) 100 MG Cap Take 1 Cap by mouth every day. 30 Cap 3   • CALCIUM-VITAMIN D PO Take  by mouth.       No current Epic-ordered facility-administered medications on file.       Constitutional ROS: No unexpected change in weight, No weakness, No unexplained fevers, sweats, or chills  Pulmonary ROS: No chronic cough, sputum, or hemoptysis, No shortness of breath, No recent change in breathing  Cardiovascular ROS: No chest pain, No edema, No palpitations, Positive for hypertension, controlled  Gastrointestinal ROS: No abdominal pain, No nausea, vomiting, diarrhea, or constipation, no blood in stool  Breast ROS: Positive per history of present illness  Musculoskeletal ROS: Positive per history of present illness   Neurologic ROS: Normal development, No seizures, No weakness  Psychiatric ROS: Positive for depression, per history of present illness  Endocrine ROS: Positive per history of  "present illness  Genitourinary ROS: Positive per history of present illness    Physical exam:  /74 mmHg  Pulse 68  Temp(Src) 36.3 °C (97.3 °F)  Resp 16  Ht 1.626 m (5' 4.02\")  Wt 66.679 kg (147 lb)  BMI 25.22 kg/m2  SpO2 97%  General Appearance: Middle-aged older female, alert, no distress, well-nourished, well-groomed  Skin: Skin color, texture, turgor normal. No rashes or lesions.  Lungs: negative findings: normal respiratory rate and rhythm, lungs clear to auscultation  Heart: negative. RRR without murmur, gallop, or rubs.  No ectopy.  Abdomen: Abdomen soft, non-tender. BS normal. No masses,  No organomegaly  Musculoskeletal: negative findings: ROM of all joints is normal, no evidence of joint instability, strength normal, no deformities present  Neurologic: intact, oriented, mood appropriate, judgment intact. Cranial nerves II-12 grossly intact    Medical decision making/discussion: Patient here to establish care with new PCP and discuss her acute and chronic conditions. Medications were refilled, she stopped these as prescribed. She is to follow up with me in 6 months with labs done before her visit. She is to continue care for her specialists including orthopedics and urology as well as psychiatry. Diagnostic follow-up mammogram was ordered. We will request her outside records from orthopedics as well as urology. She is to continue with healthy eating, regular physical activity and continue efforts towards maintaining her healthy weight.    Elizabeth was seen today for establish care.    Diagnoses and all orders for this visit:    Moderate episode of recurrent major depressive disorder (CMS-HCC)    Closed fracture of head of right femur with routine healing    Hypothyroidism, unspecified type  -     TSH WITH REFLEX TO FT4; Future    Essential hypertension  -     COMP METABOLIC PANEL; Future  -     LIPID PROFILE; Future    Dyslipidemia  -     COMP METABOLIC PANEL; Future  -     LIPID PROFILE; " Future    Fibromyalgia  -     gabapentin (NEURONTIN) 800 MG tablet; Take 1 Tab by mouth 3 times a day.    Recurrent UTI (urinary tract infection)    Cyst of right breast  -     MA-UNILAT DIAGNOSTIC MAMMO W/ CAD RIGHT; Future    Screening for breast cancer    Other orders  -     Obtain Results:: Other (see comment); Obtain Results From:: Other (see comment)          Please note that this dictation was created using voice recognition software. I have made every reasonable attempt to correct obvious errors, but I expect that there are errors of grammar and possibly content that I did not discover before finalizing the note.

## 2017-06-21 NOTE — ASSESSMENT & PLAN NOTE
This is chronic in nature, stable and well-controlled on current regimen including citalopram, clonazepam. She was on Ritalin, but this has been discontinued. She is to continue care per specialist as scheduled.

## 2017-06-21 NOTE — PATIENT INSTRUCTIONS
Mammogram, this will be for R breast follow up    Gabapentin has been refilled    Labs before your seem in in 6 months    Keep follow up appts with specialists

## 2017-06-21 NOTE — ASSESSMENT & PLAN NOTE
This is a chronic condition, stable and well-controlled with diet and exercise as well as atorvastatin 40 mg daily. Her last lipid profile is as follows:  Component      Latest Ref Rng 2/14/2017           9:30 AM   Cholesterol,Tot      100 - 199 mg/dL 167   Triglycerides      0 - 149 mg/dL 97   HDL      >=40 mg/dL 52   LDL      <100 mg/dL 96   She does not need refills at this time, they can call when needed. She is to continue with healthy, low-fat, low-cholesterol diet, regular physical activity and continue efforts towards maintaining healthy weight.

## 2017-06-29 ENCOUNTER — TELEPHONE (OUTPATIENT)
Dept: MEDICAL GROUP | Facility: PHYSICIAN GROUP | Age: 67
End: 2017-06-29

## 2017-06-30 NOTE — TELEPHONE ENCOUNTER
Records request from [facility] regarding last visit note. Notes faxed and request scanned in media

## 2017-10-28 DIAGNOSIS — E78.00 PURE HYPERCHOLESTEROLEMIA: ICD-10-CM

## 2017-10-28 DIAGNOSIS — M54.16 LUMBAR RADICULOPATHY: ICD-10-CM

## 2017-10-31 RX ORDER — ATORVASTATIN CALCIUM 40 MG/1
TABLET, FILM COATED ORAL
Qty: 90 TAB | Refills: 0 | Status: SHIPPED | OUTPATIENT
Start: 2017-10-31 | End: 2018-02-01 | Stop reason: SDUPTHER

## 2017-10-31 RX ORDER — CELECOXIB 100 MG/1
CAPSULE ORAL
Qty: 90 CAP | Refills: 0 | Status: SHIPPED | OUTPATIENT
Start: 2017-10-31 | End: 2018-07-12

## 2017-12-27 ENCOUNTER — HOSPITAL ENCOUNTER (OUTPATIENT)
Dept: LAB | Facility: MEDICAL CENTER | Age: 67
End: 2017-12-27
Attending: NURSE PRACTITIONER
Payer: MEDICARE

## 2017-12-27 ENCOUNTER — HOSPITAL ENCOUNTER (OUTPATIENT)
Dept: LAB | Facility: MEDICAL CENTER | Age: 67
End: 2017-12-27
Attending: PHYSICIAN ASSISTANT
Payer: MEDICARE

## 2017-12-27 DIAGNOSIS — E78.5 DYSLIPIDEMIA: ICD-10-CM

## 2017-12-27 DIAGNOSIS — E03.9 HYPOTHYROIDISM, UNSPECIFIED TYPE: ICD-10-CM

## 2017-12-27 DIAGNOSIS — I10 ESSENTIAL HYPERTENSION: ICD-10-CM

## 2017-12-27 LAB
25(OH)D3 SERPL-MCNC: 40 NG/ML (ref 30–100)
ALBUMIN SERPL BCP-MCNC: 4.3 G/DL (ref 3.2–4.9)
ALBUMIN SERPL BCP-MCNC: 4.5 G/DL (ref 3.2–4.9)
ALBUMIN/GLOB SERPL: 1.7 G/DL
ALBUMIN/GLOB SERPL: 1.7 G/DL
ALP SERPL-CCNC: 56 U/L (ref 30–99)
ALP SERPL-CCNC: 61 U/L (ref 30–99)
ALT SERPL-CCNC: 15 U/L (ref 2–50)
ALT SERPL-CCNC: 15 U/L (ref 2–50)
ANION GAP SERPL CALC-SCNC: 5 MMOL/L (ref 0–11.9)
ANION GAP SERPL CALC-SCNC: 7 MMOL/L (ref 0–11.9)
AST SERPL-CCNC: 13 U/L (ref 12–45)
AST SERPL-CCNC: 17 U/L (ref 12–45)
BASOPHILS # BLD AUTO: 0.8 % (ref 0–1.8)
BASOPHILS # BLD: 0.04 K/UL (ref 0–0.12)
BILIRUB SERPL-MCNC: 0.4 MG/DL (ref 0.1–1.5)
BILIRUB SERPL-MCNC: 0.4 MG/DL (ref 0.1–1.5)
BUN SERPL-MCNC: 24 MG/DL (ref 8–22)
BUN SERPL-MCNC: 25 MG/DL (ref 8–22)
CA-I SERPL-SCNC: 1.3 MMOL/L (ref 1.1–1.3)
CALCIUM SERPL-MCNC: 10 MG/DL (ref 8.5–10.5)
CALCIUM SERPL-MCNC: 10.2 MG/DL (ref 8.5–10.5)
CHLORIDE SERPL-SCNC: 107 MMOL/L (ref 96–112)
CHLORIDE SERPL-SCNC: 108 MMOL/L (ref 96–112)
CHOLEST SERPL-MCNC: 197 MG/DL (ref 100–199)
CO2 SERPL-SCNC: 29 MMOL/L (ref 20–33)
CO2 SERPL-SCNC: 29 MMOL/L (ref 20–33)
CREAT SERPL-MCNC: 1.29 MG/DL (ref 0.5–1.4)
CREAT SERPL-MCNC: 1.37 MG/DL (ref 0.5–1.4)
EOSINOPHIL # BLD AUTO: 0.28 K/UL (ref 0–0.51)
EOSINOPHIL NFR BLD: 5.7 % (ref 0–6.9)
ERYTHROCYTE [DISTWIDTH] IN BLOOD BY AUTOMATED COUNT: 44.5 FL (ref 35.9–50)
GFR SERPL CREATININE-BSD FRML MDRD: 39 ML/MIN/1.73 M 2
GFR SERPL CREATININE-BSD FRML MDRD: 41 ML/MIN/1.73 M 2
GLOBULIN SER CALC-MCNC: 2.5 G/DL (ref 1.9–3.5)
GLOBULIN SER CALC-MCNC: 2.6 G/DL (ref 1.9–3.5)
GLUCOSE SERPL-MCNC: 92 MG/DL (ref 65–99)
GLUCOSE SERPL-MCNC: 94 MG/DL (ref 65–99)
HCT VFR BLD AUTO: 40 % (ref 37–47)
HDLC SERPL-MCNC: 71 MG/DL
HGB BLD-MCNC: 13 G/DL (ref 12–16)
IMM GRANULOCYTES # BLD AUTO: 0.01 K/UL (ref 0–0.11)
IMM GRANULOCYTES NFR BLD AUTO: 0.2 % (ref 0–0.9)
LDLC SERPL CALC-MCNC: 111 MG/DL
LYMPHOCYTES # BLD AUTO: 1.79 K/UL (ref 1–4.8)
LYMPHOCYTES NFR BLD: 36.7 % (ref 22–41)
MCH RBC QN AUTO: 32.2 PG (ref 27–33)
MCHC RBC AUTO-ENTMCNC: 32.5 G/DL (ref 33.6–35)
MCV RBC AUTO: 99 FL (ref 81.4–97.8)
MONOCYTES # BLD AUTO: 0.32 K/UL (ref 0–0.85)
MONOCYTES NFR BLD AUTO: 6.6 % (ref 0–13.4)
NEUTROPHILS # BLD AUTO: 2.44 K/UL (ref 2–7.15)
NEUTROPHILS NFR BLD: 50 % (ref 44–72)
NRBC # BLD AUTO: 0 K/UL
NRBC BLD-RTO: 0 /100 WBC
PLATELET # BLD AUTO: 243 K/UL (ref 164–446)
PMV BLD AUTO: 10 FL (ref 9–12.9)
POTASSIUM SERPL-SCNC: 4.2 MMOL/L (ref 3.6–5.5)
POTASSIUM SERPL-SCNC: 4.2 MMOL/L (ref 3.6–5.5)
PROT SERPL-MCNC: 6.8 G/DL (ref 6–8.2)
PROT SERPL-MCNC: 7.1 G/DL (ref 6–8.2)
PTH-INTACT SERPL-MCNC: 12.7 PG/ML (ref 14–72)
RBC # BLD AUTO: 4.04 M/UL (ref 4.2–5.4)
SODIUM SERPL-SCNC: 142 MMOL/L (ref 135–145)
SODIUM SERPL-SCNC: 143 MMOL/L (ref 135–145)
TRIGL SERPL-MCNC: 76 MG/DL (ref 0–149)
TSH SERPL DL<=0.005 MIU/L-ACNC: 0.88 UIU/ML (ref 0.38–5.33)
WBC # BLD AUTO: 4.9 K/UL (ref 4.8–10.8)

## 2017-12-27 PROCEDURE — 80053 COMPREHEN METABOLIC PANEL: CPT

## 2017-12-27 PROCEDURE — 85025 COMPLETE CBC W/AUTO DIFF WBC: CPT

## 2017-12-27 PROCEDURE — 84443 ASSAY THYROID STIM HORMONE: CPT

## 2017-12-27 PROCEDURE — 83970 ASSAY OF PARATHORMONE: CPT

## 2017-12-27 PROCEDURE — 80053 COMPREHEN METABOLIC PANEL: CPT | Mod: 91

## 2017-12-27 PROCEDURE — 82330 ASSAY OF CALCIUM: CPT

## 2017-12-27 PROCEDURE — 80061 LIPID PANEL: CPT

## 2017-12-27 PROCEDURE — 82306 VITAMIN D 25 HYDROXY: CPT

## 2017-12-27 PROCEDURE — 36415 COLL VENOUS BLD VENIPUNCTURE: CPT

## 2017-12-28 ENCOUNTER — OFFICE VISIT (OUTPATIENT)
Dept: MEDICAL GROUP | Facility: PHYSICIAN GROUP | Age: 67
End: 2017-12-28
Payer: MEDICARE

## 2017-12-28 ENCOUNTER — TELEPHONE (OUTPATIENT)
Dept: MEDICAL GROUP | Facility: PHYSICIAN GROUP | Age: 67
End: 2017-12-28

## 2017-12-28 ENCOUNTER — HOSPITAL ENCOUNTER (OUTPATIENT)
Dept: LAB | Facility: MEDICAL CENTER | Age: 67
End: 2017-12-28
Attending: NURSE PRACTITIONER
Payer: MEDICARE

## 2017-12-28 VITALS
HEIGHT: 64 IN | WEIGHT: 156 LBS | BODY MASS INDEX: 26.63 KG/M2 | RESPIRATION RATE: 16 BRPM | HEART RATE: 74 BPM | SYSTOLIC BLOOD PRESSURE: 112 MMHG | TEMPERATURE: 98.3 F | DIASTOLIC BLOOD PRESSURE: 70 MMHG | OXYGEN SATURATION: 96 %

## 2017-12-28 DIAGNOSIS — E03.9 HYPOTHYROIDISM, UNSPECIFIED TYPE: ICD-10-CM

## 2017-12-28 DIAGNOSIS — R92.1 BREAST CALCIFICATION, RIGHT: ICD-10-CM

## 2017-12-28 DIAGNOSIS — M54.16 LUMBAR RADICULOPATHY: ICD-10-CM

## 2017-12-28 DIAGNOSIS — Z23 NEED FOR PNEUMOCOCCAL VACCINATION: ICD-10-CM

## 2017-12-28 DIAGNOSIS — Z23 NEED FOR SHINGLES VACCINE: ICD-10-CM

## 2017-12-28 DIAGNOSIS — R21 RASH: ICD-10-CM

## 2017-12-28 DIAGNOSIS — I10 ESSENTIAL HYPERTENSION: ICD-10-CM

## 2017-12-28 DIAGNOSIS — Z23 NEED FOR TDAP VACCINATION: ICD-10-CM

## 2017-12-28 DIAGNOSIS — Z23 NEED FOR INFLUENZA VACCINATION: ICD-10-CM

## 2017-12-28 DIAGNOSIS — F33.1 MODERATE EPISODE OF RECURRENT MAJOR DEPRESSIVE DISORDER (HCC): ICD-10-CM

## 2017-12-28 DIAGNOSIS — N18.30 CHRONIC KIDNEY DISEASE, STAGE 3 (HCC): ICD-10-CM

## 2017-12-28 LAB — TSH SERPL DL<=0.005 MIU/L-ACNC: 0.7 UIU/ML (ref 0.38–5.33)

## 2017-12-28 PROCEDURE — 84443 ASSAY THYROID STIM HORMONE: CPT

## 2017-12-28 PROCEDURE — 90715 TDAP VACCINE 7 YRS/> IM: CPT | Performed by: NURSE PRACTITIONER

## 2017-12-28 PROCEDURE — 90472 IMMUNIZATION ADMIN EACH ADD: CPT | Performed by: NURSE PRACTITIONER

## 2017-12-28 PROCEDURE — 99214 OFFICE O/P EST MOD 30 MIN: CPT | Mod: 25 | Performed by: NURSE PRACTITIONER

## 2017-12-28 PROCEDURE — G0008 ADMIN INFLUENZA VIRUS VAC: HCPCS | Performed by: NURSE PRACTITIONER

## 2017-12-28 PROCEDURE — 90670 PCV13 VACCINE IM: CPT | Performed by: NURSE PRACTITIONER

## 2017-12-28 PROCEDURE — 36415 COLL VENOUS BLD VENIPUNCTURE: CPT

## 2017-12-28 PROCEDURE — 90662 IIV NO PRSV INCREASED AG IM: CPT | Performed by: NURSE PRACTITIONER

## 2017-12-28 PROCEDURE — G0009 ADMIN PNEUMOCOCCAL VACCINE: HCPCS | Performed by: NURSE PRACTITIONER

## 2017-12-28 RX ORDER — LISINOPRIL AND HYDROCHLOROTHIAZIDE 20; 12.5 MG/1; MG/1
TABLET ORAL
Qty: 90 TAB | Refills: 3 | Status: SHIPPED | OUTPATIENT
Start: 2017-12-28 | End: 2018-12-29 | Stop reason: SDUPTHER

## 2017-12-28 RX ORDER — COVID-19 ANTIGEN TEST
KIT MISCELLANEOUS
COMMUNITY
End: 2018-05-22

## 2017-12-28 RX ORDER — BUPROPION HCL 100 MG
100 TABLET,SUSTAINED-RELEASE 12 HR ORAL 2 TIMES DAILY
COMMUNITY
End: 2019-01-14

## 2017-12-28 RX ORDER — PREDNISONE 20 MG/1
TABLET ORAL
Qty: 10 TAB | Refills: 0 | Status: SHIPPED | OUTPATIENT
Start: 2017-12-28 | End: 2018-07-12

## 2017-12-28 NOTE — TELEPHONE ENCOUNTER
Was the patient seen in the last year in this department? Yes     Does patient have an active prescription for medications requested? Yes     Received Request Via: Patient       Patient previously received this Rx from [facility]. She would now like this to be sent to [facility].

## 2017-12-28 NOTE — PATIENT INSTRUCTIONS
Diagnostic mammogram of R breast     Lab anytime today if you want    Follow up with me in 6 months, sooner if needed    Stop aleve, start Tylenol Arthritis    Immunizations==flu, pneumonia, tdap    Shingles Rx

## 2017-12-29 NOTE — ASSESSMENT & PLAN NOTE
After office visit, patient went to lab to have TSH drawn, which she pulled the sleep she noticed she had eruptive, nonpruritic rash over the entirety of her bilateral upper extremities, chest, abdomen and upper back. She has a few scattered rash on bilateral lower extremities. She states that it does not itch, she is not having difficulty breathing. She denies any new health care products including soaps, shampoos or detergents, medications other than Aleve, new foods or travel. She did have 3 immunizations today including DTaP, influenza and Prevnar 23. She states that she has taken Aleve in the past and did not have a reaction like this. I did discuss with her that should she develop difficulty breathing or any other systemic symptoms I would like her to go to the emergency room right away. In the interim I'm going to have her take as short steroid burst, prednisone 40 mg daily for 5 days.

## 2017-12-29 NOTE — ASSESSMENT & PLAN NOTE
This is a chronic condition, stable and fairly well-controlled with medications including citalopram and Wellbutrin. She is followed by psychiatry who manages her medications.

## 2017-12-29 NOTE — ASSESSMENT & PLAN NOTE
This is a chronic condition, stable and usually well controlled on levothyroxine 88 µg daily. She tolerates this medication well with no significant bothersome side effects. She is due for labs, these have been ordered. She does report symptoms of hypothyroidism--she has having increased intolerance to cold and feeling a bit sluggish.

## 2017-12-29 NOTE — ASSESSMENT & PLAN NOTE
Patient does have history of right breast calcifications with follow-up surveillance mammograms stable. She is due again for follow-up diagnostic right breast mammogram, this has been ordered.

## 2017-12-29 NOTE — PROGRESS NOTES
Chief Complaint   Patient presents with   • Hyperlipidemia     fv labs         This is a 67 y.o.female patient that presents today with the following:Follow-up visit    Hypothyroidism  This is a chronic condition, stable and usually well controlled on levothyroxine 88 µg daily. She tolerates this medication well with no significant bothersome side effects. She is due for labs, these have been ordered. She does report symptoms of hypothyroidism--she has having increased intolerance to cold and feeling a bit sluggish.    Chronic kidney disease, stage 3  This is a chronic condition, labs have been stable. Her most recent GFR was the following:  Component      Latest Ref Rng & Units 4/16/2017 4/17/2017 4/18/2017 12/27/2017           7:25 PM  1:53 AM  3:34 AM 10:21 AM   GFR If African American      >60 mL/min/1.73 m 2 >60 >60 >60 50 (A)   GFR If Non African American      >60 mL/min/1.73 m 2 56 (A) >60 56 (A) 41 (A)   She was reminded to avoid nephrotoxic medications, stay well-hydrated and worked to keep blood pressure under control. She does admit to using Aleve recently for pain associated with fibromyalgia and her hip. I did advise her to switch to Tylenol arthritis due to the effects of NSAIDs on the kidneys. She states she will stop the Aleve and start Tylenol arthritis.    Moderate episode of recurrent major depressive disorder (CMS-HCC)  This is a chronic condition, stable and fairly well-controlled with medications including citalopram and Wellbutrin. She is followed by psychiatry who manages her medications.    Lumbar radiculopathy  This is a chronic condition, stable and fairly well-controlled with medications. She is closely followed by [facility] provider's. She does report to me that they are concerned with decrease in height at a faster than usual rate so they have ordered extensive workup. She will be getting bone scan soon. She did have recent lab work done and which PTH was mildly decreased. She does  have upcoming appointment next week with provider.    Rash  After office visit, patient went to lab to have TSH drawn, which she pulled the sleep she noticed she had eruptive, nonpruritic rash over the entirety of her bilateral upper extremities, chest, abdomen and upper back. She has a few scattered rash on bilateral lower extremities. She states that it does not itch, she is not having difficulty breathing. She denies any new health care products including soaps, shampoos or detergents, medications other than Aleve, new foods or travel. She did have 3 immunizations today including DTaP, influenza and Prevnar 23. She states that she has taken Aleve in the past and did not have a reaction like this. I did discuss with her that should she develop difficulty breathing or any other systemic symptoms I would like her to go to the emergency room right away. In the interim I'm going to have her take as short steroid burst, prednisone 40 mg daily for 5 days.    Breast calcification, right  Patient does have history of right breast calcifications with follow-up surveillance mammograms stable. She is due again for follow-up diagnostic right breast mammogram, this has been ordered.      Hospital Outpatient Visit on 12/27/2017   Component Date Value   • Sodium 12/27/2017 143    • Potassium 12/27/2017 4.2    • Chloride 12/27/2017 107    • Co2 12/27/2017 29    • Anion Gap 12/27/2017 7.0    • Glucose 12/27/2017 92    • Bun 12/27/2017 24*   • Creatinine 12/27/2017 1.29    • Calcium 12/27/2017 10.0    • AST(SGOT) 12/27/2017 13    • ALT(SGPT) 12/27/2017 15    • Alkaline Phosphatase 12/27/2017 61    • Total Bilirubin 12/27/2017 0.4    • Albumin 12/27/2017 4.3    • Total Protein 12/27/2017 6.8    • Globulin 12/27/2017 2.5    • A-G Ratio 12/27/2017 1.7    • Cholesterol,Tot 12/27/2017 197    • Triglycerides 12/27/2017 76    • HDL 12/27/2017 71    • LDL 12/27/2017 111*   • TSH 12/27/2017 0.880    • GFR If  12/27/2017 50*    • GFR If Non  Ameri* 12/27/2017 41*   Hospital Outpatient Visit on 12/27/2017   Component Date Value   • Ionized Calcium 12/27/2017 1.3    • 25-Hydroxy   Vitamin D 25 12/27/2017 40    • WBC 12/27/2017 4.9    • RBC 12/27/2017 4.04*   • Hemoglobin 12/27/2017 13.0    • Hematocrit 12/27/2017 40.0    • MCV 12/27/2017 99.0*   • MCH 12/27/2017 32.2    • MCHC 12/27/2017 32.5*   • RDW 12/27/2017 44.5    • Platelet Count 12/27/2017 243    • MPV 12/27/2017 10.0    • Neutrophils-Polys 12/27/2017 50.00    • Lymphocytes 12/27/2017 36.70    • Monocytes 12/27/2017 6.60    • Eosinophils 12/27/2017 5.70    • Basophils 12/27/2017 0.80    • Immature Granulocytes 12/27/2017 0.20    • Nucleated RBC 12/27/2017 0.00    • Neutrophils (Absolute) 12/27/2017 2.44    • Lymphs (Absolute) 12/27/2017 1.79    • Monos (Absolute) 12/27/2017 0.32    • Eos (Absolute) 12/27/2017 0.28    • Baso (Absolute) 12/27/2017 0.04    • Immature Granulocytes (a* 12/27/2017 0.01    • NRBC (Absolute) 12/27/2017 0.00    • Pth, Intact 12/27/2017 12.7*   • GFR If  12/27/2017 47*   • GFR If Non  Ameri* 12/27/2017 39*   • Sodium 12/27/2017 142    • Potassium 12/27/2017 4.2    • Chloride 12/27/2017 108    • Co2 12/27/2017 29    • Anion Gap 12/27/2017 5.0    • Glucose 12/27/2017 94    • Bun 12/27/2017 25*   • Creatinine 12/27/2017 1.37    • Calcium 12/27/2017 10.2    • AST(SGOT) 12/27/2017 17    • ALT(SGPT) 12/27/2017 15    • Alkaline Phosphatase 12/27/2017 56    • Total Bilirubin 12/27/2017 0.4    • Albumin 12/27/2017 4.5    • Total Protein 12/27/2017 7.1    • Globulin 12/27/2017 2.6    • A-G Ratio 12/27/2017 1.7          clinical course has been stable    Past Medical History:   Diagnosis Date   • Anxiety    • Common bile duct (CBD) stricture     hx of in 1990s   • Degenerative joint disease     bulging discs in C-spine, L-spine   • Depression     sees Psych in [place]   • Fibromyalgia    • History of mammogram     9/11, diagnostic showed  breast cyst, rec 1 year   • Hyperlipidemia    • Hypertension    • Pap test, as part of routine gynecological examination     last 2008, no abn history   • Post concussive syndrome     after fall in 1999   • PTSD (post-traumatic stress disorder)    • S/P colonoscopy     nl at age 52, due 2013   • Skin cancer     SCC       Past Surgical History:   Procedure Laterality Date   • HIP CANNULATED SCREW Right 4/17/2017    Procedure: HIP CANNULATED SCREW;  Surgeon: Dhaval Poole M.D.;  Location: SURGERY [facility];  Service:    • ESTEFANÍA BY LAPAROSCOPY     • EGD W/ENDOSCOPIC ULTRASOUND      1990s, common bile duct stricture, no stent       Family History   Problem Relation Age of Onset   • Cancer Mother        Ampicillin and Latex    Current Outpatient Prescriptions Ordered in [facility]   Medication Sig Dispense Refill   • buPROPion SR (WELLBUTRIN SR) 100 MG TABLET SR 12 HR Take 100 mg by mouth every day.     • Naproxen Sodium (ALEVE) 220 MG Cap Take  by mouth.     • magnesium chloride SR (SLO-MAG) 535 (64 Mg) MG Tab CR Take 535 mg by mouth every day.     • NON SPECIFIED Please administer Zostivax immunization 1 Each 0   • predniSONE (DELTASONE) 20 MG Tab Take 2 tablets daily for 5 days--take in am 10 Tab 0   • celecoxib (CELEBREX) 100 MG Cap TAKE ONE CAPSULE BY MOUTH ONCE DAILY 90 Cap 0   • atorvastatin (LIPITOR) 40 MG Tab TAKE ONE TABLET BY MOUTH ONCE DAILY 90 Tab 0   • fluticasone (FLONASE) 50 MCG/ACT nasal spray Spray 1 Spray in nose every day.     • gabapentin (NEURONTIN) 800 MG tablet Take 1 Tab by mouth 3 times a day. 270 Tab 1   • citalopram (CELEXA) 20 MG Tab Take 20 mg by mouth every day.     • clonazepam (KLONOPIN) 1 MG Tab Take 0.5-1 mg by mouth 3 times a day. 0.5mg in am, 0.5mg 1400, 1mg at bedtime.     • levothyroxine (SYNTHROID) 88 MCG Tab TAKE ONE TABLET BY MOUTH ONCE DAILY 90 Tab 3   • omeprazole (PRILOSEC) 20 MG delayed-release capsule Take 20 mg by mouth every day.     • topiramate (TOPAMAX) 100 MG Tab  "Take 1 Tab by mouth 2 times a day. 60 Tab 4   • CALCIUM-VITAMIN D PO Take  by mouth.     • trazodone (DESYREL) 50 MG TABS Take 100 mg by mouth every evening.     • lisinopril-hydrochlorothiazide (PRINZIDE, ZESTORETIC) 20-12.5 MG per tablet TAKE 1 TABLET DAILY 90 Tab 3     No current Epic-ordered facility-administered medications on file.        Constitutional ROS: No unexpected change in weight, No weakness, No unexplained fevers, sweats, or chills  Neck ROS: No lumps or masses, No recent swelling in thyroid area, No significant pain in neck  Pulmonary ROS: No chronic cough, sputum, or hemoptysis, No shortness of breath, No recent change in breathing  Cardiovascular ROS: No chest pain, No edema, No palpitations  Gastrointestinal ROS: No abdominal pain, No nausea, vomiting, diarrhea, or constipation, no blood in stool  Musculoskeletal/Extremities ROS: No clubbing, No peripheral edema, No pain, redness or swelling on the joints  Skin/Integumentary ROS: Positive for rash, per history of present illness  Neurologic ROS: Normal development, No seizures, No weakness  Endocrine ROS: Positive per history of present illness    Physical exam:  /70   Pulse 74   Temp 36.8 °C (98.3 °F)   Resp 16   Ht 1.626 m (5' 4\")   Wt 70.8 kg (156 lb)   SpO2 96%   BMI 26.78 kg/m²   General Appearance: Middle-aged older female, alert, no distress, moderately overweight, well groomed  Skin: positives: maculopapular eruption - generalized, nonpruritic  Lungs: negative findings: normal respiratory rate and rhythm, lungs clear to auscultation  Heart: negative. RRR without murmur, gallop, or rubs.  No ectopy.  Abdomen: Abdomen soft, non-tender. BS normal. No masses,  No organomegaly  Musculoskeletal: negative findings: no evidence of joint instability, strength normal, no deformities present  Neurologic: intact, oriented, mood appropriate, judgment intact. Cranial nerves II-12 grossly intact    Medical decision making/discussion: Will " update immunizations today including influenza, pneumonia and TDaP. I have encouraged her to stop the Aleve and take Tylenol arthritis instead in light of her chronic kidney disease. Diagnostic right breast mammogram has been ordered. I would like her to follow up with me in 6 months, sooner if needed. She has been given prescription for shingles immunization.      .Elizabeth was seen today for hyperlipidemia.    Diagnoses and all orders for this visit:    Hypothyroidism, unspecified type  -     TSH WITH REFLEX TO FT4; Future    Breast calcification, right  -     MA-MAMMO DIAGNOSTIC UNILAT W/MILDRED W/CAD RIGHT; Future    Rash  -     predniSONE (DELTASONE) 20 MG Tab; Take 2 tablets daily for 5 days--take in am    Moderate episode of recurrent major depressive disorder (CMS-HCC)    Lumbar radiculopathy    Chronic kidney disease, stage 3    Need for influenza vaccination  -     INFLUENZA VACCINE, HIGH DOSE (65+ ONLY)    Need for Tdap vaccination  -     TDAP VACCINE =>8YO IM    Need for pneumococcal vaccination  -     Prevnar 13 PCV-13    Need for shingles vaccine  -     NON SPECIFIED; Please administer Zostivax immunization          Please note that this dictation was created using voice recognition software. I have made every reasonable attempt to correct obvious errors, but I expect that there are errors of grammar and possibly content that I did not discover before finalizing the note.

## 2017-12-29 NOTE — ASSESSMENT & PLAN NOTE
This is a chronic condition, stable and fairly well-controlled with medications. She is closely followed by [facility] provider's. She does report to me that they are concerned with decrease in height at a faster than usual rate so they have ordered extensive workup. She will be getting bone scan soon. She did have recent lab work done and which PTH was mildly decreased. She does have upcoming appointment next week with provider.

## 2017-12-29 NOTE — ASSESSMENT & PLAN NOTE
This is a chronic condition, labs have been stable. Her most recent GFR was the following:  Component      Latest Ref Rng & Units 4/16/2017 4/17/2017 4/18/2017 12/27/2017           7:25 PM  1:53 AM  3:34 AM 10:21 AM   GFR If African American      >60 mL/min/1.73 m 2 >60 >60 >60 50 (A)   GFR If Non African American      >60 mL/min/1.73 m 2 56 (A) >60 56 (A) 41 (A)   She was reminded to avoid nephrotoxic medications, stay well-hydrated and worked to keep blood pressure under control. She does admit to using Aleve recently for pain associated with fibromyalgia and her hip. I did advise her to switch to Tylenol arthritis due to the effects of NSAIDs on the kidneys. She states she will stop the Aleve and start Tylenol arthritis.

## 2017-12-31 DIAGNOSIS — R73.01 ELEVATED FASTING GLUCOSE: ICD-10-CM

## 2017-12-31 DIAGNOSIS — E78.5 DYSLIPIDEMIA: ICD-10-CM

## 2018-01-08 ENCOUNTER — TELEPHONE (OUTPATIENT)
Dept: MEDICAL GROUP | Facility: PHYSICIAN GROUP | Age: 68
End: 2018-01-08

## 2018-01-08 NOTE — TELEPHONE ENCOUNTER
I sent the below info to patient via a [employer] message a week ago--pt still has not read message. Please call pt with message. Thank you.

## 2018-01-08 NOTE — TELEPHONE ENCOUNTER
----- Message from Elizabeth Murphy sent at 1/8/2018  5:00 AM PST -----  Regarding: Lab results  Elizabeth,  Your thyroid labs are normal, I will now put in lab orders for you to have done before you see me in July of 2018.  Elizabeth

## 2018-01-18 ENCOUNTER — HOSPITAL ENCOUNTER (OUTPATIENT)
Dept: RADIOLOGY | Facility: MEDICAL CENTER | Age: 68
End: 2018-01-18
Attending: PHYSICIAN ASSISTANT
Payer: MEDICARE

## 2018-01-18 DIAGNOSIS — M80.051S AGE-RELATED OSTEOPOROSIS WITH CURRENT PATHOLOGICAL FRACTURE OF RIGHT FEMUR, SEQUELA: ICD-10-CM

## 2018-01-18 PROCEDURE — 77080 DXA BONE DENSITY AXIAL: CPT

## 2018-01-29 DIAGNOSIS — E03.9 HYPOTHYROIDISM, UNSPECIFIED TYPE: ICD-10-CM

## 2018-01-30 NOTE — TELEPHONE ENCOUNTER
Was the patient seen in the last year in this department? Yes     Does patient have an active prescription for medications requested? No     Received Request Via: Pharmacy      Pt met protocol?: Yes, OV last month, TSH checked 12/17 (within range)

## 2018-01-31 RX ORDER — LEVOTHYROXINE SODIUM 88 UG/1
TABLET ORAL
Qty: 90 TAB | Refills: 3 | Status: SHIPPED | OUTPATIENT
Start: 2018-01-31 | End: 2019-01-14

## 2018-02-01 DIAGNOSIS — E78.00 PURE HYPERCHOLESTEROLEMIA: ICD-10-CM

## 2018-02-03 RX ORDER — ATORVASTATIN CALCIUM 40 MG/1
TABLET, FILM COATED ORAL
Qty: 90 TAB | Refills: 1 | Status: SHIPPED | OUTPATIENT
Start: 2018-02-03 | End: 2018-08-08 | Stop reason: SDUPTHER

## 2018-02-03 NOTE — TELEPHONE ENCOUNTER
Was the patient seen in the last year in this department? Yes     Does patient have an active prescription for medications requested? No     Received Request Via: Pharmacy      Pt met protocol?: Yes pt last ov 12/17   Lab Results  Component Value Date/Time   CHOLSTRLTOT 197 12/27/2017 1021   TRIGLYCERIDE 76 12/27/2017 1021   HDL 71 12/27/2017 1021    (H) 12/27/2017 1021

## 2018-02-03 NOTE — TELEPHONE ENCOUNTER
Pt has had OV within the 12 month protocol and lipid panel is current. 6 month supply sent to pharmacy.   Lab Results   Component Value Date/Time    CHOLSTRLTOT 197 12/27/2017 10:21 AM     (H) 12/27/2017 10:21 AM    HDL 71 12/27/2017 10:21 AM    TRIGLYCERIDE 76 12/27/2017 10:21 AM       Lab Results   Component Value Date/Time    SODIUM 143 12/27/2017 10:21 AM    POTASSIUM 4.2 12/27/2017 10:21 AM    CHLORIDE 107 12/27/2017 10:21 AM    CO2 29 12/27/2017 10:21 AM    GLUCOSE 92 12/27/2017 10:21 AM    BUN 24 (H) 12/27/2017 10:21 AM    CREATININE 1.29 12/27/2017 10:21 AM    CREATININE 0.8 02/27/2009 02:02 PM     Lab Results   Component Value Date/Time    ALKPHOSPHAT 61 12/27/2017 10:21 AM    ASTSGOT 13 12/27/2017 10:21 AM    ALTSGPT 15 12/27/2017 10:21 AM    TBILIRUBIN 0.4 12/27/2017 10:21 AM

## 2018-02-20 DIAGNOSIS — M79.7 FIBROMYALGIA: ICD-10-CM

## 2018-02-20 RX ORDER — GABAPENTIN 800 MG/1
800 TABLET ORAL 3 TIMES DAILY
Qty: 270 TAB | Refills: 1 | Status: SHIPPED | OUTPATIENT
Start: 2018-02-20 | End: 2018-08-28 | Stop reason: SDUPTHER

## 2018-02-23 ENCOUNTER — OFFICE VISIT (OUTPATIENT)
Dept: MEDICAL GROUP | Facility: PHYSICIAN GROUP | Age: 68
End: 2018-02-23
Payer: MEDICARE

## 2018-02-23 VITALS
SYSTOLIC BLOOD PRESSURE: 120 MMHG | OXYGEN SATURATION: 94 % | TEMPERATURE: 98.5 F | HEIGHT: 64 IN | BODY MASS INDEX: 26.29 KG/M2 | WEIGHT: 154 LBS | DIASTOLIC BLOOD PRESSURE: 84 MMHG | RESPIRATION RATE: 16 BRPM | HEART RATE: 80 BPM

## 2018-02-23 DIAGNOSIS — R09.81 SINUS CONGESTION: ICD-10-CM

## 2018-02-23 DIAGNOSIS — R19.7 DIARRHEA IN ADULT PATIENT: ICD-10-CM

## 2018-02-23 DIAGNOSIS — J32.9 CHRONIC SINUSITIS, UNSPECIFIED LOCATION: ICD-10-CM

## 2018-02-23 PROCEDURE — 99214 OFFICE O/P EST MOD 30 MIN: CPT | Performed by: NURSE PRACTITIONER

## 2018-02-23 RX ORDER — SENNOSIDES 8.6 MG
650 CAPSULE ORAL EVERY 8 HOURS
COMMUNITY
End: 2019-09-03

## 2018-02-25 NOTE — ASSESSMENT & PLAN NOTE
See additional notes on chronic sinusitis. Patient has been referred to ENT for further evaluation.

## 2018-02-25 NOTE — ASSESSMENT & PLAN NOTE
Patient has been struggling with diarrhea for the past two months. She states on at least five days per week she has diarrhea that is mostly watery, and in some cases she is unable to hold it. She is not having any other symptoms with it such as vomiting, fever, headache, rash, gas and bloating--however she has had episodes of nausea and mild general abdominal pain. Nobody else in her household have these symptoms. She drinks mostly bottled water, and if she cooks with tap water it is usually boiled first. She does state that the last time she had diarrhea like this that she did have pancreatitis as this was associated with a lodged stone and her bile duct and she is concerned about this. However she has had her gallbladder removed. She has not changed her diet, but she did start a new new medication (Wellbutrin) but this was after the diarrhea started. Will order labs and stool studies as well as abdominal ultrasound. She has not tried over-the-counter Imodium, I encouraged her to use this as directed on package. I did discuss with her that if labs, ultrasound do not show anything in her symptoms to not improve with medications that she will likely need referral back to gastroenterology for further evaluation. I encouraged her to avoid foods that cause diarrhea.

## 2018-02-25 NOTE — PROGRESS NOTES
Chief Complaint   Patient presents with   • Diarrhea     x 2 months         This is a 67 y.o.female patient that presents today with the following: diarrhea    Chronic sinusitis  Patient continues to struggle with symptoms of chronic sinusitis. She has been consistently taking over the counter medication such as Flonase, nasal saline and second-generation antihistamines with no relief. She has been treated with antibiotics as well and the past but her symptoms seem to continue to return. We are going to refer to ENT for further evaluation of chronic sinusitis.    Sinus congestion  See additional notes on chronic sinusitis. Patient has been referred to ENT for further evaluation.    Diarrhea in adult patient  Patient has been struggling with diarrhea for the past two months. She states on at least five days per week she has diarrhea that is mostly watery, and in some cases she is unable to hold it. She is not having any other symptoms with it such as vomiting, fever, headache, rash, gas and bloating--however she has had episodes of nausea and mild general abdominal pain. Nobody else in her household have these symptoms. She drinks mostly bottled water, and if she cooks with tap water it is usually boiled first. She does state that the last time she had diarrhea like this that she did have pancreatitis as this was associated with a lodged stone and her bile duct and she is concerned about this. However she has had her gallbladder removed. She has not changed her diet, but she did start a new new medication (Wellbutrin) but this was after the diarrhea started. Will order labs and stool studies as well as abdominal ultrasound. She has not tried over-the-counter Imodium, I encouraged her to use this as directed on package. I did discuss with her that if labs, ultrasound do not show anything in her symptoms to not improve with medications that she will likely need referral back to gastroenterology for further evaluation.  I encouraged her to avoid foods that cause diarrhea.      No visits with results within 1 Month(s) from this visit.   Latest known visit with results is:   Hospital Outpatient Visit on 12/28/2017   Component Date Value   • TSH 12/28/2017 0.700          clinical course has been stable    Past Medical History:   Diagnosis Date   • Anxiety    • Common bile duct (CBD) stricture     hx of in 1990s   • Degenerative joint disease     bulging discs in C-spine, L-spine   • Depression     sees Psych in [place]   • Fibromyalgia    • History of mammogram     9/11, diagnostic showed breast cyst, rec 1 year   • Hyperlipidemia    • Hypertension    • Pap test, as part of routine gynecological examination     last 2008, no abn history   • Post concussive syndrome     after fall in 1999   • PTSD (post-traumatic stress disorder)    • S/P colonoscopy     nl at age 52, due 2013   • Skin cancer     SCC       Past Surgical History:   Procedure Laterality Date   • HIP CANNULATED SCREW Right 4/17/2017    Procedure: HIP CANNULATED SCREW;  Surgeon: Dhaval Poole M.D.;  Location: SURGERY [facility];  Service:    • ESTEFANÍA BY LAPAROSCOPY     • EGD W/ENDOSCOPIC ULTRASOUND      1990s, common bile duct stricture, no stent       Family History   Problem Relation Age of Onset   • Cancer Mother        Ampicillin and Latex    Current Outpatient Prescriptions Ordered in [facility]   Medication Sig Dispense Refill   • acetaminophen (TYLENOL ARTHRITIS PAIN) 650 MG CR tablet Take 650 mg by mouth every 8 hours.     • gabapentin (NEURONTIN) 800 MG tablet Take 1 Tab by mouth 3 times a day. 270 Tab 1   • atorvastatin (LIPITOR) 40 MG Tab TAKE ONE TABLET BY MOUTH ONCE DAILY 90 Tab 1   • levothyroxine (SYNTHROID) 88 MCG Tab TAKE ONE TABLET BY MOUTH ONCE DAILY 90 Tab 3   • buPROPion SR (WELLBUTRIN SR) 100 MG TABLET SR 12 HR Take 100 mg by mouth 2 times a day.     • lisinopril-hydrochlorothiazide (PRINZIDE, ZESTORETIC) 20-12.5 MG per tablet TAKE 1 TABLET DAILY 90  "Tab 3   • celecoxib (CELEBREX) 100 MG Cap TAKE ONE CAPSULE BY MOUTH ONCE DAILY 90 Cap 0   • fluticasone (FLONASE) 50 MCG/ACT nasal spray Spray 1 Spray in nose every day.     • citalopram (CELEXA) 20 MG Tab Take 20 mg by mouth every day.     • clonazepam (KLONOPIN) 1 MG Tab Take 0.5-1 mg by mouth 3 times a day. 0.5mg in am, 0.5mg 1400, 1mg at bedtime.     • omeprazole (PRILOSEC) 20 MG delayed-release capsule Take 20 mg by mouth every day.     • CALCIUM-VITAMIN D PO Take  by mouth.     • trazodone (DESYREL) 50 MG TABS Take 100 mg by mouth every evening.     • Naproxen Sodium (ALEVE) 220 MG Cap Take  by mouth.     • magnesium chloride SR (SLO-MAG) 535 (64 Mg) MG Tab CR Take 535 mg by mouth every day.     • NON SPECIFIED Please administer Zostivax immunization 1 Each 0   • predniSONE (DELTASONE) 20 MG Tab Take 2 tablets daily for 5 days--take in am 10 Tab 0   • topiramate (TOPAMAX) 100 MG Tab Take 1 Tab by mouth 2 times a day. 60 Tab 4     No current Epic-ordered facility-administered medications on file.        Constitutional ROS: No unexpected change in weight, No weakness, No unexplained fevers, sweats, or chills  Pulmonary ROS: No chronic cough, sputum, or hemoptysis, No shortness of breath, No recent change in breathing  Cardiovascular ROS: No chest pain, No edema, No palpitations  Gastrointestinal ROS: positive per HPI  Musculoskeletal/Extremities ROS: No clubbing, No peripheral edema, No pain, redness or swelling on the joints  Neurologic ROS: Normal development, No seizures, No weakness    Physical exam:  /84   Pulse 80   Temp 36.9 °C (98.5 °F)   Resp 16   Ht 1.626 m (5' 4\")   Wt 69.9 kg (154 lb)   SpO2 94%   BMI 26.43 kg/m²   General Appearance: older female, alert, no distress, mildly overweight, well groomed  Skin: Skin color, texture, turgor normal. No rashes or lesions.  Lungs: negative findings: normal respiratory rate and rhythm, lungs clear to auscultation  Heart: negative. RRR without " murmur, gallop, or rubs.  No ectopy.  Abdomen: Abdomen soft, mildly tender with palpation. BS normal. No masses,  No organomegaly  Musculoskeletal: negative findings: ROM of all joints is normal, strength normal, no deformities present  Neurologic: intact, oriented, mood appropriate, judgment intact. Cranial nerves two through 12 grossly intact    Medical decision making/discussion: will get labs and ultrasound. We will also check still studies. Discussed with patient if symptoms do not improve with the modem and worked up his negative she will need referral to gastroenterology for further evaluation. She has also been referred to ENT for symptoms of chronic sinusitis.    Elizabeth was seen today for diarrhea.    Diagnoses and all orders for this visit:    Sinus congestion  -     REFERRAL TO ENT    Diarrhea in adult patient  -     CBC WITH DIFFERENTIAL; Future  -     LIPASE; Future  -     AMYLASE; Future  -     US-ABDOMEN COMPLETE SURVEY; Future  -     CULTURE STOOL; Future  -     COMPLETE O&P; Future  -     C Diff by PCR rflx Toxin; Future    Chronic sinusitis, unspecified location  -     REFERRAL TO ENT          Please note that this dictation was created using voice recognition software. I have made every reasonable attempt to correct obvious errors, but I expect that there are errors of grammar and possibly content that I did not discover before finalizing the note.

## 2018-02-25 NOTE — ASSESSMENT & PLAN NOTE
Patient continues to struggle with symptoms of chronic sinusitis. She has been consistently taking over the counter medication such as Flonase, nasal saline and second-generation antihistamines with no relief. She has been treated with antibiotics as well and the past but her symptoms seem to continue to return. We are going to refer to ENT for further evaluation of chronic sinusitis.

## 2018-02-26 ENCOUNTER — HOSPITAL ENCOUNTER (OUTPATIENT)
Dept: LAB | Facility: MEDICAL CENTER | Age: 68
End: 2018-02-26
Attending: NURSE PRACTITIONER
Payer: MEDICARE

## 2018-02-26 DIAGNOSIS — R19.7 DIARRHEA IN ADULT PATIENT: ICD-10-CM

## 2018-02-26 LAB
AMYLASE SERPL-CCNC: 49 U/L (ref 20–103)
BASOPHILS # BLD AUTO: 0.4 % (ref 0–1.8)
BASOPHILS # BLD: 0.02 K/UL (ref 0–0.12)
C DIFF DNA SPEC QL NAA+PROBE: NEGATIVE
C DIFF TOX GENS STL QL NAA+PROBE: NEGATIVE
EOSINOPHIL # BLD AUTO: 0.14 K/UL (ref 0–0.51)
EOSINOPHIL NFR BLD: 2.8 % (ref 0–6.9)
ERYTHROCYTE [DISTWIDTH] IN BLOOD BY AUTOMATED COUNT: 44.8 FL (ref 35.9–50)
HCT VFR BLD AUTO: 38.7 % (ref 37–47)
HGB BLD-MCNC: 12.5 G/DL (ref 12–16)
IMM GRANULOCYTES # BLD AUTO: 0.01 K/UL (ref 0–0.11)
IMM GRANULOCYTES NFR BLD AUTO: 0.2 % (ref 0–0.9)
LIPASE SERPL-CCNC: 39 U/L (ref 11–82)
LYMPHOCYTES # BLD AUTO: 1.67 K/UL (ref 1–4.8)
LYMPHOCYTES NFR BLD: 33.4 % (ref 22–41)
MCH RBC QN AUTO: 31.6 PG (ref 27–33)
MCHC RBC AUTO-ENTMCNC: 32.3 G/DL (ref 33.6–35)
MCV RBC AUTO: 97.7 FL (ref 81.4–97.8)
MONOCYTES # BLD AUTO: 0.36 K/UL (ref 0–0.85)
MONOCYTES NFR BLD AUTO: 7.2 % (ref 0–13.4)
NEUTROPHILS # BLD AUTO: 2.8 K/UL (ref 2–7.15)
NEUTROPHILS NFR BLD: 56 % (ref 44–72)
NRBC # BLD AUTO: 0 K/UL
NRBC BLD-RTO: 0 /100 WBC
PLATELET # BLD AUTO: 240 K/UL (ref 164–446)
PMV BLD AUTO: 9.2 FL (ref 9–12.9)
RBC # BLD AUTO: 3.96 M/UL (ref 4.2–5.4)
WBC # BLD AUTO: 5 K/UL (ref 4.8–10.8)

## 2018-02-26 PROCEDURE — 87329 GIARDIA AG IA: CPT

## 2018-02-26 PROCEDURE — 87045 FECES CULTURE AEROBIC BACT: CPT

## 2018-02-26 PROCEDURE — 82150 ASSAY OF AMYLASE: CPT

## 2018-02-26 PROCEDURE — 85025 COMPLETE CBC W/AUTO DIFF WBC: CPT

## 2018-02-26 PROCEDURE — 87493 C DIFF AMPLIFIED PROBE: CPT

## 2018-02-26 PROCEDURE — 87046 STOOL CULTR AEROBIC BACT EA: CPT

## 2018-02-26 PROCEDURE — 36415 COLL VENOUS BLD VENIPUNCTURE: CPT

## 2018-02-26 PROCEDURE — 87328 CRYPTOSPORIDIUM AG IA: CPT

## 2018-02-26 PROCEDURE — 83690 ASSAY OF LIPASE: CPT

## 2018-02-27 LAB
G LAMBLIA+C PARVUM AG STL QL RAPID: NORMAL
G LAMBLIA+C PARVUM AG STL QL RAPID: NORMAL
SIGNIFICANT IND 70042: NORMAL
SITE SITE: NORMAL
SOURCE SOURCE: NORMAL

## 2018-02-28 ENCOUNTER — HOSPITAL ENCOUNTER (OUTPATIENT)
Dept: RADIOLOGY | Facility: MEDICAL CENTER | Age: 68
End: 2018-02-28
Attending: NURSE PRACTITIONER
Payer: MEDICARE

## 2018-02-28 DIAGNOSIS — R19.7 DIARRHEA IN ADULT PATIENT: ICD-10-CM

## 2018-02-28 PROCEDURE — 76700 US EXAM ABDOM COMPLETE: CPT

## 2018-03-01 LAB
BACTERIA STL CULT: NORMAL
SIGNIFICANT IND 70042: NORMAL
SITE SITE: NORMAL
SOURCE SOURCE: NORMAL

## 2018-03-22 ENCOUNTER — PATIENT OUTREACH (OUTPATIENT)
Dept: HEALTH INFORMATION MANAGEMENT | Facility: OTHER | Age: 68
End: 2018-03-22

## 2018-03-22 NOTE — PROGRESS NOTES
1. Attempt #: 1    2. HealthConnect Verified: yes    3. Verify PCP: yes    4. Care Team Updated:       •   DME Company (gait device, O2, CPAP, etc.): YES       •   Other Specialists (eye doctor, derm, GYN, cardiology, endo, etc): YES    5.  Reviewed/Updated the following with patient:       •   Communication Preference Obtained? YES       •   Preferred Pharmacy? YES       •   Preferred Lab? YES       •   Family History (document living status of immediate family members and if + hx of cancer, diabetes, hypertension, hyperlipidemia, heart attack, stroke) YES. Was Abstract Encounter opened and chart updated? YES    6. [employer] Activation: already active    7. [employer] Shirlene: no    8. Annual Wellness Visit Scheduling  Scheduling Status:Scheduled      9. Care Gap Scheduling (Attempt to Schedule EACH Overdue Care Gap!)     Health Maintenance Due   Topic Date Due   • Annual Wellness Visit  1950   • IMM ZOSTER VACCINE  09/27/2012   • MAMMOGRAM  12/15/2017        Scheduled patient for Annual Wellness Visit    10. Patient was advised: “This is a free wellness visit. The provider will screen for medical conditions to help you stay healthy. If you have other concerns to address you may be asked to discuss these at a separate visit or there may be an additional fee.”     11. Patient was informed to arrive 15 min prior to their scheduled appointment and bring in their medication bottles.

## 2018-05-16 NOTE — PROGRESS NOTES
Outcome: Left Message    Please transfer to Patient Outreach Team at [phone] when patient returns call.      HealthConnect Verified: yes    Attempt # 1

## 2018-05-22 ENCOUNTER — OFFICE VISIT (OUTPATIENT)
Dept: MEDICAL GROUP | Facility: PHYSICIAN GROUP | Age: 68
End: 2018-05-22
Payer: MEDICARE

## 2018-05-22 VITALS
HEART RATE: 84 BPM | WEIGHT: 154 LBS | BODY MASS INDEX: 26.29 KG/M2 | DIASTOLIC BLOOD PRESSURE: 76 MMHG | OXYGEN SATURATION: 98 % | TEMPERATURE: 98 F | SYSTOLIC BLOOD PRESSURE: 114 MMHG | RESPIRATION RATE: 16 BRPM | HEIGHT: 64 IN

## 2018-05-22 DIAGNOSIS — H92.01 DISCOMFORT OF RIGHT EAR: ICD-10-CM

## 2018-05-22 PROCEDURE — 99213 OFFICE O/P EST LOW 20 MIN: CPT | Performed by: NURSE PRACTITIONER

## 2018-05-22 ASSESSMENT — PATIENT HEALTH QUESTIONNAIRE - PHQ9: CLINICAL INTERPRETATION OF PHQ2 SCORE: 0

## 2018-05-22 NOTE — ASSESSMENT & PLAN NOTE
Patient has recently started wearing bilateral hearing aids due to hearing loss.  She was taking out the hearing aid of her right ear and noticed that the plastic tip was missing and could not find it anywhere and is concerned that it may still be in her right EAC.  She did call her cardiologist who advised her to be seen by her primary care provider to check her EAC.  She does complain of some mild right ear discomfort and fullness.  Upon physical examination it was noted that her EAC is clear, no evidence of the tip of her hearing aid.  Her right TM is mildly retracted.  I did advise her to keep her upcoming appointment with audiology for further evaluation and treatment.

## 2018-05-22 NOTE — PROGRESS NOTES
Chief Complaint   Patient presents with   • Foreign Body in Ear     R ear, suspected hearing aid piece x 2 days         This is a 67 y.o.female patient that presents today with the following: Right ear discomfort    Discomfort of right ear  Patient has recently started wearing bilateral hearing aids due to hearing loss.  She was taking out the hearing aid of her right ear and noticed that the plastic tip was missing and could not find it anywhere and is concerned that it may still be in her right EAC.  She did call her cardiologist who advised her to be seen by her primary care provider to check her EAC.  She does complain of some mild right ear discomfort and fullness.  Upon physical examination it was noted that her EAC is clear, no evidence of the tip of her hearing aid.  Her right TM is mildly retracted.  I did advise her to keep her upcoming appointment with audiology for further evaluation and treatment.      No visits with results within 1 Month(s) from this visit.   Latest known visit with results is:   Hospital Outpatient Visit on 02/26/2018   Component Date Value   • WBC 02/26/2018 5.0    • RBC 02/26/2018 3.96*   • Hemoglobin 02/26/2018 12.5    • Hematocrit 02/26/2018 38.7    • MCV 02/26/2018 97.7    • MCH 02/26/2018 31.6    • MCHC 02/26/2018 32.3*   • RDW 02/26/2018 44.8    • Platelet Count 02/26/2018 240    • MPV 02/26/2018 9.2    • Neutrophils-Polys 02/26/2018 56.00    • Lymphocytes 02/26/2018 33.40    • Monocytes 02/26/2018 7.20    • Eosinophils 02/26/2018 2.80    • Basophils 02/26/2018 0.40    • Immature Granulocytes 02/26/2018 0.20    • Nucleated RBC 02/26/2018 0.00    • Neutrophils (Absolute) 02/26/2018 2.80    • Lymphs (Absolute) 02/26/2018 1.67    • Monos (Absolute) 02/26/2018 0.36    • Eos (Absolute) 02/26/2018 0.14    • Baso (Absolute) 02/26/2018 0.02    • Immature Granulocytes (a* 02/26/2018 0.01    • NRBC (Absolute) 02/26/2018 0.00    • Lipase 02/26/2018 39    • Amylase 02/26/2018 49    •  Significant Indicator 02/26/2018 NEG    • Source 02/26/2018 STL    • Site 02/26/2018 STOOL    • Culture Result Stool 02/26/2018                      Value:Shiga Toxin testing not performed due to lack of growth in  [place] broth.  No enteric pathogens, only usual Gram positive enteric  stephany isolated.  NOTE:  Stool cultures are screened for Shiga Toxins 1 and 2,  Salmonella, Shigella, Campylobacter, Aeromonas,  Plesiomonas, and Vibrio.     • C Diff by PCR 02/26/2018 Negative    • 027-NAP1-BI Presumptive 02/26/2018 Negative    • Significant Indicator 02/26/2018 NEG    • Source 02/26/2018 STL    • Site 02/26/2018 STOOL    • Ova And Parasites Antige* 02/26/2018                      Value:Negative for Giardia lamblia antigen.  Negative for Cryptosporidium parvum antigen.  NOTE:  The Cryptosporidium/Giardia assay is a rapid test for the  presence or absence of these specific antigens.  In special  circumstances, a physician may need to request a complete  ova and parasite procedure when the patient meets certain  criteria. For example, recent travel abroad,immunosupression,  recent immigration, persistent undiagnosed diarrhea, or  persistent unexplained eosinophilia may be conditions to  warrant a complete ova and parasite examination.  In these  special cases, or if the physician suspects another specific  gastrointestinal parasite,the Microbiology Department can  perform a complete ova and parasite microscopic examination.  The request for a complete ova and parasite examination must  come directly from the physician (or designee) within the  seven days of the original stool specimen being received in  the Microbiology Department.  Stool specimens are discarded  after                           seven days of storage.     • Ova And Parasites Antige* 02/26/2018                      Value:Negative for Giardia lamblia antigen.  Negative for Cryptosporidium parvum antigen.  NOTE:  The Cryptosporidium/Giardia assay is a  rapid test for the  presence or absence of these specific antigens.  In special  circumstances, a physician may need to request a complete  ova and parasite procedure when the patient meets certain  criteria. For example, recent travel abroad,immunosupression,  recent immigration, persistent undiagnosed diarrhea, or  persistent unexplained eosinophilia may be conditions to  warrant a complete ova and parasite examination.  In these  special cases, or if the physician suspects another specific  gastrointestinal parasite,the Microbiology Department can  perform a complete ova and parasite microscopic examination.  The request for a complete ova and parasite examination must  come directly from the physician (or designee) within the  seven days of the original stool specimen being received in  the Microbiology Department.  Stool specimens are discarded  after                           seven days of storage.           clinical course has been stable    Past Medical History:   Diagnosis Date   • Anxiety    • Common bile duct (CBD) stricture     hx of in 1990s   • Degenerative joint disease     bulging discs in C-spine, L-spine   • Depression     sees Psych in [place]   • Fibromyalgia    • History of mammogram     9/11, diagnostic showed breast cyst, rec 1 year   • Hyperlipidemia    • Hypertension    • Pap test, as part of routine gynecological examination     last 2008, no abn history   • Post concussive syndrome     after fall in 1999   • PTSD (post-traumatic stress disorder)    • S/P colonoscopy     nl at age 52, due 2013   • Skin cancer     SCC       Past Surgical History:   Procedure Laterality Date   • HIP CANNULATED SCREW Right 4/17/2017    Procedure: HIP CANNULATED SCREW;  Surgeon: Dhaval Poole M.D.;  Location: SURGERY [facility];  Service:    • ESTEFANÍA BY LAPAROSCOPY     • EGD W/ENDOSCOPIC ULTRASOUND      1990s, common bile duct stricture, no stent       Family History   Problem Relation Age of Onset   •  Cancer Mother      BREAST   • Hypertension Mother    • Hyperlipidemia Mother    • Hypertension Father    • Hyperlipidemia Father        Ampicillin and Latex    Current Outpatient Prescriptions Ordered in [facility]   Medication Sig Dispense Refill   • acetaminophen (TYLENOL ARTHRITIS PAIN) 650 MG CR tablet Take 650 mg by mouth every 8 hours.     • gabapentin (NEURONTIN) 800 MG tablet Take 1 Tab by mouth 3 times a day. 270 Tab 1   • atorvastatin (LIPITOR) 40 MG Tab TAKE ONE TABLET BY MOUTH ONCE DAILY 90 Tab 1   • levothyroxine (SYNTHROID) 88 MCG Tab TAKE ONE TABLET BY MOUTH ONCE DAILY 90 Tab 3   • buPROPion SR (WELLBUTRIN SR) 100 MG TABLET SR 12 HR Take 100 mg by mouth 2 times a day.     • magnesium chloride SR (SLO-MAG) 535 (64 Mg) MG Tab CR Take 535 mg by mouth every day.     • NON SPECIFIED Please administer Zostivax immunization 1 Each 0   • lisinopril-hydrochlorothiazide (PRINZIDE, ZESTORETIC) 20-12.5 MG per tablet TAKE 1 TABLET DAILY 90 Tab 3   • predniSONE (DELTASONE) 20 MG Tab Take 2 tablets daily for 5 days--take in am 10 Tab 0   • celecoxib (CELEBREX) 100 MG Cap TAKE ONE CAPSULE BY MOUTH ONCE DAILY 90 Cap 0   • fluticasone (FLONASE) 50 MCG/ACT nasal spray Spray 1 Spray in nose every day.     • citalopram (CELEXA) 20 MG Tab Take 20 mg by mouth every day.     • clonazepam (KLONOPIN) 1 MG Tab Take 0.5 mg by mouth 2 times a day. 0.5mg in am, 0.5mg 1400, 1mg at bedtime.      • omeprazole (PRILOSEC) 20 MG delayed-release capsule Take 20 mg by mouth every day.     • topiramate (TOPAMAX) 100 MG Tab Take 1 Tab by mouth 2 times a day. 60 Tab 4   • CALCIUM-VITAMIN D PO Take  by mouth.     • trazodone (DESYREL) 50 MG TABS Take 100 mg by mouth every evening.       No current Epic-ordered facility-administered medications on file.        Constitutional ROS: No unexpected change in weight, No weakness, No unexplained fevers, sweats, or chills  Ear ROS: Positive per HPI  Pulmonary ROS: No chronic cough, sputum, or hemoptysis, No  "shortness of breath, No recent change in breathing  Cardiovascular ROS: No chest pain, No edema, No palpitations  Neurologic ROS: Normal development, No seizures, No weakness    Physical exam:  /76   Pulse 84   Temp 36.7 °C (98 °F)   Resp 16   Ht 1.626 m (5' 4\")   Wt 69.9 kg (154 lb)   SpO2 98%   BMI 26.43 kg/m²   General Appearance: Older female, alert, no distress, well-nourished, well-groomed  Skin: Skin color, texture, turgor normal. No rashes or lesions.  Ears: External ears normal. Canals clear. TM's normal, no evidence of foreign body in right EAC  Lungs: negative findings: normal respiratory rate and rhythm, normal effort  Musculoskeletal: negative findings: no evidence of joint instability, strength normal, no deformities present  Neurologic: intact    Medical decision making/discussion: Patient is to keep her upcoming appointment with me for annual wellness exam.  She is also to keep her upcoming appointment with cardiology as already scheduled.    Elizabeth was seen today for foreign body in ear.    Diagnoses and all orders for this visit:    Discomfort of right ear          Please note that this dictation was created using voice recognition software. I have made every reasonable attempt to correct obvious errors, but I expect that there are errors of grammar and possibly content that I did not discover before finalizing the note.        "

## 2018-06-14 ENCOUNTER — HOSPITAL ENCOUNTER (OUTPATIENT)
Facility: MEDICAL CENTER | Age: 68
End: 2018-06-14
Attending: NURSE PRACTITIONER
Payer: MEDICARE

## 2018-06-14 ENCOUNTER — OFFICE VISIT (OUTPATIENT)
Dept: MEDICAL GROUP | Facility: PHYSICIAN GROUP | Age: 68
End: 2018-06-14
Payer: MEDICARE

## 2018-06-14 VITALS
SYSTOLIC BLOOD PRESSURE: 108 MMHG | DIASTOLIC BLOOD PRESSURE: 70 MMHG | TEMPERATURE: 98.6 F | WEIGHT: 150 LBS | BODY MASS INDEX: 25.61 KG/M2 | OXYGEN SATURATION: 98 % | HEIGHT: 64 IN | RESPIRATION RATE: 16 BRPM | HEART RATE: 76 BPM

## 2018-06-14 DIAGNOSIS — Z87.440 HISTORY OF RECURRENT UTI (URINARY TRACT INFECTION): ICD-10-CM

## 2018-06-14 DIAGNOSIS — R30.0 DYSURIA: ICD-10-CM

## 2018-06-14 DIAGNOSIS — N39.0 RECURRENT UTI (URINARY TRACT INFECTION): ICD-10-CM

## 2018-06-14 LAB
APPEARANCE UR: CLEAR
BILIRUB UR STRIP-MCNC: NORMAL MG/DL
COLOR UR AUTO: YELLOW
GLUCOSE UR STRIP.AUTO-MCNC: NORMAL MG/DL
KETONES UR STRIP.AUTO-MCNC: NORMAL MG/DL
LEUKOCYTE ESTERASE UR QL STRIP.AUTO: NORMAL
NITRITE UR QL STRIP.AUTO: NORMAL
PH UR STRIP.AUTO: 6 [PH] (ref 5–8)
PROT UR QL STRIP: NORMAL MG/DL
RBC UR QL AUTO: NORMAL
SP GR UR STRIP.AUTO: 1.02
UROBILINOGEN UR STRIP-MCNC: NORMAL MG/DL

## 2018-06-14 PROCEDURE — 81002 URINALYSIS NONAUTO W/O SCOPE: CPT | Performed by: NURSE PRACTITIONER

## 2018-06-14 PROCEDURE — 99214 OFFICE O/P EST MOD 30 MIN: CPT | Performed by: NURSE PRACTITIONER

## 2018-06-14 PROCEDURE — 87186 SC STD MICRODIL/AGAR DIL: CPT

## 2018-06-14 PROCEDURE — 87077 CULTURE AEROBIC IDENTIFY: CPT

## 2018-06-14 PROCEDURE — 87086 URINE CULTURE/COLONY COUNT: CPT

## 2018-06-14 RX ORDER — NITROFURANTOIN 25; 75 MG/1; MG/1
100 CAPSULE ORAL EVERY 12 HOURS
Qty: 10 CAP | Refills: 0 | Status: SHIPPED | OUTPATIENT
Start: 2018-06-14 | End: 2018-06-19

## 2018-06-14 RX ORDER — PHENAZOPYRIDINE HYDROCHLORIDE 200 MG/1
200 TABLET, FILM COATED ORAL 3 TIMES DAILY PRN
Qty: 10 TAB | Refills: 0 | Status: SHIPPED | OUTPATIENT
Start: 2018-06-14 | End: 2018-07-12

## 2018-06-14 NOTE — ASSESSMENT & PLAN NOTE
Pt reporting symptoms of another UTI, she and her  had intercourse after a long time. She was seen by urology for frequent UTI.  She is having symptoms of burning with urination and frequency.  In office urinalysis does show trace of leukocytes, will send off for culture.  We will go ahead and treat her empirically with Macrobid, 100 mg twice daily for 5 days.  Discussed the importance of UTI prevention and that if she has another UTI, we will have to send her back to urology.

## 2018-06-15 NOTE — PROGRESS NOTES
Chief Complaint   Patient presents with   • Dysuria     x 2 weeks         This is a 67 y.o.female patient that presents today with the following: Dysuria    Recurrent UTI (urinary tract infection)  Pt reporting symptoms of another UTI, she and her  had intercourse after a long time. She was seen by urology for frequent UTI.  She is having symptoms of burning with urination and frequency.  In office urinalysis does show trace of leukocytes, will send off for culture.  We will go ahead and treat her empirically with Macrobid, 100 mg twice daily for 5 days.  Discussed the importance of UTI prevention and that if she has another UTI, we will have to send her back to urology.      No visits with results within 1 Month(s) from this visit.   Latest known visit with results is:   Hospital Outpatient Visit on 02/26/2018   Component Date Value   • WBC 02/26/2018 5.0    • RBC 02/26/2018 3.96*   • Hemoglobin 02/26/2018 12.5    • Hematocrit 02/26/2018 38.7    • MCV 02/26/2018 97.7    • MCH 02/26/2018 31.6    • MCHC 02/26/2018 32.3*   • RDW 02/26/2018 44.8    • Platelet Count 02/26/2018 240    • MPV 02/26/2018 9.2    • Neutrophils-Polys 02/26/2018 56.00    • Lymphocytes 02/26/2018 33.40    • Monocytes 02/26/2018 7.20    • Eosinophils 02/26/2018 2.80    • Basophils 02/26/2018 0.40    • Immature Granulocytes 02/26/2018 0.20    • Nucleated RBC 02/26/2018 0.00    • Neutrophils (Absolute) 02/26/2018 2.80    • Lymphs (Absolute) 02/26/2018 1.67    • Monos (Absolute) 02/26/2018 0.36    • Eos (Absolute) 02/26/2018 0.14    • Baso (Absolute) 02/26/2018 0.02    • Immature Granulocytes (a* 02/26/2018 0.01    • NRBC (Absolute) 02/26/2018 0.00    • Lipase 02/26/2018 39    • Amylase 02/26/2018 49    • Significant Indicator 02/26/2018 NEG    • Source 02/26/2018 STL    • Site 02/26/2018 STOOL    • Culture Result Stool 02/26/2018                      Value:Shiga Toxin testing not performed due to lack of growth in  [place] broth.  No  enteric pathogens, only usual Gram positive enteric  stephany isolated.  NOTE:  Stool cultures are screened for Shiga Toxins 1 and 2,  Salmonella, Shigella, Campylobacter, Aeromonas,  Plesiomonas, and Vibrio.     • C Diff by PCR 02/26/2018 Negative    • 027-NAP1-BI Presumptive 02/26/2018 Negative    • Significant Indicator 02/26/2018 NEG    • Source 02/26/2018 STL    • Site 02/26/2018 STOOL    • Ova And Parasites Antige* 02/26/2018                      Value:Negative for Giardia lamblia antigen.  Negative for Cryptosporidium parvum antigen.  NOTE:  The Cryptosporidium/Giardia assay is a rapid test for the  presence or absence of these specific antigens.  In special  circumstances, a physician may need to request a complete  ova and parasite procedure when the patient meets certain  criteria. For example, recent travel abroad,immunosupression,  recent immigration, persistent undiagnosed diarrhea, or  persistent unexplained eosinophilia may be conditions to  warrant a complete ova and parasite examination.  In these  special cases, or if the physician suspects another specific  gastrointestinal parasite,the Microbiology Department can  perform a complete ova and parasite microscopic examination.  The request for a complete ova and parasite examination must  come directly from the physician (or designee) within the  seven days of the original stool specimen being received in  the Microbiology Department.  Stool specimens are discarded  after                           seven days of storage.     • Ova And Parasites Antige* 02/26/2018                      Value:Negative for Giardia lamblia antigen.  Negative for Cryptosporidium parvum antigen.  NOTE:  The Cryptosporidium/Giardia assay is a rapid test for the  presence or absence of these specific antigens.  In special  circumstances, a physician may need to request a complete  ova and parasite procedure when the patient meets certain  criteria. For example, recent travel  abroad,immunosupression,  recent immigration, persistent undiagnosed diarrhea, or  persistent unexplained eosinophilia may be conditions to  warrant a complete ova and parasite examination.  In these  special cases, or if the physician suspects another specific  gastrointestinal parasite,the Microbiology Department can  perform a complete ova and parasite microscopic examination.  The request for a complete ova and parasite examination must  come directly from the physician (or designee) within the  seven days of the original stool specimen being received in  the Microbiology Department.  Stool specimens are discarded  after                           seven days of storage.           clinical course has been stable    Past Medical History:   Diagnosis Date   • Anxiety    • Common bile duct (CBD) stricture     hx of in 1990s   • Degenerative joint disease     bulging discs in C-spine, L-spine   • Depression     sees Psych in [place]   • Fibromyalgia    • History of mammogram     9/11, diagnostic showed breast cyst, rec 1 year   • Hyperlipidemia    • Hypertension    • Pap test, as part of routine gynecological examination     last 2008, no abn history   • Post concussive syndrome     after fall in 1999   • PTSD (post-traumatic stress disorder)    • S/P colonoscopy     nl at age 52, due 2013   • Skin cancer     SCC       Past Surgical History:   Procedure Laterality Date   • HIP CANNULATED SCREW Right 4/17/2017    Procedure: HIP CANNULATED SCREW;  Surgeon: Dhaval Poole M.D.;  Location: SURGERY [facility];  Service:    • ESTEFANÍA BY LAPAROSCOPY     • EGD W/ENDOSCOPIC ULTRASOUND      1990s, common bile duct stricture, no stent       Family History   Problem Relation Age of Onset   • Cancer Mother      BREAST   • Hypertension Mother    • Hyperlipidemia Mother    • Hypertension Father    • Hyperlipidemia Father        Ampicillin and Latex    Current Outpatient Prescriptions Ordered in [facility]   Medication Sig Dispense  Refill   • nitrofurantoin monohydr macro (MACROBID) 100 MG Cap Take 1 Cap by mouth every 12 hours for 5 days. 10 Cap 0   • phenazopyridine (PYRIDIUM) 200 MG Tab Take 1 Tab by mouth 3 times a day as needed. 10 Tab 0   • acetaminophen (TYLENOL ARTHRITIS PAIN) 650 MG CR tablet Take 650 mg by mouth every 8 hours.     • gabapentin (NEURONTIN) 800 MG tablet Take 1 Tab by mouth 3 times a day. 270 Tab 1   • atorvastatin (LIPITOR) 40 MG Tab TAKE ONE TABLET BY MOUTH ONCE DAILY 90 Tab 1   • levothyroxine (SYNTHROID) 88 MCG Tab TAKE ONE TABLET BY MOUTH ONCE DAILY 90 Tab 3   • buPROPion SR (WELLBUTRIN SR) 100 MG TABLET SR 12 HR Take 100 mg by mouth 2 times a day.     • magnesium chloride SR (SLO-MAG) 535 (64 Mg) MG Tab CR Take 535 mg by mouth every day.     • NON SPECIFIED Please administer Zostivax immunization 1 Each 0   • lisinopril-hydrochlorothiazide (PRINZIDE, ZESTORETIC) 20-12.5 MG per tablet TAKE 1 TABLET DAILY 90 Tab 3   • predniSONE (DELTASONE) 20 MG Tab Take 2 tablets daily for 5 days--take in am 10 Tab 0   • celecoxib (CELEBREX) 100 MG Cap TAKE ONE CAPSULE BY MOUTH ONCE DAILY 90 Cap 0   • fluticasone (FLONASE) 50 MCG/ACT nasal spray Spray 1 Spray in nose every day.     • citalopram (CELEXA) 20 MG Tab Take 20 mg by mouth every day.     • clonazepam (KLONOPIN) 1 MG Tab Take 0.5 mg by mouth 2 times a day. 0.5mg in am, 0.5mg 1400, 1mg at bedtime.      • omeprazole (PRILOSEC) 20 MG delayed-release capsule Take 20 mg by mouth every day.     • topiramate (TOPAMAX) 100 MG Tab Take 1 Tab by mouth 2 times a day. 60 Tab 4   • CALCIUM-VITAMIN D PO Take  by mouth.     • trazodone (DESYREL) 50 MG TABS Take 100 mg by mouth every evening.       No current Epic-ordered facility-administered medications on file.        Constitutional ROS: No unexpected change in weight, No weakness, No unexplained fevers, sweats, or chills  Pulmonary ROS: No chronic cough, sputum, or hemoptysis, No shortness of breath, No recent change in  "breathing  Cardiovascular ROS: No chest pain, No edema, No palpitations  Musculoskeletal/Extremities ROS: No clubbing, No peripheral edema, No pain, redness or swelling on the joints  Genitourinary ROS: Positive per HPI    Physical exam:  /70   Pulse 76   Temp 37 °C (98.6 °F)   Resp 16   Ht 1.626 m (5' 4\")   Wt 68 kg (150 lb)   SpO2 98%   BMI 25.75 kg/m²   General Appearance: Older female, alert, no distress, well-nourished, well-groomed  Skin: Skin color, texture, turgor normal. No rashes or lesions.  Lungs: negative findings: normal respiratory rate and rhythm, lungs clear to auscultation  Musculoskeletal: negative findings: ROM of all joints is normal, strength normal, no deformities present  Neurologic: intact, CN II through XII grossly intact    Medical decision making/discussion: Macrobid in Pyridium have been called in, she will be notified of the results of urine culture and if change in antibiotic is necessary.  She was given printed educational material on UTI prevention.  Discussed the possibility of going back to urology if she continues to have UTIs.    Elizabeth was seen today for dysuria.    Diagnoses and all orders for this visit:    Recurrent UTI (urinary tract infection)  -     POCT Urinalysis  -     Urine Culture; Future  -     nitrofurantoin monohydr macro (MACROBID) 100 MG Cap; Take 1 Cap by mouth every 12 hours for 5 days.  -     phenazopyridine (PYRIDIUM) 200 MG Tab; Take 1 Tab by mouth 3 times a day as needed.    Dysuria  -     POCT Urinalysis  -     Urine Culture; Future  -     nitrofurantoin monohydr macro (MACROBID) 100 MG Cap; Take 1 Cap by mouth every 12 hours for 5 days.  -     phenazopyridine (PYRIDIUM) 200 MG Tab; Take 1 Tab by mouth 3 times a day as needed.          Please note that this dictation was created using voice recognition software. I have made every reasonable attempt to correct obvious errors, but I expect that there are errors of grammar and possibly content " that I did not discover before finalizing the note.

## 2018-06-16 LAB
BACTERIA UR CULT: ABNORMAL
BACTERIA UR CULT: ABNORMAL
SIGNIFICANT IND 70042: ABNORMAL
SITE SITE: ABNORMAL
SOURCE SOURCE: ABNORMAL

## 2018-06-28 DIAGNOSIS — N39.0 RECURRENT UTI: ICD-10-CM

## 2018-06-28 RX ORDER — NITROFURANTOIN 25; 75 MG/1; MG/1
100 CAPSULE ORAL DAILY
Qty: 30 CAP | Refills: 2 | Status: SHIPPED | OUTPATIENT
Start: 2018-06-28 | End: 2019-01-14

## 2018-07-10 ENCOUNTER — HOSPITAL ENCOUNTER (OUTPATIENT)
Dept: LAB | Facility: MEDICAL CENTER | Age: 68
End: 2018-07-10
Attending: NURSE PRACTITIONER
Payer: MEDICARE

## 2018-07-10 DIAGNOSIS — E78.5 DYSLIPIDEMIA: ICD-10-CM

## 2018-07-10 DIAGNOSIS — R73.01 ELEVATED FASTING GLUCOSE: ICD-10-CM

## 2018-07-10 LAB
ALBUMIN SERPL BCP-MCNC: 4.6 G/DL (ref 3.2–4.9)
ALBUMIN/GLOB SERPL: 2.1 G/DL
ALP SERPL-CCNC: 68 U/L (ref 30–99)
ALT SERPL-CCNC: 16 U/L (ref 2–50)
ANION GAP SERPL CALC-SCNC: 6 MMOL/L (ref 0–11.9)
AST SERPL-CCNC: 12 U/L (ref 12–45)
BILIRUB SERPL-MCNC: 0.3 MG/DL (ref 0.1–1.5)
BUN SERPL-MCNC: 23 MG/DL (ref 8–22)
CALCIUM SERPL-MCNC: 10 MG/DL (ref 8.5–10.5)
CHLORIDE SERPL-SCNC: 104 MMOL/L (ref 96–112)
CHOLEST SERPL-MCNC: 176 MG/DL (ref 100–199)
CO2 SERPL-SCNC: 27 MMOL/L (ref 20–33)
CREAT SERPL-MCNC: 1.04 MG/DL (ref 0.5–1.4)
EST. AVERAGE GLUCOSE BLD GHB EST-MCNC: 97 MG/DL
GLOBULIN SER CALC-MCNC: 2.2 G/DL (ref 1.9–3.5)
GLUCOSE SERPL-MCNC: 88 MG/DL (ref 65–99)
HBA1C MFR BLD: 5 % (ref 0–5.6)
HDLC SERPL-MCNC: 58 MG/DL
LDLC SERPL CALC-MCNC: 92 MG/DL
POTASSIUM SERPL-SCNC: 4.1 MMOL/L (ref 3.6–5.5)
PROT SERPL-MCNC: 6.8 G/DL (ref 6–8.2)
SODIUM SERPL-SCNC: 137 MMOL/L (ref 135–145)
TRIGL SERPL-MCNC: 129 MG/DL (ref 0–149)

## 2018-07-10 PROCEDURE — 83036 HEMOGLOBIN GLYCOSYLATED A1C: CPT

## 2018-07-10 PROCEDURE — 80061 LIPID PANEL: CPT

## 2018-07-10 PROCEDURE — 36415 COLL VENOUS BLD VENIPUNCTURE: CPT

## 2018-07-10 PROCEDURE — 80053 COMPREHEN METABOLIC PANEL: CPT

## 2018-07-12 ENCOUNTER — OFFICE VISIT (OUTPATIENT)
Dept: MEDICAL GROUP | Facility: PHYSICIAN GROUP | Age: 68
End: 2018-07-12
Payer: MEDICARE

## 2018-07-12 ENCOUNTER — TELEPHONE (OUTPATIENT)
Dept: MEDICAL GROUP | Facility: PHYSICIAN GROUP | Age: 68
End: 2018-07-12

## 2018-07-12 VITALS
WEIGHT: 151 LBS | HEART RATE: 76 BPM | OXYGEN SATURATION: 98 % | TEMPERATURE: 99 F | HEIGHT: 64 IN | RESPIRATION RATE: 16 BRPM | SYSTOLIC BLOOD PRESSURE: 118 MMHG | BODY MASS INDEX: 25.78 KG/M2 | DIASTOLIC BLOOD PRESSURE: 74 MMHG

## 2018-07-12 DIAGNOSIS — K83.1 COMMON BILE DUCT (CBD) STRICTURE: ICD-10-CM

## 2018-07-12 DIAGNOSIS — R92.1 BREAST CALCIFICATION, RIGHT: ICD-10-CM

## 2018-07-12 DIAGNOSIS — F41.9 ANXIETY: ICD-10-CM

## 2018-07-12 DIAGNOSIS — E78.5 DYSLIPIDEMIA: ICD-10-CM

## 2018-07-12 DIAGNOSIS — N95.2 VAGINAL ATROPHY: ICD-10-CM

## 2018-07-12 DIAGNOSIS — N39.0 RECURRENT UTI (URINARY TRACT INFECTION): ICD-10-CM

## 2018-07-12 DIAGNOSIS — M54.16 LUMBAR RADICULOPATHY: ICD-10-CM

## 2018-07-12 DIAGNOSIS — D64.9 NORMOCYTIC ANEMIA: ICD-10-CM

## 2018-07-12 DIAGNOSIS — I10 ESSENTIAL HYPERTENSION: ICD-10-CM

## 2018-07-12 DIAGNOSIS — R30.0 DYSURIA: ICD-10-CM

## 2018-07-12 DIAGNOSIS — G43.009 MIGRAINE WITHOUT AURA AND WITHOUT STATUS MIGRAINOSUS, NOT INTRACTABLE: ICD-10-CM

## 2018-07-12 DIAGNOSIS — E55.9 VITAMIN D DEFICIENCY DISEASE: ICD-10-CM

## 2018-07-12 DIAGNOSIS — K21.9 GASTROESOPHAGEAL REFLUX DISEASE, ESOPHAGITIS PRESENCE NOT SPECIFIED: ICD-10-CM

## 2018-07-12 DIAGNOSIS — L98.7 EXCESSIVE AND REDUNDANT SKIN AND SUBCUTANEOUS TISSUE: ICD-10-CM

## 2018-07-12 DIAGNOSIS — M54.40 CHRONIC MIDLINE LOW BACK PAIN WITH SCIATICA, SCIATICA LATERALITY UNSPECIFIED: ICD-10-CM

## 2018-07-12 DIAGNOSIS — N60.01 CYST OF RIGHT BREAST: ICD-10-CM

## 2018-07-12 DIAGNOSIS — E03.9 HYPOTHYROIDISM, UNSPECIFIED TYPE: ICD-10-CM

## 2018-07-12 DIAGNOSIS — Z00.00 MEDICARE ANNUAL WELLNESS VISIT, INITIAL: Primary | ICD-10-CM

## 2018-07-12 DIAGNOSIS — J32.9 CHRONIC SINUSITIS, UNSPECIFIED LOCATION: ICD-10-CM

## 2018-07-12 DIAGNOSIS — B37.2 YEAST INFECTION OF THE SKIN: ICD-10-CM

## 2018-07-12 DIAGNOSIS — N18.30 CHRONIC KIDNEY DISEASE, STAGE 3 (HCC): ICD-10-CM

## 2018-07-12 DIAGNOSIS — G89.29 CHRONIC MIDLINE LOW BACK PAIN WITH SCIATICA, SCIATICA LATERALITY UNSPECIFIED: ICD-10-CM

## 2018-07-12 DIAGNOSIS — M79.7 FIBROMYALGIA: ICD-10-CM

## 2018-07-12 DIAGNOSIS — Z01.419 WELL WOMAN EXAM WITH ROUTINE GYNECOLOGICAL EXAM: ICD-10-CM

## 2018-07-12 DIAGNOSIS — F33.1 MODERATE EPISODE OF RECURRENT MAJOR DEPRESSIVE DISORDER (HCC): ICD-10-CM

## 2018-07-12 PROBLEM — R19.7 DIARRHEA IN ADULT PATIENT: Status: RESOLVED | Noted: 2018-02-23 | Resolved: 2018-07-12

## 2018-07-12 PROBLEM — S72.051D CLOSED FRACTURE OF HEAD OF RIGHT FEMUR WITH ROUTINE HEALING: Status: RESOLVED | Noted: 2017-06-21 | Resolved: 2018-07-12

## 2018-07-12 PROBLEM — H92.01 DISCOMFORT OF RIGHT EAR: Status: RESOLVED | Noted: 2018-05-22 | Resolved: 2018-07-12

## 2018-07-12 PROBLEM — R21 RASH: Status: RESOLVED | Noted: 2017-12-28 | Resolved: 2018-07-12

## 2018-07-12 PROBLEM — R09.81 SINUS CONGESTION: Status: RESOLVED | Noted: 2018-02-23 | Resolved: 2018-07-12

## 2018-07-12 PROCEDURE — G0438 PPPS, INITIAL VISIT: HCPCS | Performed by: NURSE PRACTITIONER

## 2018-07-12 RX ORDER — TRAZODONE HYDROCHLORIDE 100 MG/1
100 TABLET ORAL NIGHTLY
COMMUNITY
End: 2019-03-21 | Stop reason: SDUPTHER

## 2018-07-12 ASSESSMENT — PATIENT HEALTH QUESTIONNAIRE - PHQ9
SUM OF ALL RESPONSES TO PHQ QUESTIONS 1-9: 7
5. POOR APPETITE OR OVEREATING: 0 - NOT AT ALL
CLINICAL INTERPRETATION OF PHQ2 SCORE: 2

## 2018-07-12 ASSESSMENT — ACTIVITIES OF DAILY LIVING (ADL): BATHING_REQUIRES_ASSISTANCE: 0

## 2018-07-12 ASSESSMENT — ENCOUNTER SYMPTOMS: GENERAL WELL-BEING: FAIR

## 2018-07-12 NOTE — ASSESSMENT & PLAN NOTE
This is a chronic condition which is well controlled on current regimen/medication(s): atorvastatin Patient is tolerating medication(s) without significant or bothersome side effects.  Patient is continue current regimen for this condition and is not due for labs

## 2018-07-12 NOTE — ASSESSMENT & PLAN NOTE
This is a chronic condition which is well controlled and only gets them occasional. Will take tylenol as needed

## 2018-07-12 NOTE — TELEPHONE ENCOUNTER
Future Appointments       Provider Department Center    7/12/2018 2:00 PM ALBERTINA SernaRConN.; [facility]          ANNUAL WELLNESS VISIT PRE-VISIT PLANNING WITHOUT OUTREACH    1.  Reviewed note from last office visit with PCP: YES Last office visit 6/14/18    2.  If any orders were placed at last visit, do we have Results/Consult Notes?        •  Labs - Labs ordered, completed on 7/10/18 and results are in chart.  Note: If patient appointment is for lab review and patient did not complete labs, check with provider if OK to reschedule patient until labs completed.       •  Imaging - Imaging was not ordered at last office visit.       •  Referrals - No referrals were ordered at last office visit.    3.  Immunizations were updated in [facility] using WebIZ?: Yes       •  WebIZ Recommendations: SHINGRIX (Shingles)        •  Is patient due for Tdap? NO       •  Is patient due for Shingles? YES. Patient was not notified of copay/out of pocket cost.     4.  Patient is due for the following Health Maintenance Topics:   Health Maintenance Due   Topic Date Due   • Annual Wellness Visit  1950   • MAMMOGRAM  12/15/2017       - Patient plans to schedule appointment for Mammogram.    5.  Reviewed/Updated the following with patient:       •   Preferred Pharmacy? YES       •   Preferred Lab? YES       •   Preferred Communication? YES       •   Allergies? YES       •   Medications? YES. Was Abstract Encounter opened and chart updated? YES       •   Social History? NO       •   Family History (document living status of immediate family members and if + hx of  cancer, diabetes, hypertension, hyperlipidemia, heart attack, stroke) YES. Was Abstract Encounter opened and chart updated? YES    6.  Care Team Updated:       •   DME Company (gait device, O2, CPAP, etc.): NO       •   Other Specialists (eye doctor, derm, GYN, cardiology, endo, etc): YES    7.  Patient has the following Care  Path diagnoses on Problem List:  DEPRESSION     Is patient seeing a counselor, psychiatrist or other healthcare provider regarding their mental health? Yes, CareTeam was updated.    Depression Screen (PHQ-2/PHQ-9) 4/20/2016 6/21/2017 5/22/2018   PHQ-2 Total Score 0 - -   PHQ-2 Total Score - 0 0       Interpretation of PHQ-9 Total Score   Score Severity   1-4 No Depression   5-9 Mild Depression   10-14 Moderate Depression   15-19 Moderately Severe Depression   20-27 Severe Depression      8.  MDX printed and highlighted for Provider? YES    9.  Patient was advised: “This is a free wellness visit. The provider will screen for medical conditions to help you stay healthy. If you have other concerns to address you may be asked to discuss these at a separate visit or there may be an additional fee.”     10.  Patient was informed to arrive 15 min prior to their scheduled appointment and bring in their medication bottles.

## 2018-07-12 NOTE — PROGRESS NOTES
Chief Complaint   Patient presents with   • Annual Wellness Visit         HPI:  Elizabeth is a 67 y.o. here for Medicare Annual Wellness Visit    Dyslipidemia  This is a chronic condition which is well controlled on current regimen/medication(s): atorvastatin Patient is tolerating medication(s) without significant or bothersome side effects.  Patient is continue current regimen for this condition and is not due for labs             Fibromyalgia  This is a chronic condition which is well controlled on current regimen/medication(s): she is followed by pain management.  Patient is tolerating medication(s) without significant or bothersome side effects.        Migraine without aura and without status migrainosus, not intractable  This is a chronic condition which is well controlled and only gets them occasional. Will take tylenol as needed      Hypertension  This is a chronic condition which is well controlled on current regimen/medication(s):  Patient is tolerating medication(s) without significant or bothersome side effects.  Patient is continue current regimen for this condition and is not due for lab. BP today os 118/74            Vitamin D deficiency disease  This is a chronic condition which is well controlled on current regimen/medication(s): OTC vitamin D and calcium. Patient is tolerating medication(s) without significant or bothersome side effects.        Common bile duct (CBD) stricture  This is still going on and has pain associated with it. Start in the 90s. She had this dilated in the past. She will let me know if she needs to be seen again for dilation and will refer to GI    Chronic back pain  This is a chronic condition which is sub-optimally controlled on current regimen/medication(s): followed by pain management, but not on opioid medications. Patient is tolerating medication(s) without significant or bothersome side effects.      Hypothyroidism  This is a chronic condition which is well controlled on  current regimen/medication(s): levothyroxine.  Patient is tolerating medication(s) without significant or bothersome side effects.  Patient is continue current regimen for this condition and is not due for labs             Breast cyst  This is ongoing and had upcoming appt for mammo/US for ongoing surveillance     GERD (gastroesophageal reflux disease)  This is a chronic condition which is well controlled on current regimen/medication(s): PPI.  Patient is tolerating medication(s) without significant or bothersome side effects.          Chronic kidney disease, stage 3  This is a chronic condition, stable, labs have been stable.           Lumbar radiculopathy  See additional notes on chronic back pain    Yeast infection of the skin  This is chronic, stable on as needed antifungals.     Vaginal atrophy  This is chronic, stable. She is not currently taking hormone, she is concerned. She still has pain with intercourse.     Moderate episode of recurrent major depressive disorder (CMS-HCC)  This is a chronic condition which is well controlled on current regimen/medication(s): she is followed by psychiatry.  Patient is tolerating medication(s) without significant or bothersome side effects.          Excessive and redundant skin and subcutaneous tissue  This is chronic, has lost weight in past and had pannus that often will get yeast infection.    Recurrent UTI (urinary tract infection)  Pt has history of recurrent UTI and is followed by urology    Chronic sinusitis  This is chronic, stable. She was seen by ENT and was advised to continue on nasal saline    Anxiety  See additional notes on depression, stable, sees psych    Breast calcification, right  See notes on breast cyst, she has routine mammograms and us        Patient Active Problem List    Diagnosis Date Noted   • Medicare annual wellness visit, initial 07/12/2018   • Breast calcification, right 12/28/2017   • Normocytic anemia 04/17/2017   • Anxiety 04/17/2017   •  Recurrent UTI (urinary tract infection) 03/17/2017   • Chronic sinusitis 03/17/2017   • Moderate episode of recurrent major depressive disorder (HCC) 02/21/2017   • Excessive and redundant skin and subcutaneous tissue 02/21/2017   • Well woman exam with routine gynecological exam 12/02/2016   • Cyst of right breast 12/02/2016   • Dysuria 07/27/2016   • Vaginal atrophy 01/21/2016   • Yeast infection of the skin 06/17/2015   • Lumbar radiculopathy 05/10/2015   • Chronic kidney disease, stage 3 02/05/2015   • Breast cyst 02/04/2015   • GERD (gastroesophageal reflux disease) 02/04/2015   • Hypothyroidism 07/31/2014   • Chronic back pain 03/04/2014   • Common bile duct (CBD) stricture 03/03/2014   • Vitamin D deficiency disease 11/19/2013   • Hypertension 03/06/2013   • Dyslipidemia 03/05/2013   • Fibromyalgia 03/05/2013   • Migraine without aura and without status migrainosus, not intractable 03/05/2013       Current Outpatient Prescriptions   Medication Sig Dispense Refill   • traZODone (DESYREL) 100 MG Tab Take 100 mg by mouth every evening.     • nitrofurantoin monohydr macro (MACROBID) 100 MG Cap Take 1 Cap by mouth every day. 30 Cap 2   • acetaminophen (TYLENOL ARTHRITIS PAIN) 650 MG CR tablet Take 650 mg by mouth every 8 hours.     • gabapentin (NEURONTIN) 800 MG tablet Take 1 Tab by mouth 3 times a day. 270 Tab 1   • atorvastatin (LIPITOR) 40 MG Tab TAKE ONE TABLET BY MOUTH ONCE DAILY 90 Tab 1   • levothyroxine (SYNTHROID) 88 MCG Tab TAKE ONE TABLET BY MOUTH ONCE DAILY 90 Tab 3   • buPROPion SR (WELLBUTRIN SR) 100 MG TABLET SR 12 HR Take 100 mg by mouth 2 times a day.     • magnesium chloride SR (SLO-MAG) 535 (64 Mg) MG Tab CR Take 535 mg by mouth every day.     • lisinopril-hydrochlorothiazide (PRINZIDE, ZESTORETIC) 20-12.5 MG per tablet TAKE 1 TABLET DAILY 90 Tab 3   • citalopram (CELEXA) 20 MG Tab Take 20 mg by mouth every day.     • clonazepam (KLONOPIN) 1 MG Tab Take 0.5 mg by mouth 2 times a day. 0.5mg in  am, 0.5mg 1400, 1mg at bedtime.      • omeprazole (PRILOSEC) 20 MG delayed-release capsule Take 20 mg by mouth 2 times a day.     • topiramate (TOPAMAX) 100 MG Tab Take 1 Tab by mouth 2 times a day. 60 Tab 4   • CALCIUM-VITAMIN D PO Take  by mouth.       No current facility-administered medications for this visit.         Patient is taking medications as noted in medication list.  Current supplements as per medication list.     Allergies: Ampicillin and Latex    Current social contact/activities: Sikh, Bible Study, Family, Friends     Is patient current with immunizations? Yes.    She  reports that she has never smoked. She has never used smokeless tobacco. She reports that she drinks alcohol. She reports that she does not use drugs.  Counseling given: Not Answered        DPA/Advanced directive: Patient does not have an Advanced Directive.  A packet and workshop information was given on Advanced Directives.    ROS:    Gait: Uses no assistive device   Ostomy: No   Other tubes: No   Amputations: No   Chronic oxygen use No   Last eye exam 7/2017  Wears hearing aids: Yes  : Reports urinary leakage during the last 6 months that has somewhat interfered with their daily activities or sleep.      Screening:        Depression Screening    Little interest or pleasure in doing things?  1 - several days  Feeling down, depressed, or hopeless? 1 - several days  Trouble falling or staying asleep, or sleeping too much?  2 - more than half the days  Feeling tired or having little energy?  3 - nearly every day  Poor appetite or overeating?  0 - not at all  Feeling bad about yourself - or that you are a failure or have let yourself or your family down? 0 - not at all  Trouble concentrating on things, such as reading the newspaper or watching television? 0 - not at all  Moving or speaking so slowly that other people could have noticed.  Or the opposite - being so fidgety or restless that you have been moving around a lot more than  usual?  0 - not at all  Thoughts that you would be better off dead, or of hurting yourself?  0 - not at all  Patient Health Questionnaire Score: 7      If depressive symptoms identified deferred to follow up visit unless specifically addressed in assessment and plan.    Interpretation of PHQ-9 Total Score   Score Severity   1-4 No Depression   5-9 Mild Depression   10-14 Moderate Depression   15-19 Moderately Severe Depression   20-27 Severe Depression      Screening for Cognitive Impairment    Three Minute Recall (leader, season, table)  3/3    Draw clock face with all 12 numbers and set the hands to show 10 past 11.  Yes    If cognitive concerns identified, deferred for follow up unless specifically addressed in assessment and plan.    Fall Risk Assessment    Has the patient had two or more falls in the last year or any fall with injury in the last year?  No  If fall risk identified, deferred for follow up unless specifically addressed in assessment and plan.      Safety Assessment    Throw rugs on floor.  Yes  Handrails on all stairs.  No  Good lighting in all hallways.  Yes  Difficulty hearing.  No  Patient counseled about all safety risks that were identified.    Functional Assessment ADLs    Are there any barriers preventing you from cooking for yourself or meeting nutritional needs?  No.    Are there any barriers preventing you from driving safely or obtaining transportation?  No.    Are there any barriers preventing you from using a telephone or calling for help?  No.    Are there any barriers preventing you from shopping?  No.    Are there any barriers preventing you from taking care of your own finances?  No.    Are there any barriers preventing you from managing your medications?    No.    Are there any barriers preventing you from showering, bathing or dressing yourself?  No.    Are you currently engaging in any exercise or physical activity?  No.     What is your perception of your  health?  Fair.    Health Maintenance Summary                Annual Wellness Visit Overdue 1950     MAMMOGRAM Overdue 12/15/2017      Done 12/15/2016 MA DIAGNOSTIC MAMMO W/CAD-BILAT     Patient has more history with this topic...    IMM INFLUENZA Next Due 9/1/2018      Done 12/28/2017 Imm Admin: Influenza Vaccine Adult HD     Patient has more history with this topic...    IMM PNEUMOCOCCAL 65+ (ADULT) LOW/MEDIUM RISK SERIES Next Due 12/28/2018      Done 12/28/2017 Imm Admin: Pneumococcal Conjugate Vaccine (Prevnar/PCV-13)    PAP SMEAR Next Due 12/2/2019      Done 12/2/2016 PATHOLOGY GYN SPECIMEN     Patient has more history with this topic...    BONE DENSITY Next Due 1/18/2023      Done 1/18/2018 DS-BONE DENSITY STUDY (DEXA)    COLONOSCOPY Next Due 10/8/2024      Done 10/8/2014 AMB REFERRAL TO GI FOR COLONOSCOPY    IMM DTaP/Tdap/Td Vaccine Next Due 12/28/2027      Done 12/28/2017 Imm Admin: Tdap Vaccine          Patient Care Team:  TERRI Seran as PCP - General (Family Medicine)  Nadiya Almeida P.A.-C. as Consulting Physician (Surgery)  Gianni Dutta M.D. as Consulting Physician (Psychiatry)      Social History   Substance Use Topics   • Smoking status: Never Smoker   • Smokeless tobacco: Never Used   • Alcohol use 0.0 oz/week     Family History   Problem Relation Age of Onset   • Cancer Mother      BREAST   • Hypertension Mother    • Hyperlipidemia Mother    • Hypertension Father    • Hyperlipidemia Father    • Cancer Father    • No Known Problems Brother    • Heart Disease Maternal Grandmother    • Hypertension Maternal Grandmother    • Heart Disease Maternal Grandfather    • No Known Problems Paternal Grandmother    • Heart Disease Paternal Grandfather    • Stroke Paternal Grandfather      She  has a past medical history of Anxiety; Common bile duct (CBD) stricture; Degenerative joint disease; Depression; Fibromyalgia; History of mammogram; Hyperlipidemia; Hypertension; Pap test, as part of  "routine gynecological examination; Post concussive syndrome; PTSD (post-traumatic stress disorder); S/P colonoscopy; and Skin cancer.   Past Surgical History:   Procedure Laterality Date   • HIP CANNULATED SCREW Right 4/17/2017    Procedure: HIP CANNULATED SCREW;  Surgeon: Dhaval Poole M.D.;  Location: SURGERY [facility];  Service:    • ESTEFANÍA BY LAPAROSCOPY     • EGD W/ENDOSCOPIC ULTRASOUND      1990s, common bile duct stricture, no stent         Exam:     Blood pressure 118/74, pulse 76, temperature 37.2 °C (99 °F), resp. rate 16, height 1.626 m (5' 4\"), weight 68.5 kg (151 lb), SpO2 98 %. Body mass index is 25.92 kg/m².    Hearing fair, wears hearing aids.    Dentition good  Alert, oriented in no acute distress.  Eye contact is good, speech goal directed, affect calm      Assessment and Plan. The following treatment and monitoring plan is recommended:  Continue meds as prescribed and see your specialists and PCP  1. Medicare annual wellness visit, initial     2. Dyslipidemia     3. Fibromyalgia     4. Migraine without aura and without status migrainosus, not intractable     5. Essential hypertension     6. Vitamin D deficiency disease     7. Common bile duct (CBD) stricture     8. Chronic midline low back pain with sciatica, sciatica laterality unspecified     9. Hypothyroidism, unspecified type     10. Cyst of right breast     11. Gastroesophageal reflux disease, esophagitis presence not specified     12. Chronic kidney disease, stage 3     13. Lumbar radiculopathy     14. Yeast infection of the skin     15. Vaginal atrophy     16. Dysuria     17. Well woman exam with routine gynecological exam     18. Moderate episode of recurrent major depressive disorder (HCC)     19. Excessive and redundant skin and subcutaneous tissue     20. Recurrent UTI (urinary tract infection)     21. Chronic sinusitis, unspecified location     22. Normocytic anemia     23. Anxiety     24. Breast calcification, right   "         Services suggested: No services needed at this time  Health Care Screening recommendations as per orders if indicated.  Referrals offered: PT/OT/Nutrition counseling/Behavioral Health/Smoking cessation as per orders if indicated.    Discussion today about general wellness and lifestyle habits:    · Prevent falls and reduce trip hazards; Cautioned about securing or removing rugs.  · Have a working fire alarm and carbon monoxide detector;   · Engage in regular physical activity and social activities       Follow-up: No Follow-up on file.    **This note was authored by LARISSA Ewing**

## 2018-07-12 NOTE — ASSESSMENT & PLAN NOTE
This is chronic, stable. She is not currently taking hormone, she is concerned. She still has pain with intercourse.

## 2018-07-12 NOTE — ASSESSMENT & PLAN NOTE
This is a chronic condition which is well controlled on current regimen/medication(s): she is followed by psychiatry.  Patient is tolerating medication(s) without significant or bothersome side effects.

## 2018-07-12 NOTE — ASSESSMENT & PLAN NOTE
This is still going on and has pain associated with it. Start in the 90s. She had this dilated in the past. She will let me know if she needs to be seen again for dilation and will refer to GI

## 2018-07-12 NOTE — ASSESSMENT & PLAN NOTE
This is a chronic condition which is well controlled on current regimen/medication(s): OTC vitamin D and calcium. Patient is tolerating medication(s) without significant or bothersome side effects.

## 2018-07-12 NOTE — ASSESSMENT & PLAN NOTE
This is a chronic condition which is sub-optimally controlled on current regimen/medication(s): followed by pain management, but not on opioid medications. Patient is tolerating medication(s) without significant or bothersome side effects.

## 2018-07-12 NOTE — ASSESSMENT & PLAN NOTE
This is a chronic condition which is well controlled on current regimen/medication(s):  Patient is tolerating medication(s) without significant or bothersome side effects.  Patient is continue current regimen for this condition and is not due for lab. BP today os 118/74

## 2018-07-12 NOTE — ASSESSMENT & PLAN NOTE
This is a chronic condition which is well controlled on current regimen/medication(s): she is followed by pain management.  Patient is tolerating medication(s) without significant or bothersome side effects.

## 2018-07-12 NOTE — ASSESSMENT & PLAN NOTE
This is a chronic condition which is well controlled on current regimen/medication(s): levothyroxine.  Patient is tolerating medication(s) without significant or bothersome side effects.  Patient is continue current regimen for this condition and is not due for labs

## 2018-07-12 NOTE — ASSESSMENT & PLAN NOTE
This is a chronic condition which is well controlled on current regimen/medication(s): PPI.  Patient is tolerating medication(s) without significant or bothersome side effects.

## 2018-08-08 DIAGNOSIS — E78.00 PURE HYPERCHOLESTEROLEMIA: ICD-10-CM

## 2018-08-09 RX ORDER — ATORVASTATIN CALCIUM 40 MG/1
TABLET, FILM COATED ORAL
Qty: 90 TAB | Refills: 1 | Status: SHIPPED | OUTPATIENT
Start: 2018-08-09 | End: 2019-02-10 | Stop reason: SDUPTHER

## 2018-08-09 NOTE — TELEPHONE ENCOUNTER
Pt has had OV within the 12 month protocol and lipid panel is current. 6 month supply sent to pharmacy.   Lab Results   Component Value Date/Time    CHOLSTRLTOT 176 07/10/2018 08:47 AM    LDL 92 07/10/2018 08:47 AM    HDL 58 07/10/2018 08:47 AM    TRIGLYCERIDE 129 07/10/2018 08:47 AM       Lab Results   Component Value Date/Time    SODIUM 137 07/10/2018 08:47 AM    POTASSIUM 4.1 07/10/2018 08:47 AM    CHLORIDE 104 07/10/2018 08:47 AM    CO2 27 07/10/2018 08:47 AM    GLUCOSE 88 07/10/2018 08:47 AM    BUN 23 (H) 07/10/2018 08:47 AM    CREATININE 1.04 07/10/2018 08:47 AM    CREATININE 0.8 02/27/2009 02:02 PM     Lab Results   Component Value Date/Time    ALKPHOSPHAT 68 07/10/2018 08:47 AM    ASTSGOT 12 07/10/2018 08:47 AM    ALTSGPT 16 07/10/2018 08:47 AM    TBILIRUBIN 0.3 07/10/2018 08:47 AM

## 2018-08-28 DIAGNOSIS — M79.7 FIBROMYALGIA: ICD-10-CM

## 2018-08-29 RX ORDER — GABAPENTIN 800 MG/1
TABLET ORAL
Qty: 270 TAB | Refills: 1 | Status: SHIPPED | OUTPATIENT
Start: 2018-08-29 | End: 2019-03-27 | Stop reason: SDUPTHER

## 2018-10-23 DIAGNOSIS — F41.9 ANXIETY: ICD-10-CM

## 2018-10-23 DIAGNOSIS — F33.1 MODERATE EPISODE OF RECURRENT MAJOR DEPRESSIVE DISORDER (HCC): ICD-10-CM

## 2018-10-23 RX ORDER — CLONAZEPAM 1 MG/1
TABLET ORAL
Qty: 60 TAB | Refills: 0 | Status: SHIPPED
Start: 2018-10-23 | End: 2018-11-22

## 2018-10-23 RX ORDER — CLONAZEPAM 1 MG/1
0.5 TABLET ORAL 2 TIMES DAILY
Qty: 60 TAB | Status: CANCELLED
Start: 2018-10-23

## 2018-10-23 NOTE — TELEPHONE ENCOUNTER
Was the patient seen in the last year in this department? Yes    Does patient have an active prescription for medications requested? No     Received Request Via: Patient     Patient called and stated that her psychiatrist who normally prescribes this has closed their office. She is currently in the process of trying to find a new provider. She would like to know if you would be willing to fill this for her in the mean time.

## 2018-11-10 DIAGNOSIS — R92.8 ABNORMAL MAMMOGRAM: ICD-10-CM

## 2018-12-11 DIAGNOSIS — E03.9 HYPOTHYROIDISM, UNSPECIFIED TYPE: ICD-10-CM

## 2018-12-12 ENCOUNTER — HOSPITAL ENCOUNTER (OUTPATIENT)
Dept: RADIOLOGY | Facility: MEDICAL CENTER | Age: 68
End: 2018-12-12
Attending: NURSE PRACTITIONER
Payer: MEDICARE

## 2018-12-12 DIAGNOSIS — R92.8 ABNORMAL MAMMOGRAM: ICD-10-CM

## 2018-12-12 PROCEDURE — G0279 TOMOSYNTHESIS, MAMMO: HCPCS

## 2018-12-17 ENCOUNTER — HOSPITAL ENCOUNTER (OUTPATIENT)
Dept: LAB | Facility: MEDICAL CENTER | Age: 68
End: 2018-12-17
Attending: NURSE PRACTITIONER
Payer: MEDICARE

## 2018-12-17 DIAGNOSIS — E03.9 HYPOTHYROIDISM, UNSPECIFIED TYPE: ICD-10-CM

## 2018-12-17 LAB — TSH SERPL DL<=0.005 MIU/L-ACNC: 0.65 UIU/ML (ref 0.38–5.33)

## 2018-12-17 PROCEDURE — 84443 ASSAY THYROID STIM HORMONE: CPT

## 2018-12-17 PROCEDURE — 36415 COLL VENOUS BLD VENIPUNCTURE: CPT

## 2018-12-29 DIAGNOSIS — I10 ESSENTIAL HYPERTENSION: ICD-10-CM

## 2018-12-31 NOTE — TELEPHONE ENCOUNTER
Was the patient seen in the last year in this department? Yes    Does patient have an active prescription for medications requested? No     Received Request Via: Pharmacy      Pt met protocol?: Yes  pt last ov 7/2018   BP Readings from Last 1 Encounters:   07/12/18 118/74

## 2019-01-01 RX ORDER — LISINOPRIL AND HYDROCHLOROTHIAZIDE 20; 12.5 MG/1; MG/1
TABLET ORAL
Qty: 90 TAB | Refills: 1 | Status: SHIPPED | OUTPATIENT
Start: 2019-01-01 | End: 2019-04-18 | Stop reason: SDUPTHER

## 2019-01-01 NOTE — TELEPHONE ENCOUNTER
Refill X 6 months, sent to pharmacy.Pt. Seen in the last 6 months per protocol.   Lab Results   Component Value Date/Time    SODIUM 137 07/10/2018 08:47 AM    POTASSIUM 4.1 07/10/2018 08:47 AM    CHLORIDE 104 07/10/2018 08:47 AM    CO2 27 07/10/2018 08:47 AM    GLUCOSE 88 07/10/2018 08:47 AM    BUN 23 (H) 07/10/2018 08:47 AM    CREATININE 1.04 07/10/2018 08:47 AM    CREATININE 0.8 02/27/2009 02:02 PM       BP Readings from Last 1 Encounters:   07/12/18 118/74

## 2019-01-14 ENCOUNTER — OFFICE VISIT (OUTPATIENT)
Dept: MEDICAL GROUP | Facility: PHYSICIAN GROUP | Age: 69
End: 2019-01-14
Payer: MEDICARE

## 2019-01-14 ENCOUNTER — HOSPITAL ENCOUNTER (OUTPATIENT)
Dept: LAB | Facility: MEDICAL CENTER | Age: 69
End: 2019-01-14
Attending: NURSE PRACTITIONER
Payer: MEDICARE

## 2019-01-14 VITALS
BODY MASS INDEX: 26.46 KG/M2 | TEMPERATURE: 98.3 F | WEIGHT: 155 LBS | DIASTOLIC BLOOD PRESSURE: 62 MMHG | RESPIRATION RATE: 16 BRPM | OXYGEN SATURATION: 96 % | SYSTOLIC BLOOD PRESSURE: 110 MMHG | HEART RATE: 72 BPM | HEIGHT: 64 IN

## 2019-01-14 DIAGNOSIS — E03.9 HYPOTHYROIDISM, UNSPECIFIED TYPE: ICD-10-CM

## 2019-01-14 DIAGNOSIS — M79.7 FIBROMYALGIA: ICD-10-CM

## 2019-01-14 DIAGNOSIS — Z23 NEED FOR INFLUENZA VACCINATION: ICD-10-CM

## 2019-01-14 DIAGNOSIS — M25.531 PAIN OF BOTH WRIST JOINTS: ICD-10-CM

## 2019-01-14 DIAGNOSIS — I10 ESSENTIAL HYPERTENSION: ICD-10-CM

## 2019-01-14 DIAGNOSIS — N18.30 CHRONIC KIDNEY DISEASE, STAGE 3 (HCC): ICD-10-CM

## 2019-01-14 DIAGNOSIS — M25.532 PAIN OF BOTH WRIST JOINTS: ICD-10-CM

## 2019-01-14 PROCEDURE — G0008 ADMIN INFLUENZA VIRUS VAC: HCPCS | Performed by: NURSE PRACTITIONER

## 2019-01-14 PROCEDURE — 99214 OFFICE O/P EST MOD 30 MIN: CPT | Mod: 25 | Performed by: NURSE PRACTITIONER

## 2019-01-14 PROCEDURE — 84439 ASSAY OF FREE THYROXINE: CPT

## 2019-01-14 PROCEDURE — 36415 COLL VENOUS BLD VENIPUNCTURE: CPT

## 2019-01-14 PROCEDURE — 90662 IIV NO PRSV INCREASED AG IM: CPT | Performed by: NURSE PRACTITIONER

## 2019-01-14 PROCEDURE — 84481 FREE ASSAY (FT-3): CPT

## 2019-01-14 RX ORDER — CLONAZEPAM 1 MG/1
1 TABLET ORAL DAILY
COMMUNITY
End: 2019-09-03

## 2019-01-14 RX ORDER — BUPROPION HYDROCHLORIDE 150 MG/1
150 TABLET, EXTENDED RELEASE ORAL 2 TIMES DAILY
COMMUNITY
End: 2019-05-06 | Stop reason: SDUPTHER

## 2019-01-14 NOTE — PROGRESS NOTES
Chief Complaint   Patient presents with   • Hypertension     6 month fv         This is a 68 y.o.female patient that presents today with the following: Follow-up, review labs    Fibromyalgia  This is a chronic condition, stable and fairly well controlled with current regimen and she is closely followed by pain management.  She also has some concerns regarding her thyroid and has been reading about this online and came across an article that discussed fibromyalgia and hypothyroidism.  She does have this and is on levothyroxine but would like further testing including her free T4 and T3, this is reasonable.  She does continue to have issues with hair loss and skin changes and cold intolerance.    Hypothyroidism  See additional notes, additional labs have been ordered.    Chronic kidney disease, stage 3  This is a chronic condition, stable.  We discussed the importance of avoiding nephrotoxic medications, staying adequately hydrated, avoiding excessive salt in her diet and keeping blood pressure under good control.  We will check labs again in 6 months.    Pain of both wrist joints  Patient has pain lateral wrist especially at the MCP at the base of her bilateral thumbs..  She does believe this to be osteoarthritic in nature and does continue to take over-the-counter analgesics however she would like to see specialist because she is also developed a small lump in the volar aspect of her right wrist.  Referral has been placed to sports medicine for further evaluation and treatment.    Hypertension  This is chronic and stable, blood pressure well within normal limits.  She is on lisinopril hydrochlorothiazide and tolerates this medication well with no significant or bothersome side effects.  She does deny signs and symptoms of hypertension.      Hospital Outpatient Visit on 12/17/2018   Component Date Value   • TSH 12/17/2018 0.650          clinical course has been stable    Past Medical History:   Diagnosis Date   •  Anxiety    • Common bile duct (CBD) stricture     hx of in 1990s   • Degenerative joint disease     bulging discs in C-spine, L-spine   • Depression     sees Psych in [place]   • Fibromyalgia    • History of mammogram     9/11, diagnostic showed breast cyst, rec 1 year   • Hyperlipidemia    • Hypertension    • Pap test, as part of routine gynecological examination     last 2008, no abn history   • Post concussive syndrome     after fall in 1999   • PTSD (post-traumatic stress disorder)    • S/P colonoscopy     nl at age 52, due 2013   • Skin cancer     SCC       Past Surgical History:   Procedure Laterality Date   • HIP CANNULATED SCREW Right 4/17/2017    Procedure: HIP CANNULATED SCREW;  Surgeon: Dhaval Poole M.D.;  Location: SURGERY [facility];  Service:    • ESTEFANÍA BY LAPAROSCOPY     • EGD W/ENDOSCOPIC ULTRASOUND      1990s, common bile duct stricture, no stent       Family History   Problem Relation Age of Onset   • Cancer Mother         BREAST   • Hypertension Mother    • Hyperlipidemia Mother    • Hypertension Father    • Hyperlipidemia Father    • Cancer Father    • No Known Problems Brother    • Heart Disease Maternal Grandmother    • Hypertension Maternal Grandmother    • Heart Disease Maternal Grandfather    • No Known Problems Paternal Grandmother    • Heart Disease Paternal Grandfather    • Stroke Paternal Grandfather        Ampicillin and Latex    Current Outpatient Prescriptions Ordered in [facility]   Medication Sig Dispense Refill   • clonazePAM (KLONOPIN) 1 MG Tab Take 1 mg by mouth every day. Take 0.25mg to 1 mg once daily (2 days on/1 day off)     • buPROPion SR (WELLBUTRIN SR) 150 MG TABLET SR 12 HR sustained-release tablet Take 150 mg by mouth 2 times a day.     • lisinopril-hydrochlorothiazide (PRINZIDE, ZESTORETIC) 20-12.5 MG per tablet TAKE ONE TABLET BY MOUTH ONCE DAILY 90 Tab 1   • gabapentin (NEURONTIN) 800 MG tablet TAKE 1 TABLET BY MOUTH THREE TIMES DAILY 270 Tab 1   • atorvastatin  "(LIPITOR) 40 MG Tab TAKE ONE TABLET BY MOUTH ONCE DAILY 90 Tab 1   • traZODone (DESYREL) 100 MG Tab Take 100 mg by mouth every evening.     • acetaminophen (TYLENOL ARTHRITIS PAIN) 650 MG CR tablet Take 650 mg by mouth every 8 hours.     • citalopram (CELEXA) 20 MG Tab Take 20 mg by mouth every day.     • omeprazole (PRILOSEC) 20 MG delayed-release capsule Take 20 mg by mouth 2 times a day.       No current [facility]-ordered facility-administered medications on file.        Constitutional ROS: No unexpected change in weight, No weakness, No unexplained fevers, sweats, or chills  Pulmonary ROS: No chronic cough, sputum, or hemoptysis, No shortness of breath, No recent change in breathing  Cardiovascular ROS: No chest pain, No edema, No palpitations, Positive for hyperlipidemia  Gastrointestinal ROS: No abdominal pain, No nausea, vomiting, diarrhea, or constipation  Musculoskeletal/Extremities ROS: Positive for fibromyalgia  Skin/Integumentary ROS: Positive for hearing changes  Neurologic ROS: Normal development, No seizures, No weakness  Psychiatric ROS: Positive for depression  Endocrine ROS: Positive per HPI    Physical exam:  /62 (BP Location: Right arm, Patient Position: Sitting, BP Cuff Size: Adult)   Pulse 72   Temp 36.8 °C (98.3 °F) (Temporal)   Resp 16   Ht 1.626 m (5' 4\")   Wt 70.3 kg (155 lb)   SpO2 96%   BMI 26.61 kg/m²   General Appearance: Older female, alert, no distress, well-nourished, well-groomed  Skin: Skin color, texture, turgor normal. No rashes or lesions.  Lungs: negative findings: normal respiratory rate and rhythm, lungs clear to auscultation  Heart: negative. RRR without murmur, gallop, or rubs.  No ectopy.  Abdomen: Abdomen soft, non-tender. BS normal. No masses,  No organomegaly  Musculoskeletal: negative findings: no evidence of joint instability, no evidence of muscle atrophy, no deformities present  Neurologic: intact    Medical decision making/discussion: Patient has been " referred to sports medicine for further evaluation of bilateral wrist and hand pain.  Labs been ordered to further evaluate thyroid.  She will get influenza immunization today.  She is otherwise to follow-up with me in 6 months with routine fasting labs done before visit.    Elizabeth was seen today for hypertension.    Diagnoses and all orders for this visit:    Hypothyroidism, unspecified type  -     T3 FREE; Future  -     FREE THYROXINE; Future    Chronic kidney disease, stage 3 (HCC)    Fibromyalgia    Pain of both wrist joints  -     REFERRAL TO SPORTS MEDICINE    Need for influenza vaccination  -     INFLUENZA VACCINE, HIGH DOSE (65+ ONLY)    Essential hypertension          Please note that this dictation was created using voice recognition software. I have made every reasonable attempt to correct obvious errors, but I expect that there are errors of grammar and possibly content that I did not discover before finalizing the note.

## 2019-01-15 PROBLEM — M25.531 PAIN OF BOTH WRIST JOINTS: Status: ACTIVE | Noted: 2019-01-15

## 2019-01-15 PROBLEM — M25.532 PAIN OF BOTH WRIST JOINTS: Status: ACTIVE | Noted: 2019-01-15

## 2019-01-15 LAB
T3FREE SERPL-MCNC: 3.37 PG/ML (ref 2.4–4.2)
T4 FREE SERPL-MCNC: 1.16 NG/DL (ref 0.53–1.43)

## 2019-01-15 NOTE — ASSESSMENT & PLAN NOTE
Patient has pain lateral wrist especially at the MCP at the base of her bilateral thumbs..  She does believe this to be osteoarthritic in nature and does continue to take over-the-counter analgesics however she would like to see specialist because she is also developed a small lump in the volar aspect of her right wrist.  Referral has been placed to sports medicine for further evaluation and treatment.

## 2019-01-15 NOTE — ASSESSMENT & PLAN NOTE
This is a chronic condition, stable and fairly well controlled with current regimen and she is closely followed by pain management.  She also has some concerns regarding her thyroid and has been reading about this online and came across an article that discussed fibromyalgia and hypothyroidism.  She does have this and is on levothyroxine but would like further testing including her free T4 and T3, this is reasonable.  She does continue to have issues with hair loss and skin changes and cold intolerance.

## 2019-01-15 NOTE — ASSESSMENT & PLAN NOTE
This is chronic and stable, blood pressure well within normal limits.  She is on lisinopril hydrochlorothiazide and tolerates this medication well with no significant or bothersome side effects.  She does deny signs and symptoms of hypertension.

## 2019-01-15 NOTE — ASSESSMENT & PLAN NOTE
This is a chronic condition, stable.  We discussed the importance of avoiding nephrotoxic medications, staying adequately hydrated, avoiding excessive salt in her diet and keeping blood pressure under good control.  We will check labs again in 6 months.

## 2019-01-21 DIAGNOSIS — E03.9 HYPOTHYROIDISM, UNSPECIFIED TYPE: ICD-10-CM

## 2019-01-21 RX ORDER — LEVOTHYROXINE SODIUM 88 UG/1
TABLET ORAL
Refills: 0 | Status: CANCELLED | OUTPATIENT
Start: 2019-01-21

## 2019-01-21 NOTE — TELEPHONE ENCOUNTER
Was the patient seen in the last year in this department? Yes    Does patient have an active prescription for medications requested? No     Received Request Via: Pharmacy      Pt met protocol?: Yes, OV last week, TSH checked 12/18 (within range), rx d/c'd 1/14/19, completion?

## 2019-01-22 DIAGNOSIS — E03.9 HYPOTHYROIDISM, UNSPECIFIED TYPE: ICD-10-CM

## 2019-01-22 RX ORDER — LEVOTHYROXINE SODIUM 88 UG/1
TABLET ORAL
Qty: 90 TAB | Refills: 3 | Status: SHIPPED | OUTPATIENT
Start: 2019-01-22 | End: 2020-01-28

## 2019-01-22 NOTE — TELEPHONE ENCOUNTER
Elizabeth- You d/c'd med at last OV 1/19. Please refill as you see fit. Last TSH was WNL at 0.65.

## 2019-01-31 DIAGNOSIS — M62.838 MUSCLE SPASM: ICD-10-CM

## 2019-01-31 RX ORDER — CYCLOBENZAPRINE HCL 10 MG
10 TABLET ORAL 3 TIMES DAILY PRN
Qty: 30 TAB | Refills: 0 | Status: SHIPPED | OUTPATIENT
Start: 2019-01-31 | End: 2019-09-03

## 2019-02-01 ENCOUNTER — APPOINTMENT (OUTPATIENT)
Dept: RADIOLOGY | Facility: IMAGING CENTER | Age: 69
End: 2019-02-01
Attending: FAMILY MEDICINE
Payer: MEDICARE

## 2019-02-01 ENCOUNTER — OFFICE VISIT (OUTPATIENT)
Dept: MEDICAL GROUP | Facility: CLINIC | Age: 69
End: 2019-02-01
Payer: MEDICARE

## 2019-02-01 VITALS
BODY MASS INDEX: 26.46 KG/M2 | SYSTOLIC BLOOD PRESSURE: 116 MMHG | RESPIRATION RATE: 16 BRPM | HEART RATE: 76 BPM | DIASTOLIC BLOOD PRESSURE: 72 MMHG | WEIGHT: 155 LBS | OXYGEN SATURATION: 97 % | HEIGHT: 64 IN | TEMPERATURE: 98.2 F

## 2019-02-01 DIAGNOSIS — M65.4 DE QUERVAIN'S TENOSYNOVITIS, RIGHT: ICD-10-CM

## 2019-02-01 DIAGNOSIS — S63.8X9A: ICD-10-CM

## 2019-02-01 DIAGNOSIS — M18.0 OSTEOARTHRITIS OF CARPOMETACARPAL (CMC) JOINT OF BOTH THUMBS: ICD-10-CM

## 2019-02-01 PROCEDURE — 73130 X-RAY EXAM OF HAND: CPT | Mod: TC,RT | Performed by: FAMILY MEDICINE

## 2019-02-01 PROCEDURE — 99203 OFFICE O/P NEW LOW 30 MIN: CPT | Performed by: FAMILY MEDICINE

## 2019-02-01 PROCEDURE — 73130 X-RAY EXAM OF HAND: CPT | Mod: 26,LT,76 | Performed by: FAMILY MEDICINE

## 2019-02-01 ASSESSMENT — ENCOUNTER SYMPTOMS
CHILLS: 0
DIZZINESS: 0
HEADACHES: 0
FEVER: 0
SHORTNESS OF BREATH: 0
VOMITING: 0
NAUSEA: 0

## 2019-02-01 NOTE — PROGRESS NOTES
Subjective:      Elizabeth Murphy is a 68 y.o. female who presents with Wrist Pain (Referral from PCP/ Bilateral wrist pain )     Referred by TERRI Serna for evaluation of BILATERAL base of thumb pain    HPI   BILATERAL base of thumb pain (right-hand-dominant) RIGHT is worse than the left  Insidious onset without injury, symptoms began a few months ago  Getting worse  Intense achy pain at the base of the thumbs  Difficulty opening jars and grasping objects  Pain stays localized at the base of the thumb, the RIGHT side does affect the region of the distal radius/radial side of the wrist  Improved with icing  Difficulty and pain is worse with gripping  Denies any prior issues with the wrists or thumbs in the past  Taking Tylenol for pain which does not help much  Able to sleep through the night, but she does have pain if she wakes up for other reason  Seen by PCP, imaging has not been performed    Review of Systems   Constitutional: Negative for chills and fever.   Respiratory: Negative for shortness of breath.    Cardiovascular: Negative for chest pain.   Gastrointestinal: Negative for nausea and vomiting.   Neurological: Negative for dizziness and headaches.     PMH:  has a past medical history of Anxiety; Common bile duct (CBD) stricture; Degenerative joint disease; Depression; Fibromyalgia; History of mammogram; Hyperlipidemia; Hypertension; Pap test, as part of routine gynecological examination; Post concussive syndrome; PTSD (post-traumatic stress disorder); S/P colonoscopy; and Skin cancer.  MEDS:   Current Outpatient Prescriptions:   •  cyclobenzaprine (FLEXERIL) 10 MG Tab, Take 1 Tab by mouth 3 times a day as needed., Disp: 30 Tab, Rfl: 0  •  levothyroxine (SYNTHROID) 88 MCG Tab, TAKE ONE TABLET BY MOUTH ONCE DAILY, Disp: 90 Tab, Rfl: 3  •  buPROPion SR (WELLBUTRIN SR) 150 MG TABLET SR 12 HR sustained-release tablet, Take 150 mg by mouth 2 times a day., Disp: , Rfl:   •   "lisinopril-hydrochlorothiazide (PRINZIDE, ZESTORETIC) 20-12.5 MG per tablet, TAKE ONE TABLET BY MOUTH ONCE DAILY, Disp: 90 Tab, Rfl: 1  •  gabapentin (NEURONTIN) 800 MG tablet, TAKE 1 TABLET BY MOUTH THREE TIMES DAILY, Disp: 270 Tab, Rfl: 1  •  atorvastatin (LIPITOR) 40 MG Tab, TAKE ONE TABLET BY MOUTH ONCE DAILY, Disp: 90 Tab, Rfl: 1  •  traZODone (DESYREL) 100 MG Tab, Take 100 mg by mouth every evening., Disp: , Rfl:   •  acetaminophen (TYLENOL ARTHRITIS PAIN) 650 MG CR tablet, Take 650 mg by mouth every 8 hours., Disp: , Rfl:   •  citalopram (CELEXA) 20 MG Tab, Take 20 mg by mouth every day., Disp: , Rfl:   •  omeprazole (PRILOSEC) 20 MG delayed-release capsule, Take 20 mg by mouth 2 times a day., Disp: , Rfl:   •  clonazePAM (KLONOPIN) 1 MG Tab, Take 1 mg by mouth every day. Take 0.25mg to 1 mg once daily (2 days on/1 day off), Disp: , Rfl:   ALLERGIES:   Allergies   Allergen Reactions   • Ampicillin Diarrhea     Patient stated.   • Latex Hives     SURGHX:   Past Surgical History:   Procedure Laterality Date   • HIP CANNULATED SCREW Right 4/17/2017    Procedure: HIP CANNULATED SCREW;  Surgeon: Dhaval Poole M.D.;  Location: SURGERY [facility];  Service:    • ESTEFANÍA BY LAPAROSCOPY     • EGD W/ENDOSCOPIC ULTRASOUND      1990s, common bile duct stricture, no stent     SOCHX:  reports that she has never smoked. She has never used smokeless tobacco. She reports that she drinks alcohol. She reports that she does not use drugs.  FH: Family history was reviewed, no pertinent findings to report       Objective:     /72 (BP Location: Left arm, Patient Position: Sitting, BP Cuff Size: Adult)   Pulse 76   Temp 36.8 °C (98.2 °F) (Temporal)   Resp 16   Ht 1.626 m (5' 4\")   Wt 70.3 kg (155 lb)   SpO2 97%   BMI 26.61 kg/m²      Physical Exam     Hand exam    NAD  Alert and oriented    BILATERAL ELBOW exam  Range of motion intact  No swelling  No tenderness the medial or lateral epicondyle  Do test " NEGATIVE    BILATERAL WRIST exam  Range of motion intact  No tenderness along scaphoid, TFCC insertion, distal radius or distal ulna  Tinel's testing is NEGATIVE  The hand is otherwise neurovascularly intact  Finkelstein's POSITIVE on the RIGHT compared to left with POSITIVE tenderness along the abductor pollicis longus tendon sheath in the RIGHT    BILATERAL hand exam  NO swelling  NO deformity  Range of motion of all MCP, DIP and PIP joints NORMAL  Pinky opposition NORMAL  Grind test is POSITIVE BILATERALLY POSITIVE tenderness at the CMC joint BILATERALLY  Since test is POSITIVE on the RIGHT compared to the left  Collateral ligament testing is NORMAL       Assessment/Plan:     1. Osteoarthritis of carpometacarpal (CMC) joint of both thumbs  DX-HAND 3+ RIGHT    DX-HAND 3+ LEFT   2. De Quervain's tenosynovitis, right       She would like to proceed with ultrasound-guided corticosteroid injection of BOTH CMC joints  Depending on how she responds to that, we may consider a RIGHT de Quervain's corticosteroid injection if she continues to have symptoms        2/1/2019 2:16 PM    HISTORY/REASON FOR EXAM:  Left hand pain.    TECHNIQUE/EXAM DESCRIPTION AND NUMBER OF VIEWS:  3 views of the LEFT hand.    COMPARISON: None.    FINDINGS:    BONE MINERALIZATION: Normal.  JOINTS: Mild to moderate STT and CMC-1 osteoarthrosis. Mild multifocal interphalangeal osteoarthrosis. No erosions.  FRACTURE: None.  DISLOCATION: None.  SOFT TISSUES: No mass.   Impression       1.  Mild to moderate STT and CMC-1 osteoarthrosis. Mild multifocal interphalangeal osteoarthrosis.  2.  No acute osseous abnormality.           2/1/2019 2:13 PM    HISTORY/REASON FOR EXAM:  Right hand pain.    TECHNIQUE/EXAM DESCRIPTION AND NUMBER OF VIEWS:  3 views of the RIGHT hand.    COMPARISON: 4/16/2017    FINDINGS:    BONE MINERALIZATION: Normal.  JOINTS: Mild multifocal osteoarthrosis. No erosions.  FRACTURE: None.  DISLOCATION: None.  SOFT TISSUES: No mass.      Impression       Mild multifocal osteoarthrosis. No acute osseous abnormality.     Interpreted in the office today with the patient    Thank you TERRI Serna for allowing me to participate in care for your patient.

## 2019-02-07 ENCOUNTER — OFFICE VISIT (OUTPATIENT)
Dept: MEDICAL GROUP | Facility: CLINIC | Age: 69
End: 2019-02-07
Payer: MEDICARE

## 2019-02-07 VITALS — BODY MASS INDEX: 26.46 KG/M2 | WEIGHT: 155 LBS | HEIGHT: 64 IN

## 2019-02-07 DIAGNOSIS — M18.0 OSTEOARTHRITIS OF CARPOMETACARPAL (CMC) JOINT OF BOTH THUMBS: ICD-10-CM

## 2019-02-07 PROCEDURE — 20604 DRAIN/INJ JOINT/BURSA W/US: CPT | Performed by: FAMILY MEDICINE

## 2019-02-07 RX ORDER — TRIAMCINOLONE ACETONIDE 40 MG/ML
40 INJECTION, SUSPENSION INTRA-ARTICULAR; INTRAMUSCULAR ONCE
Status: COMPLETED | OUTPATIENT
Start: 2019-02-07 | End: 2019-02-07

## 2019-02-07 RX ADMIN — TRIAMCINOLONE ACETONIDE 40 MG: 40 INJECTION, SUSPENSION INTRA-ARTICULAR; INTRAMUSCULAR at 15:44

## 2019-02-07 NOTE — PROCEDURES
BILATERAL PROCEDURE NOTE:  BILATERAL HAND CMC joint intra-articular corticosteroid injection  Risks and benefits discussed  Informed consent obtained  Hand prepped in sterile fashion utilizing   20 mg of Kenalog and 0.5 cc of Marcaine injected into the CMC joint of BOTH wrists  Vapocoolant spray was utilized  Patient tolerated the procedure well  Postprocedure care and red flags discussed

## 2019-02-07 NOTE — PROGRESS NOTES
1. Osteoarthritis of carpometacarpal (CMC) joint of both thumbs  triamcinolone acetonide (KENALOG-40) injection 40 mg     BILATERAL ultrasound-guided corticosteroid injection of the CMC joint of BOTH wrists performed in the office TODAY (February 7, 2019)    Return in about 4 weeks (around 3/7/2019).  To see how she is doing after her BILATERAL CMC joint corticosteroid injections

## 2019-02-10 DIAGNOSIS — E78.00 PURE HYPERCHOLESTEROLEMIA: ICD-10-CM

## 2019-02-12 RX ORDER — ATORVASTATIN CALCIUM 40 MG/1
TABLET, FILM COATED ORAL
Qty: 90 TAB | Refills: 1 | Status: SHIPPED | OUTPATIENT
Start: 2019-02-12 | End: 2019-04-18 | Stop reason: SDUPTHER

## 2019-03-11 ENCOUNTER — PATIENT MESSAGE (OUTPATIENT)
Dept: MEDICAL GROUP | Facility: PHYSICIAN GROUP | Age: 69
End: 2019-03-11

## 2019-03-12 NOTE — TELEPHONE ENCOUNTER
From: Elizabeth Murphy  To: TERRI Serna  Sent: 3/11/2019 4:24 PM PDT  Subject: Prescription Question    My Psychiatrist's office has closed and my last refill of Trazadone is almost gone. I am in the process of finding a new Psych. but was hoping you could order refills of this drug for me. I am taking it for sleep. Thank you, Elizabeth.

## 2019-03-21 RX ORDER — TRAZODONE HYDROCHLORIDE 100 MG/1
100 TABLET ORAL
Qty: 30 TAB | Status: CANCELLED
Start: 2019-03-21

## 2019-03-21 RX ORDER — TRAZODONE HYDROCHLORIDE 100 MG/1
100 TABLET ORAL
Qty: 90 TAB | Refills: 1 | Status: SHIPPED | OUTPATIENT
Start: 2019-03-21 | End: 2022-03-14

## 2019-03-21 NOTE — TELEPHONE ENCOUNTER
Please let pt know that I have filled this and sorry for the delay--today is the first time I am seeing this request.

## 2019-03-27 DIAGNOSIS — M79.7 FIBROMYALGIA: ICD-10-CM

## 2019-03-27 RX ORDER — GABAPENTIN 800 MG/1
TABLET ORAL
Qty: 270 TAB | Refills: 1 | Status: SHIPPED | OUTPATIENT
Start: 2019-03-27 | End: 2019-10-14 | Stop reason: SDUPTHER

## 2019-03-27 NOTE — TELEPHONE ENCOUNTER
Pt last seen regarding this issue 1/19. Will send 6 months of fills to pharmacy. Renal function is decreased but levels are within limits for this med.

## 2019-04-18 DIAGNOSIS — E78.00 PURE HYPERCHOLESTEROLEMIA: ICD-10-CM

## 2019-04-18 DIAGNOSIS — I10 ESSENTIAL HYPERTENSION: ICD-10-CM

## 2019-04-18 RX ORDER — LISINOPRIL AND HYDROCHLOROTHIAZIDE 20; 12.5 MG/1; MG/1
TABLET ORAL
Qty: 100 TAB | Refills: 1 | Status: SHIPPED | OUTPATIENT
Start: 2019-04-18 | End: 2019-06-30 | Stop reason: SDUPTHER

## 2019-04-18 RX ORDER — ATORVASTATIN CALCIUM 40 MG/1
TABLET, FILM COATED ORAL
Qty: 100 TAB | Refills: 1 | Status: SHIPPED | OUTPATIENT
Start: 2019-04-18 | End: 2019-08-20 | Stop reason: SDUPTHER

## 2019-05-03 ENCOUNTER — OFFICE VISIT (OUTPATIENT)
Dept: MEDICAL GROUP | Facility: CLINIC | Age: 69
End: 2019-05-03
Payer: MEDICARE

## 2019-05-03 ENCOUNTER — APPOINTMENT (OUTPATIENT)
Dept: RADIOLOGY | Facility: IMAGING CENTER | Age: 69
End: 2019-05-03
Attending: FAMILY MEDICINE
Payer: MEDICARE

## 2019-05-03 VITALS
DIASTOLIC BLOOD PRESSURE: 76 MMHG | TEMPERATURE: 97.9 F | WEIGHT: 155 LBS | HEIGHT: 64 IN | SYSTOLIC BLOOD PRESSURE: 118 MMHG | OXYGEN SATURATION: 97 % | BODY MASS INDEX: 26.46 KG/M2 | HEART RATE: 78 BPM | RESPIRATION RATE: 18 BRPM

## 2019-05-03 DIAGNOSIS — M18.0 OSTEOARTHRITIS OF CARPOMETACARPAL (CMC) JOINT OF BOTH THUMBS: ICD-10-CM

## 2019-05-03 DIAGNOSIS — M79.671 RIGHT FOOT PAIN: ICD-10-CM

## 2019-05-03 DIAGNOSIS — M19.171 POST-TRAUMATIC OSTEOARTHRITIS OF RIGHT FOOT: ICD-10-CM

## 2019-05-03 PROCEDURE — 99214 OFFICE O/P EST MOD 30 MIN: CPT | Performed by: FAMILY MEDICINE

## 2019-05-03 PROCEDURE — 73630 X-RAY EXAM OF FOOT: CPT | Mod: TC,RT | Performed by: FAMILY MEDICINE

## 2019-05-03 NOTE — PROGRESS NOTES
"  Subjective:      UP wrist Pain (Referral from PCP/ Bilateral wrist pain )     Referred by TERRI Serna for evaluation of BILATERAL base of thumb pain    HPI   BILATERAL base of thumb pain (right-hand-dominant) RIGHT continues to be worse than the left  Left CMC joint pain resolved after corticosteroid injection  Right CMC joint improved after corticosteroid injection, but symptoms have recurred  Intense achy pain at the base of the RIGHT thumb  Difficulty opening jars and grasping objects    NEW PROBLEM, RIGHT foot pain  Pain at the dorsum of the foot as well as noticing that her arch is falling  Worse with wearing shoes that do not have arch support or ambulating excessively  Improved with rest  No known trauma  No night symptoms       Objective:     /76 (BP Location: Left arm, Patient Position: Sitting, BP Cuff Size: Adult)   Pulse 78   Temp 36.6 °C (97.9 °F) (Temporal)   Resp 18   Ht 1.626 m (5' 4\")   Wt 70.3 kg (155 lb)   SpO2 97%   BMI 26.61 kg/m²     Physical Exam     Hand exam    NAD  Alert and oriented    BILATERAL WRIST exam  Range of motion intact  No tenderness along scaphoid, TFCC insertion, distal radius or distal ulna  Tinel's testing is NEGATIVE  The hand is otherwise neurovascularly intact  Finkelstein's POSITIVE on the RIGHT compared to left with POSITIVE tenderness along the abductor pollicis longus tendon sheath in the RIGHT    BILATERAL hand exam  NO swelling  NO deformity  Range of motion of all MCP, DIP and PIP joints NORMAL  Pinky opposition NORMAL  Grind test is POSITIVE BILATERALLY POSITIVE tenderness at the CMC joint on the RIGHT   Collateral ligament testing is NORMAL       Assessment/Plan:     1. Osteoarthritis of carpometacarpal (CMC) joint of both thumbs  REFERRAL TO OCCUPATIONAL THERAPY Reason for Therapy: Eval/Treat/Report   2. Post-traumatic osteoarthritis of right foot  DX-FOOT-COMPLETE 3+ RIGHT     LEFT CMC joint responded well to corticosteroid " "injection  RIGHT CMC also responded well but started hurting again    Today she is complaining of NEW PROBLEM   pain in the RIGHT foot and lateral ankle  Tight and achy and now a bulge at the RIGHT  Arch of the foot   Recommend orthotics or shoes with better arch support (superfeet or \"a proper fit shoes\")  If she fails that we may consider an [place] brace or ankle bracing    Return in about 4 weeks (around 5/31/2019).  To see how she is doing                5/3/2019 2:50 PM    HISTORY/REASON FOR EXAM:  Atraumatic Pain/Swelling/Deformity  Chronic worsening RIGHT foot pain.    TECHNIQUE/EXAM DESCRIPTION AND NUMBER OF VIEWS:  4 views of the RIGHT foot obtained weightbearing.    COMPARISON:  None.    FINDINGS: Narrowing of the interphalangeal joints.  Degenerative change of RIGHT midfoot.  Widening is seen of the joint space between the base of 1st and 2nd metatarsals with calcification present.  Step-off between the 2nd metatarsal base and middle cuneiform.  No fracture identified.  Deformity the base of 2nd metatarsal suggests prior injury.  No focal soft tissue swelling   Impression       1.  Moderate degenerative change of RIGHT foot primarily involving the midfoot with evidence of prior trauma at the base of 2nd metatarsal.  2.  Step-off between the RIGHT 2nd metatarsal base and middle cuneiform suggests chronic Lisfranc type injury with widening of the interspace between the 1st and 2nd metatarsal bases.     Interpreted in the office today with the patient          2/1/2019 2:16 PM    HISTORY/REASON FOR EXAM:  Left hand pain.    TECHNIQUE/EXAM DESCRIPTION AND NUMBER OF VIEWS:  3 views of the LEFT hand.    COMPARISON: None.    FINDINGS:    BONE MINERALIZATION: Normal.  JOINTS: Mild to moderate STT and CMC-1 osteoarthrosis. Mild multifocal interphalangeal osteoarthrosis. No erosions.  FRACTURE: None.  DISLOCATION: None.  SOFT TISSUES: No mass.   Impression       1.  Mild to moderate STT and CMC-1 osteoarthrosis. " Mild multifocal interphalangeal osteoarthrosis.  2.  No acute osseous abnormality.           2/1/2019 2:13 PM    HISTORY/REASON FOR EXAM:  Right hand pain.    TECHNIQUE/EXAM DESCRIPTION AND NUMBER OF VIEWS:  3 views of the RIGHT hand.    COMPARISON: 4/16/2017    FINDINGS:    BONE MINERALIZATION: Normal.  JOINTS: Mild multifocal osteoarthrosis. No erosions.  FRACTURE: None.  DISLOCATION: None.  SOFT TISSUES: No mass.   Impression       Mild multifocal osteoarthrosis. No acute osseous abnormality.     Thank you Elizabeth Babcock, A.P.R.N. for allowing me to participate in care for your patient.

## 2019-05-06 NOTE — TELEPHONE ENCOUNTER
Was the patient seen in the last year in this department? Yes    Does patient have an active prescription for medications requested? No     Received Request Via: Pharmacy      Pt met protocol?: Yes, OV last week, entered as historical

## 2019-05-07 RX ORDER — BUPROPION HYDROCHLORIDE 150 MG/1
150 TABLET, EXTENDED RELEASE ORAL 2 TIMES DAILY
Qty: 180 TAB | Refills: 0 | Status: SHIPPED | OUTPATIENT
Start: 2019-05-07 | End: 2022-07-08

## 2019-06-26 RX ORDER — CITALOPRAM 20 MG/1
20 TABLET ORAL DAILY
Qty: 90 TAB | Refills: 1 | Status: SHIPPED | OUTPATIENT
Start: 2019-06-26 | End: 2022-07-08

## 2019-06-28 DIAGNOSIS — I10 ESSENTIAL HYPERTENSION: ICD-10-CM

## 2019-06-30 DIAGNOSIS — I10 ESSENTIAL HYPERTENSION: ICD-10-CM

## 2019-06-30 RX ORDER — LISINOPRIL AND HYDROCHLOROTHIAZIDE 20; 12.5 MG/1; MG/1
TABLET ORAL
Qty: 100 TAB | Refills: 3 | Status: SHIPPED | OUTPATIENT
Start: 2019-06-30 | End: 2019-06-30 | Stop reason: SDUPTHER

## 2019-06-30 RX ORDER — LISINOPRIL AND HYDROCHLOROTHIAZIDE 20; 12.5 MG/1; MG/1
TABLET ORAL
Qty: 100 TAB | Refills: 3 | Status: SHIPPED | OUTPATIENT
Start: 2019-06-30 | End: 2022-03-14

## 2019-07-01 RX ORDER — LISINOPRIL AND HYDROCHLOROTHIAZIDE 20; 12.5 MG/1; MG/1
TABLET ORAL
Refills: 1 | OUTPATIENT
Start: 2019-07-01

## 2019-07-11 ENCOUNTER — TELEPHONE (OUTPATIENT)
Dept: MEDICAL GROUP | Facility: PHYSICIAN GROUP | Age: 69
End: 2019-07-11

## 2019-07-11 DIAGNOSIS — E03.9 HYPOTHYROIDISM, UNSPECIFIED TYPE: ICD-10-CM

## 2019-07-11 DIAGNOSIS — E55.9 VITAMIN D DEFICIENCY DISEASE: ICD-10-CM

## 2019-07-11 DIAGNOSIS — E78.5 DYSLIPIDEMIA: ICD-10-CM

## 2019-07-11 DIAGNOSIS — I10 ESSENTIAL HYPERTENSION: ICD-10-CM

## 2019-07-11 DIAGNOSIS — D64.9 NORMOCYTIC ANEMIA: ICD-10-CM

## 2019-08-19 DIAGNOSIS — E78.00 PURE HYPERCHOLESTEROLEMIA: ICD-10-CM

## 2019-08-20 RX ORDER — ATORVASTATIN CALCIUM 40 MG/1
TABLET, FILM COATED ORAL
Qty: 100 TAB | Refills: 0 | Status: SHIPPED | OUTPATIENT
Start: 2019-08-20 | End: 2019-11-27

## 2019-08-22 NOTE — TELEPHONE ENCOUNTER
Phone Number Called: [phone] (home)       Call outcome: left message for patient to call back regarding message below    Message: advised medication is available for pick at Pharmacy-labs need to be done prior to next appt.

## 2019-08-27 ENCOUNTER — HOSPITAL ENCOUNTER (OUTPATIENT)
Dept: LAB | Facility: MEDICAL CENTER | Age: 69
End: 2019-08-27
Attending: NURSE PRACTITIONER
Payer: MEDICARE

## 2019-08-27 DIAGNOSIS — E55.9 VITAMIN D DEFICIENCY DISEASE: ICD-10-CM

## 2019-08-27 DIAGNOSIS — D64.9 NORMOCYTIC ANEMIA: ICD-10-CM

## 2019-08-27 DIAGNOSIS — E03.9 HYPOTHYROIDISM, UNSPECIFIED TYPE: ICD-10-CM

## 2019-08-27 DIAGNOSIS — E78.5 DYSLIPIDEMIA: ICD-10-CM

## 2019-08-27 DIAGNOSIS — I10 ESSENTIAL HYPERTENSION: ICD-10-CM

## 2019-08-27 LAB
25(OH)D3 SERPL-MCNC: 41 NG/ML (ref 30–100)
ALBUMIN SERPL BCP-MCNC: 4.6 G/DL (ref 3.2–4.9)
ALBUMIN/GLOB SERPL: 1.9 G/DL
ALP SERPL-CCNC: 79 U/L (ref 30–99)
ALT SERPL-CCNC: 15 U/L (ref 2–50)
ANION GAP SERPL CALC-SCNC: 7 MMOL/L (ref 0–11.9)
AST SERPL-CCNC: 14 U/L (ref 12–45)
BASOPHILS # BLD AUTO: 0.5 % (ref 0–1.8)
BASOPHILS # BLD: 0.03 K/UL (ref 0–0.12)
BILIRUB SERPL-MCNC: 0.4 MG/DL (ref 0.1–1.5)
BUN SERPL-MCNC: 20 MG/DL (ref 8–22)
CALCIUM SERPL-MCNC: 10.4 MG/DL (ref 8.5–10.5)
CHLORIDE SERPL-SCNC: 99 MMOL/L (ref 96–112)
CHOLEST SERPL-MCNC: 186 MG/DL (ref 100–199)
CO2 SERPL-SCNC: 29 MMOL/L (ref 20–33)
CREAT SERPL-MCNC: 0.96 MG/DL (ref 0.5–1.4)
EOSINOPHIL # BLD AUTO: 0.26 K/UL (ref 0–0.51)
EOSINOPHIL NFR BLD: 4.7 % (ref 0–6.9)
ERYTHROCYTE [DISTWIDTH] IN BLOOD BY AUTOMATED COUNT: 42.6 FL (ref 35.9–50)
FASTING STATUS PATIENT QL REPORTED: NORMAL
GLOBULIN SER CALC-MCNC: 2.4 G/DL (ref 1.9–3.5)
GLUCOSE SERPL-MCNC: 108 MG/DL (ref 65–99)
HCT VFR BLD AUTO: 40.3 % (ref 37–47)
HDLC SERPL-MCNC: 57 MG/DL
HGB BLD-MCNC: 13 G/DL (ref 12–16)
IMM GRANULOCYTES # BLD AUTO: 0.01 K/UL (ref 0–0.11)
IMM GRANULOCYTES NFR BLD AUTO: 0.2 % (ref 0–0.9)
LDLC SERPL CALC-MCNC: 108 MG/DL
LYMPHOCYTES # BLD AUTO: 2.36 K/UL (ref 1–4.8)
LYMPHOCYTES NFR BLD: 43.1 % (ref 22–41)
MCH RBC QN AUTO: 31.3 PG (ref 27–33)
MCHC RBC AUTO-ENTMCNC: 32.3 G/DL (ref 33.6–35)
MCV RBC AUTO: 96.9 FL (ref 81.4–97.8)
MONOCYTES # BLD AUTO: 0.35 K/UL (ref 0–0.85)
MONOCYTES NFR BLD AUTO: 6.4 % (ref 0–13.4)
NEUTROPHILS # BLD AUTO: 2.47 K/UL (ref 2–7.15)
NEUTROPHILS NFR BLD: 45.1 % (ref 44–72)
NRBC # BLD AUTO: 0 K/UL
NRBC BLD-RTO: 0 /100 WBC
PLATELET # BLD AUTO: 257 K/UL (ref 164–446)
PMV BLD AUTO: 10.4 FL (ref 9–12.9)
POTASSIUM SERPL-SCNC: 4.3 MMOL/L (ref 3.6–5.5)
PROT SERPL-MCNC: 7 G/DL (ref 6–8.2)
RBC # BLD AUTO: 4.16 M/UL (ref 4.2–5.4)
SODIUM SERPL-SCNC: 135 MMOL/L (ref 135–145)
TRIGL SERPL-MCNC: 103 MG/DL (ref 0–149)
TSH SERPL DL<=0.005 MIU/L-ACNC: 0.69 UIU/ML (ref 0.38–5.33)
WBC # BLD AUTO: 5.5 K/UL (ref 4.8–10.8)

## 2019-08-27 PROCEDURE — 80053 COMPREHEN METABOLIC PANEL: CPT

## 2019-08-27 PROCEDURE — 80061 LIPID PANEL: CPT

## 2019-08-27 PROCEDURE — 84443 ASSAY THYROID STIM HORMONE: CPT

## 2019-08-27 PROCEDURE — 36415 COLL VENOUS BLD VENIPUNCTURE: CPT

## 2019-08-27 PROCEDURE — 85025 COMPLETE CBC W/AUTO DIFF WBC: CPT

## 2019-08-27 PROCEDURE — 82306 VITAMIN D 25 HYDROXY: CPT

## 2019-09-03 ENCOUNTER — OFFICE VISIT (OUTPATIENT)
Dept: MEDICAL GROUP | Facility: PHYSICIAN GROUP | Age: 69
End: 2019-09-03
Payer: MEDICARE

## 2019-09-03 VITALS
SYSTOLIC BLOOD PRESSURE: 114 MMHG | WEIGHT: 157 LBS | OXYGEN SATURATION: 94 % | RESPIRATION RATE: 16 BRPM | TEMPERATURE: 99.9 F | DIASTOLIC BLOOD PRESSURE: 78 MMHG | BODY MASS INDEX: 26.8 KG/M2 | HEIGHT: 64 IN | HEART RATE: 80 BPM

## 2019-09-03 DIAGNOSIS — F33.1 MODERATE EPISODE OF RECURRENT MAJOR DEPRESSIVE DISORDER (HCC): ICD-10-CM

## 2019-09-03 DIAGNOSIS — Z11.59 NEED FOR HEPATITIS C SCREENING TEST: ICD-10-CM

## 2019-09-03 DIAGNOSIS — M19.90 ARTHRITIS: ICD-10-CM

## 2019-09-03 DIAGNOSIS — N18.30 CHRONIC KIDNEY DISEASE, STAGE 3 (HCC): ICD-10-CM

## 2019-09-03 DIAGNOSIS — Z23 IMMUNIZATION DUE: ICD-10-CM

## 2019-09-03 PROCEDURE — 99214 OFFICE O/P EST MOD 30 MIN: CPT | Mod: 25 | Performed by: NURSE PRACTITIONER

## 2019-09-03 PROCEDURE — 90732 PPSV23 VACC 2 YRS+ SUBQ/IM: CPT | Performed by: NURSE PRACTITIONER

## 2019-09-03 PROCEDURE — 90472 IMMUNIZATION ADMIN EACH ADD: CPT | Performed by: NURSE PRACTITIONER

## 2019-09-03 PROCEDURE — 90750 HZV VACC RECOMBINANT IM: CPT | Performed by: NURSE PRACTITIONER

## 2019-09-03 PROCEDURE — G0009 ADMIN PNEUMOCOCCAL VACCINE: HCPCS | Performed by: NURSE PRACTITIONER

## 2019-09-03 RX ORDER — HYDROCODONE BITARTRATE AND ACETAMINOPHEN 5; 325 MG/1; MG/1
1-2 TABLET ORAL EVERY 4 HOURS PRN
COMMUNITY
End: 2022-07-08

## 2019-09-03 RX ORDER — DEXTROAMPHETAMINE SACCHARATE, AMPHETAMINE ASPARTATE MONOHYDRATE, DEXTROAMPHETAMINE SULFATE AND AMPHETAMINE SULFATE 3.75; 3.75; 3.75; 3.75 MG/1; MG/1; MG/1; MG/1
15 CAPSULE, EXTENDED RELEASE ORAL
Status: ON HOLD | COMMUNITY
Start: 2019-08-21 | End: 2022-07-11

## 2019-09-03 SDOH — HEALTH STABILITY: MENTAL HEALTH: HOW OFTEN DO YOU HAVE A DRINK CONTAINING ALCOHOL?: 2-3 TIMES A WEEK

## 2019-09-03 SDOH — HEALTH STABILITY: MENTAL HEALTH: HOW OFTEN DO YOU HAVE 6 OR MORE DRINKS ON ONE OCCASION?: NEVER

## 2019-09-03 SDOH — HEALTH STABILITY: MENTAL HEALTH: HOW MANY STANDARD DRINKS CONTAINING ALCOHOL DO YOU HAVE ON A TYPICAL DAY?: 1 OR 2

## 2019-09-03 ASSESSMENT — PAIN SCALES - GENERAL: PAINLEVEL: NO PAIN

## 2019-09-03 NOTE — PATIENT INSTRUCTIONS
shingrix and pneumovax today    Labs today if you would like     see me in 6 months, will do PAP at that appt

## 2019-09-04 NOTE — ASSESSMENT & PLAN NOTE
Patient has arthritis in multiple joints, almost every joint is symmetric.  She is very concerned for rheumatologic or autoimmune disorder and would like work-up, labs have been ordered.

## 2019-09-04 NOTE — ASSESSMENT & PLAN NOTE
Chronic and stable, discussed the importance of adequate hydration, avoiding nephrotoxic medications and excessive salt in the diet and keeping blood pressure under good control.  We will continue to monitor this.

## 2019-09-04 NOTE — ASSESSMENT & PLAN NOTE
This is a chronic condition, stable and very well controlled on current medications, closely followed by psychiatry who also manages her medications.  She is tolerating all of her meds without difficulty.

## 2019-09-06 ENCOUNTER — OFFICE VISIT (OUTPATIENT)
Dept: MEDICAL GROUP | Facility: CLINIC | Age: 69
End: 2019-09-06
Payer: MEDICARE

## 2019-09-06 VITALS
WEIGHT: 157 LBS | BODY MASS INDEX: 26.8 KG/M2 | HEART RATE: 78 BPM | OXYGEN SATURATION: 93 % | SYSTOLIC BLOOD PRESSURE: 120 MMHG | DIASTOLIC BLOOD PRESSURE: 76 MMHG | RESPIRATION RATE: 16 BRPM | HEIGHT: 64 IN | TEMPERATURE: 97.8 F

## 2019-09-06 DIAGNOSIS — M25.571 SINUS TARSI SYNDROME, RIGHT: ICD-10-CM

## 2019-09-06 PROCEDURE — 20605 DRAIN/INJ JOINT/BURSA W/O US: CPT | Performed by: FAMILY MEDICINE

## 2019-09-06 PROCEDURE — 99213 OFFICE O/P EST LOW 20 MIN: CPT | Mod: 25 | Performed by: FAMILY MEDICINE

## 2019-09-06 RX ORDER — TRIAMCINOLONE ACETONIDE 40 MG/ML
20 INJECTION, SUSPENSION INTRA-ARTICULAR; INTRAMUSCULAR ONCE
Status: COMPLETED | OUTPATIENT
Start: 2019-09-06 | End: 2019-09-06

## 2019-09-06 RX ADMIN — TRIAMCINOLONE ACETONIDE 20 MG: 40 INJECTION, SUSPENSION INTRA-ARTICULAR; INTRAMUSCULAR at 13:40

## 2019-09-06 NOTE — PROCEDURES
PROCEDURE NOTE:  right Ankle sinus Tarsi corticosteroid injection   risks and benefits discussed  Informed consent obtained  Ankle prepped in sterile fashion utilizing a pieter-medial approach  25-gauge needle was utilized to inject  20 mg of Kenalog and 0.5 cc of Marcaine injected into the Ankle at the sinus Tarsi   Vapocoolant spray was utilized  Patient tolerated the procedure well  Postprocedure care and red flags discussed

## 2019-09-06 NOTE — PROGRESS NOTES
"  Subjective:      UP wrist Pain (Referral from PCP/ Bilateral wrist pain )     Referred by TERRI Serna for evaluation of BILATERAL base of thumb pain    HPI   BILATERAL base of thumb pain (right-hand-dominant) RIGHT continues to be worse than the left  She continues to attend therapy for this which is HELPING    Today she is complaining of recurrence of her RIGHT foot pain  Pain at the dorsum of the foot as well as noticing that her arch is falling  He appears that using tennis shoes with additional arch support helped, but now she is having some pain in the LEFT Achilles region with some swelling together with RIGHT lateral ankle and dorsal foot pain with some burning sensation radiating down the dorsum of the foot  Worse with wearing shoes that do not have arch support or ambulating excessively  Improved with rest  No known trauma  No night symptoms     Objective:     /76 (BP Location: Right arm, Patient Position: Sitting, BP Cuff Size: Adult)   Pulse 78   Temp 36.6 °C (97.8 °F) (Temporal)   Resp 16   Ht 1.626 m (5' 4\")   Wt 71.2 kg (157 lb)   SpO2 93%   BMI 26.95 kg/m²     Physical Exam     Hand exam    NAD  Alert and oriented    RIGHT ANKLE:  There is NO swelling noted at the ankle  Caps positive tenderness at the region of the sinus Tarsi  Tinel's testing is POSITIVE with palpation in the region of the sinus Tarsi  Range of motion intact with dorsiflexion and plantarflexion, inversion and eversion  There is NO tenderness of the ATFL, CF or PTF ligament  There is NO tenderness of the lateral malleolus or medial malleolus  Anterior drawer testing is NEGATIVE  Talar tilt testing is NEGATIVE  The foot and ankle is otherwise neurovascularly intact    RIGHT FOOT:  There is NO swelling noted at the foot  There is NO tenderness at the base of the fifth metatarsal, cuboid, or tarsal navicular  There is NO pain with metatarsal squeeze test    LEFT ANKLE:  There is NO swelling noted at the " "ankle  Range of motion intact with dorsiflexion and plantarflexion, inversion and eversion  There is NO tenderness of the ATFL, CF or PTF ligament  There is NO tenderness of the lateral malleolus or medial malleolus  Anterior drawer testing is NEGATIVE  Talar tilt testing is NEGATIVE  The foot and ankle is otherwise neurovascularly intact    LEFT FOOT:  There is NO swelling noted at the foot  There is NO tenderness at the base of the fifth metatarsal, cuboid, or tarsal navicular  There is NO pain with metatarsal squeeze test    NEUTRAL stance  Able to ambulate with NORMAL gait     Assessment/Plan:     1. Sinus tarsi syndrome, right  triamcinolone acetonide (KENALOG-40) injection 20 mg     RIGHT foot and lateral ankle symptoms likely secondary from sinus Tarsi syndrome  She does not appear to have significant tenderness in the region of her foot arthritis    Sinus Tarsi corticosteroid injection the RIGHT ankle performed in the office TODAY (September 6, 2019)    Recommend orthotics or shoes with better arch support (superfeet or \"a proper fit shoes\")  If she fails that we may consider an [place] brace or ankle bracing    Return in about 4 weeks (around 10/4/2019).  To see how she is doing after RIGHT ankle sinus Tarsi corticosteroid injection                5/3/2019 2:50 PM    HISTORY/REASON FOR EXAM:  Atraumatic Pain/Swelling/Deformity  Chronic worsening RIGHT foot pain.    TECHNIQUE/EXAM DESCRIPTION AND NUMBER OF VIEWS:  4 views of the RIGHT foot obtained weightbearing.    COMPARISON:  None.    FINDINGS: Narrowing of the interphalangeal joints.  Degenerative change of RIGHT midfoot.  Widening is seen of the joint space between the base of 1st and 2nd metatarsals with calcification present.  Step-off between the 2nd metatarsal base and middle cuneiform.  No fracture identified.  Deformity the base of 2nd metatarsal suggests prior injury.  No focal soft tissue swelling   Impression       1.  Moderate degenerative " change of RIGHT foot primarily involving the midfoot with evidence of prior trauma at the base of 2nd metatarsal.  2.  Step-off between the RIGHT 2nd metatarsal base and middle cuneiform suggests chronic Lisfranc type injury with widening of the interspace between the 1st and 2nd metatarsal bases.     Interpreted in the office today with the patient          2/1/2019 2:16 PM    HISTORY/REASON FOR EXAM:  Left hand pain.    TECHNIQUE/EXAM DESCRIPTION AND NUMBER OF VIEWS:  3 views of the LEFT hand.    COMPARISON: None.    FINDINGS:    BONE MINERALIZATION: Normal.  JOINTS: Mild to moderate STT and CMC-1 osteoarthrosis. Mild multifocal interphalangeal osteoarthrosis. No erosions.  FRACTURE: None.  DISLOCATION: None.  SOFT TISSUES: No mass.   Impression       1.  Mild to moderate STT and CMC-1 osteoarthrosis. Mild multifocal interphalangeal osteoarthrosis.  2.  No acute osseous abnormality.           2/1/2019 2:13 PM    HISTORY/REASON FOR EXAM:  Right hand pain.    TECHNIQUE/EXAM DESCRIPTION AND NUMBER OF VIEWS:  3 views of the RIGHT hand.    COMPARISON: 4/16/2017    FINDINGS:    BONE MINERALIZATION: Normal.  JOINTS: Mild multifocal osteoarthrosis. No erosions.  FRACTURE: None.  DISLOCATION: None.  SOFT TISSUES: No mass.   Impression       Mild multifocal osteoarthrosis. No acute osseous abnormality.     Thank you Elizabeth Babcock, A.P.R.N. for allowing me to participate in care for your patient.

## 2019-09-20 ENCOUNTER — NON-PROVIDER VISIT (OUTPATIENT)
Dept: MEDICAL GROUP | Facility: PHYSICIAN GROUP | Age: 69
End: 2019-09-20
Payer: MEDICARE

## 2019-09-20 DIAGNOSIS — Z23 NEED FOR VACCINATION: ICD-10-CM

## 2019-09-20 PROCEDURE — G0008 ADMIN INFLUENZA VIRUS VAC: HCPCS | Performed by: FAMILY MEDICINE

## 2019-09-20 PROCEDURE — 90662 IIV NO PRSV INCREASED AG IM: CPT | Performed by: FAMILY MEDICINE

## 2019-10-07 ENCOUNTER — OFFICE VISIT (OUTPATIENT)
Dept: MEDICAL GROUP | Facility: CLINIC | Age: 69
End: 2019-10-07
Payer: MEDICARE

## 2019-10-07 VITALS
OXYGEN SATURATION: 94 % | WEIGHT: 157 LBS | DIASTOLIC BLOOD PRESSURE: 76 MMHG | HEIGHT: 64 IN | HEART RATE: 67 BPM | SYSTOLIC BLOOD PRESSURE: 118 MMHG | RESPIRATION RATE: 18 BRPM | TEMPERATURE: 98.3 F | BODY MASS INDEX: 26.8 KG/M2

## 2019-10-07 DIAGNOSIS — M18.0 OSTEOARTHRITIS OF CARPOMETACARPAL (CMC) JOINT OF BOTH THUMBS: ICD-10-CM

## 2019-10-07 DIAGNOSIS — M25.571 SINUS TARSI SYNDROME, RIGHT: ICD-10-CM

## 2019-10-07 PROCEDURE — 99213 OFFICE O/P EST LOW 20 MIN: CPT | Performed by: FAMILY MEDICINE

## 2019-10-07 NOTE — PROGRESS NOTES
"  Subjective:      UP wrist Pain (Referral from PCP/ Bilateral wrist pain )     Referred by TERRI Serna for evaluation of BILATERAL base of thumb pain    HPI   BILATERAL base of thumb pain (right-hand-dominant) RIGHT continues to be worse than the left  She continues to attend therapy for this which is HELPING  Unfortunately, she was busy through the summer months and she has recently scheduled to start occupational therapy  Apparently, the corticosteroid injections she received in the CMC joints back in February 2019 have worn off      She has here for FOLLOW-up for her RIGHT foot pain  Sinus Tarsi corticosteroid injection HELPED, now she is mostly having pain at the midfoot  Pain at the dorsum of the foot as well as noticing that her arch is falling  He appears that using tennis shoes with additional arch support helped, but now she is having some pain in the LEFT Achilles region with some swelling together with RIGHT lateral ankle and dorsal foot pain with some   She is feeling BETTER with NEW shoes with better arch support     Objective:     /76 (BP Location: Left arm, Patient Position: Sitting, BP Cuff Size: Adult)   Pulse 67   Temp 36.8 °C (98.3 °F) (Temporal)   Resp 18   Ht 1.626 m (5' 4\")   Wt 71.2 kg (157 lb)   SpO2 94%   BMI 26.95 kg/m²     Physical Exam     Hand exam    NAD  Alert and oriented    RIGHT ANKLE:  There is NO swelling noted at the ankle  MINIMAL tenderness at the region of the sinus Tarsi  Tinel's testing is POSITIVE with palpation in the region of the sinus Tarsi    RIGHT FOOT:  There is NO swelling noted at the foot  There is NO tenderness at the base of the fifth metatarsal, cuboid, or tarsal navicular  There is NO pain with metatarsal squeeze test  POSITIVE tenderness at the tarsometatarsal joint at the SECOND metatarsal base    NEUTRAL stance  Able to ambulate with NORMAL gait     Assessment/Plan:     1. Sinus tarsi syndrome, right     2. Osteoarthritis of " carpometacarpal (CMC) joint of both thumbs       RIGHT foot and lateral ankle symptoms due to sinus Tarsi syndrome IMPROVED after corticosteroid injection  She is now having more tenderness/pain in the region of her foot arthritis, particularly at the SECOND tarsometatarsal joint at the base of the second metatarsal    Sinus Tarsi corticosteroid injection the RIGHT ankle performed (September 6, 2019) HELPED    Continue shoes with good arch support    Apparently, the corticosteroid injections she received in the CMC joints back in February 2019 have worn off  Advised that she should do some occupational therapy and see how she responds, if symptoms persist or worsen we can reconsider repeat CMC corticosteroid injections      For RIGHT foot SECOND metatarsal base corticosteroid injection under ultrasound guidance (half-and-half)                5/3/2019 2:50 PM    HISTORY/REASON FOR EXAM:  Atraumatic Pain/Swelling/Deformity  Chronic worsening RIGHT foot pain.    TECHNIQUE/EXAM DESCRIPTION AND NUMBER OF VIEWS:  4 views of the RIGHT foot obtained weightbearing.    COMPARISON:  None.    FINDINGS: Narrowing of the interphalangeal joints.  Degenerative change of RIGHT midfoot.  Widening is seen of the joint space between the base of 1st and 2nd metatarsals with calcification present.  Step-off between the 2nd metatarsal base and middle cuneiform.  No fracture identified.  Deformity the base of 2nd metatarsal suggests prior injury.  No focal soft tissue swelling   Impression       1.  Moderate degenerative change of RIGHT foot primarily involving the midfoot with evidence of prior trauma at the base of 2nd metatarsal.  2.  Step-off between the RIGHT 2nd metatarsal base and middle cuneiform suggests chronic Lisfranc type injury with widening of the interspace between the 1st and 2nd metatarsal bases.     Interpreted in the office today with the patient          2/1/2019 2:16 PM    HISTORY/REASON FOR EXAM:  Left hand  pain.    TECHNIQUE/EXAM DESCRIPTION AND NUMBER OF VIEWS:  3 views of the LEFT hand.    COMPARISON: None.    FINDINGS:    BONE MINERALIZATION: Normal.  JOINTS: Mild to moderate STT and CMC-1 osteoarthrosis. Mild multifocal interphalangeal osteoarthrosis. No erosions.  FRACTURE: None.  DISLOCATION: None.  SOFT TISSUES: No mass.   Impression       1.  Mild to moderate STT and CMC-1 osteoarthrosis. Mild multifocal interphalangeal osteoarthrosis.  2.  No acute osseous abnormality.           2/1/2019 2:13 PM    HISTORY/REASON FOR EXAM:  Right hand pain.    TECHNIQUE/EXAM DESCRIPTION AND NUMBER OF VIEWS:  3 views of the RIGHT hand.    COMPARISON: 4/16/2017    FINDINGS:    BONE MINERALIZATION: Normal.  JOINTS: Mild multifocal osteoarthrosis. No erosions.  FRACTURE: None.  DISLOCATION: None.  SOFT TISSUES: No mass.   Impression       Mild multifocal osteoarthrosis. No acute osseous abnormality.     Thank you MIKE Serna.P.RCHER for allowing me to participate in care for your patient.

## 2019-10-09 ENCOUNTER — OCCUPATIONAL THERAPY (OUTPATIENT)
Dept: OCCUPATIONAL THERAPY | Facility: REHABILITATION | Age: 69
End: 2019-10-09
Attending: FAMILY MEDICINE
Payer: MEDICARE

## 2019-10-09 DIAGNOSIS — M18.0 PRIMARY OSTEOARTHRITIS OF BOTH FIRST CARPOMETACARPAL JOINTS: ICD-10-CM

## 2019-10-09 PROCEDURE — 97110 THERAPEUTIC EXERCISES: CPT

## 2019-10-09 PROCEDURE — 97166 OT EVAL MOD COMPLEX 45 MIN: CPT

## 2019-10-09 SDOH — ECONOMIC STABILITY: GENERAL: QUALITY OF LIFE: EXCELLENT

## 2019-10-09 ASSESSMENT — ENCOUNTER SYMPTOMS
QUALITY: BURNING
PAIN SCALE: 3
EXACERBATED BY: KEYBOARDING
PAIN SCALE AT HIGHEST: 3
QUALITY: DULL ACHE
PAIN SCALE AT LOWEST: 3
ALLEVIATING FACTORS: HEAT APPLICATION
EXACERBATED BY: TWISTING
EXACERBATED BY: GRIPPING
ALLEVIATING FACTORS: ICE
PAIN TIMING: CONSTANT

## 2019-10-09 NOTE — OP THERAPY EVALUATION
"  Outpatient Occupational Therapy  HAND THERAPY INITIAL EVALUATION    [facility] Occupational Therapy [address].  [address]  [place] [address]  Phone:  [phone]  Fax:  [phone]    Date of Evaluation: 10/09/2019    Patient: Elizabeth Murphy  YOB: 1950  MRN: [id number]     Referring Provider: Gerson Wood M.D.  [address] [place] [address] [place] [address]   Referring Diagnosis Osteoarthritis of carpometacarpal (CMC) joint of both thumbs [M18.0]     Time Calculation  Start time: 0330  Stop time: 0430 Time Calculation (min): 60 minutes       Occupational Therapy Occurrence Codes    Date of onset of impairment:  6/15/18   Date of occupational therapy care plan established or reviewed:  10/9/19   Date of occupational therapy treatment started:  10/9/19          Chief Complaint: Wrist Problem (pain bilaterally) and Hand Problem (pain bilaterally)    Visit Diagnoses     ICD-10-CM   1. Primary osteoarthritis of both first carpometacarpal joints M18.0       Subjective:   History of Present Illness:     Date of onset:  6/15/2018    Mechanism of injury:  Pt with progressive bilateral wrist/thumb pain over the past 4 months, left worse than right, along with decreased movement affecting her ability to perform ADL and IADL activities which require gripping, twisting and dexterity.  Quality of life:  Excellent  Prior level of function:  Independent ADLs, IADLs, and recreational activities.  Retired .  Pain:     Current pain rating:  3    At best pain rating:  3    At worst pain rating:  3    Location:  Right volar/ulnar wrist, left volar wrist.  bilateral CMC joints    Quality:  Dull ache and burning (ulnar wrist pain described as \"burning\", volar wrists and bilateral thumbs described as  \"aching\")    Pain timing:  Constant    Relieving factors:  Ice and heat application    Aggravating factors:  Gripping, twisting and keyboarding (repetitive movement)    Progression:  " Worsening  Social Support:     Lives in:  One-story house    Lives with:  Spouse  Hand dominance:  Right  Diagnostic Tests:     X-ray: abnormal    Treatments:     Previous treatment:  Injection treatment    Treatment Comments:  Steroid injections 2/1/19 and 2/7/19 to bilateral thumbs with good relief for 2 months  Activities of Daily Living:     Patient reported ADL status: Modified independent ADLs, IADLs and recreational activities with extra time, pain, and compensatory strategies.  Enjoys camping and yardwork  Patient Goals:     Patient goals for therapy:  Increased motion, increased strength, decreased pain and independence with ADLs/IADLs      Past Medical History:   Diagnosis Date   • Anxiety    • Common bile duct (CBD) stricture     hx of in 1990s   • Degenerative joint disease     bulging discs in C-spine, L-spine   • Depression     sees Psych in [place]   • Fibromyalgia    • History of mammogram     9/11, diagnostic showed breast cyst, rec 1 year   • Hyperlipidemia    • Hypertension    • Pap test, as part of routine gynecological examination     last 2008, no abn history   • Post concussive syndrome     after fall in 1999   • PTSD (post-traumatic stress disorder)    • S/P colonoscopy     nl at age 52, due 2013   • Skin cancer     SCC     Past Surgical History:   Procedure Laterality Date   • HIP CANNULATED SCREW Right 4/17/2017    Procedure: HIP CANNULATED SCREW;  Surgeon: Dhaval Poole M.D.;  Location: SURGERY [facility];  Service:    • ESTEFANÍA BY LAPAROSCOPY     • EGD W/ENDOSCOPIC ULTRASOUND      1990s, common bile duct stricture, no stent     Social History     Tobacco Use   • Smoking status: Never Smoker   • Smokeless tobacco: Never Used   Substance Use Topics   • Alcohol use: Yes     Alcohol/week: 1.2 oz     Types: 2 Glasses of wine per week     Frequency: 2-3 times a week     Drinks per session: 1 or 2     Binge frequency: Never     Comment: weekly 9/3/19     Family and Occupational History      Socioeconomic History   • Marital status:      Spouse name: Not on file   • Number of children: Not on file   • Years of education: Not on file   • Highest education level: Not on file   Occupational History   • Not on file       Objective   Observations   Additional Observation Details  Bilateral hands/wrist with no observable erythema, edema, skin or joint abnormalities other than mild arthritic deformities bilateral thumb CMC to MCP joints.  Mild left thenar atrophy.  Neurovascular intact.    Neurological Testing   Sensation   Wrist/Hand   Left   Intact: light touch, sharp/dull discrimination and proprioception    Right   Intact: light touch, sharp/dull discrimination and proprioception        Additional Neurological Details  [place]: normal    Tenderness     Left Wrist/Hand   Tenderness in the carpometacarpal joint. No tenderness in the first dorsal compartment, common extensor tendon, distal radioulnar joint and triangular fibrocartilage complex .     Right Wrist/Hand   Tenderness in the carpometacarpal joint. No tenderness in the first dorsal compartment, common extensor tendon, distal radioulnar joint and triangular fibrocartilage complex .     Left Elbow   No tenderness in the common extensor tendon.     Right Elbow   No tenderness in the common extensor tendon.     Active Range of Motion     Left Wrist   Normal active range of motion    Right Wrist   Normal active range of motion    Left Thumb   Opposition: Opposition WNL  Thumb extension 2/3 range, mild hyperextension at IP joint.  PROM WNL.  Otherwise thumb AROM WNL    Right Thumb   Opposition: Opposition WNL  Thumb extension 2/3 range, mild hyperextension at IP joint.  PROM WNL.  Otherwise thumb AROM WNL    Left Digits    Normal active range of motion    Right Digits   Normal active range of motion    Strength     Left Wrist/Hand   Wrist extension: 4  Wrist flexion: 4  Radial deviation: 4  Ulnar deviation: 4     (2nd hand  position)     Trial 1: 30    Right Wrist/Hand   Wrist extension: 4  Wrist flexion: 4  Radial deviation: 4  Ulnar deviation: 4     (2nd hand position)     Trial 1: 40    Additional Strength Details  Thumb strength 3+5 except thumb extension 3-/5    Tests     Left Wrist/Hand   Positive CMC grind and extrinsic flexor tightness.   Negative distal radial-ulnar joint stress, extrinsic extensor tightness, Finkelstein's, intrinsic muscle tightness, Phalen's sign, TFCC load and Tinel's sign (median nerve).     Right Wrist/Hand   Positive CMC grind and extrinsic flexor tightness.   Negative distal radial-ulnar joint stress, extrinsic extensor tightness, Finkelstein's, intrinsic muscle tightness, Phalen's sign, TFCC load and Tinel's sign (median nerve).     Left Elbow   Negative elbow flexion and Tinel's sign (cubital tunnel).     Right Elbow   Negative elbow flexion and Tinel's sign (cubital tunnel).         Therapeutic Exercises (CPT 31559):     1. BH    Therapeutic Exercise Summary:  Stretching of bilateral thumbs followed by active movements.  Further assessment of BUE.      Time-based treatments/modalities:  Therapeutic exercise minutes (CPT 25316): 15 minutes        Assessment and Plan:   Problem list/assessment: abnormal or restricted ROM, decreased coordination, decreased HEP knowledge, decreased sensation, decreased strength, limited ADL's and pain    Assessment details:  Pt is a pleasant 69 y.o female who presents to outpatient OT functioning below baseline secondary to decreased  strength, decreased thumb extension, soft tissue restrictions, sensory abnormalities, progressive bilateral CMC arthritic pain, left worse than right, and bilateral wrist pain which is affecting her ability to perform ADL and IADL activities which require gripping, twisting and dexterity.      Goals:   Short Term Goals: decrease pain, increase ADL independence, increase range of motion, increase soft tissue/skin mobility, increase  strength and independent with HEP performance  Short term goal timespan:  2-4 weeks    Long Term Goals:   Pt will perform ADLs, IADLs and recreational activities with <= 1/10 pain and use of AE/techniques to protect joints  Pt will increase her bilateral  strength >= 5 lbs for opening jars and carrying grocery bags  Pt will be independent positioning and non-pharmacological pain management techniques   Pt will independently incorporate principles of joint protection and body mechanics into her daily routine.  Pt will be independent HEP for stretching and strengthening  Pt will score >= 71/80 on the UEFI    Long term goal timespan:  4-6 weeks    Plan:   Occupational/Hand Therapy options:  Occupational therapy treatment to continue  Planned therapy interventions:  Adaptive training to increase functional performance, home exercise training, manual therapy (CPT 58763), pain management, splinting/orthosis, patient/family/caregiver education, graded resistive tasks to increase strength, AROM, A/AROM, PROM, passive stretch and fine motor coordination training  Other planned therapy interventions:  Contrast baths  Planned modality interventions: moist hot pack (CPT 76575) and paraffin treatment (CPT 92995)  Prognosis: good    Frequency: 1-2 x week.  Duration in weeks:  6  Duration in visits:  6  Discussed with:  Patient  Patient/caregiver understanding of therapy treatment and goals: Good understanding of therapy treatment and goals      Functional Assessment Used  OT Functional Assessment Tool Used: UEFI  OT Functional Assessment Score: 62/80     Referring provider co-signature:  I have reviewed this plan of care and my co-signature certifies the need for services.  Certification Dates:   From 10/09/19     To 12/19/19    Physician Signature: ________________________________ Date: ______________

## 2019-10-14 ENCOUNTER — APPOINTMENT (OUTPATIENT)
Dept: OCCUPATIONAL THERAPY | Facility: REHABILITATION | Age: 69
End: 2019-10-14
Payer: MEDICARE

## 2019-10-14 ENCOUNTER — OCCUPATIONAL THERAPY (OUTPATIENT)
Dept: OCCUPATIONAL THERAPY | Facility: REHABILITATION | Age: 69
End: 2019-10-14
Attending: FAMILY MEDICINE
Payer: MEDICARE

## 2019-10-14 DIAGNOSIS — M18.0 PRIMARY OSTEOARTHRITIS OF BOTH FIRST CARPOMETACARPAL JOINTS: ICD-10-CM

## 2019-10-14 DIAGNOSIS — M79.7 FIBROMYALGIA: ICD-10-CM

## 2019-10-14 PROCEDURE — 97140 MANUAL THERAPY 1/> REGIONS: CPT

## 2019-10-14 PROCEDURE — 97110 THERAPEUTIC EXERCISES: CPT

## 2019-10-14 NOTE — OP THERAPY DAILY TREATMENT
"  Outpatient Occupational Therapy  DAILY TREATMENT     [facility] Occupational [address]  [address]  [place] [address]  Phone:  [phone]  Fax:  [phone]    Date: 10/14/2019    Patient: Elizabeth Murphy  YOB: 1950  MRN: [id number]     Time Calculation  Start time: 1100  Stop time: 1130 Time Calculation (min): 30 minutes       Chief Complaint: Hand Problem (bilateral CMC joint arthritis, pain)    Visit #: 2    SUBJECTIVE: \"My thumbs were sore over the weekend\"    OBJECTIVE:  Current objective measures:    strength: Left: 40lbs, Right: 45 lbs        Therapeutic Exercises (CPT 01505):     1. Bilateral wrist    Therapeutic Exercise Summary:  Instructed on and performed bilateral self wrist stretches into flexion/extension x 30 seconds with elbows extended x 2 reps each, bilateral flexor stretch x 30 seconds.  Measured for Push MetaGrip Thumb CMC brace.  Proximal to MCP joints circumferentially: Left 7.25 inches,  Right 7.75 inches.  Pt to purchase small left brace.      Therapeutic Treatments and Modalities:    1. Manual Therapy (CPT 02265)    Therapeutic Treatments and Modalities Summary: STM to bilateral extrinsic flexors combined with active digit/wrist flexion/extension x 20 reps for release of soft tissue adhesions, gentle wrist traction.    Time-based treatments/modalities:  Therapeutic exercise minutes (CPT 40249): 15 minutes  Manual therapy joint mobilization minutes (CPT 80467): 15 minutes      ASSESSMENT:   Response to treatment:  Pt making progress with her bilateral hand strength, soft tissue mobility of extrinsic flexors, and flexibility in response to manual therapy and graded stretches.  Pt to purchase recommended CMC brace for left thumb.  Issued written HEP with good understanding.    PLAN/RECOMMENDATIONS:   Plan for treatment: therapy treatment to continue next visit.  Planned interventions for next visit: continue with current treatment, manual " therapy (CPT 10794), paraffin bath (CPT 89166) and therapeutic exercise (CPT 28004)

## 2019-10-15 ENCOUNTER — OFFICE VISIT (OUTPATIENT)
Dept: MEDICAL GROUP | Facility: CLINIC | Age: 69
End: 2019-10-15
Payer: MEDICARE

## 2019-10-15 VITALS — BODY MASS INDEX: 26.8 KG/M2 | HEIGHT: 64 IN | WEIGHT: 157 LBS

## 2019-10-15 DIAGNOSIS — M19.171 POST-TRAUMATIC OSTEOARTHRITIS OF RIGHT FOOT: ICD-10-CM

## 2019-10-15 PROCEDURE — 20606 DRAIN/INJ JOINT/BURSA W/US: CPT | Performed by: FAMILY MEDICINE

## 2019-10-15 RX ORDER — TRIAMCINOLONE ACETONIDE 40 MG/ML
20 INJECTION, SUSPENSION INTRA-ARTICULAR; INTRAMUSCULAR ONCE
Status: COMPLETED | OUTPATIENT
Start: 2019-10-15 | End: 2019-10-15

## 2019-10-15 RX ADMIN — TRIAMCINOLONE ACETONIDE 20 MG: 40 INJECTION, SUSPENSION INTRA-ARTICULAR; INTRAMUSCULAR at 13:37

## 2019-10-15 NOTE — PROGRESS NOTES
1. Post-traumatic osteoarthritis of right foot  triamcinolone acetonide (KENALOG-40) injection 20 mg       Second tarsometatarsal joint corticosteroid injection performed in the office TODAY (October 15, 2019) under ultrasound guidance    Return in about 4 weeks (around 11/12/2019).  To see how she is doing after RIGHT foot second tarsometatarsal joint corticosteroid injection

## 2019-10-15 NOTE — PROCEDURES
PROCEDURE NOTE:  right FOOT second tarsometatarsal joint corticosteroid injection under ultrasound guidance  Risks and benefits discussed  Informed consent obtained  Ankle prepped in sterile fashion utilizing a pieter- lateral approach  20 mg of Kenalog and 0.5 cc of Marcaine injected into the second tarsometatarsal joint utilizing an indirect approach  Vapocoolant spray was utilized  Patient tolerated the procedure well  Postprocedure care and red flags discussed

## 2019-10-16 ENCOUNTER — APPOINTMENT (OUTPATIENT)
Dept: OCCUPATIONAL THERAPY | Facility: REHABILITATION | Age: 69
End: 2019-10-16
Payer: MEDICARE

## 2019-10-16 RX ORDER — GABAPENTIN 800 MG/1
TABLET ORAL
Qty: 270 TAB | Refills: 1 | Status: SHIPPED | OUTPATIENT
Start: 2019-10-16 | End: 2022-07-08

## 2019-10-17 ENCOUNTER — OCCUPATIONAL THERAPY (OUTPATIENT)
Dept: OCCUPATIONAL THERAPY | Facility: REHABILITATION | Age: 69
End: 2019-10-17
Attending: FAMILY MEDICINE
Payer: MEDICARE

## 2019-10-17 DIAGNOSIS — M18.0 PRIMARY OSTEOARTHRITIS OF BOTH FIRST CARPOMETACARPAL JOINTS: ICD-10-CM

## 2019-10-17 PROCEDURE — 97018 PARAFFIN BATH THERAPY: CPT | Mod: XU

## 2019-10-17 PROCEDURE — 97140 MANUAL THERAPY 1/> REGIONS: CPT

## 2019-10-17 NOTE — OP THERAPY DAILY TREATMENT
"  Outpatient Occupational Therapy  DAILY TREATMENT     [facility] Occupational Therapy [address].  [address]  [place] [address]  Phone:  [phone]  Fax:  [phone]    Date: 10/17/2019    Patient: Elizabeth Murphy  YOB: 1950  MRN: [id number]     Time Calculation  Start time: 0300  Stop time: 0330 Time Calculation (min): 30 minutes       Chief Complaint: Hand Problem (bilateral CMC joint arthritis, pain)    Visit #: 3    SUBJECTIVE: \"My hands are feeling better, the stretching exercises seemed to really help\"    OBJECTIVE:  Current objective measures:   Pain: Right thumb 1/10, Left thumb 0/10        Therapeutic Treatments and Modalities:    1. Paraffin Treatment (CPT 85086)    2. Manual Therapy (CPT 64431)    Therapeutic Treatments and Modalities Summary: Paraffin bath BH x 12 minutes with a positive response of increased flexibility and comfort.  Given information on catalog for arthritis related AE/paraffin, pt took photo of catalog.  STM to bilateral extrinsic flexors combined with active digit flexion/extension x 20 reps for release of soft tissue adhesions, gentle wrist traction.  Self-stretches of bilateral wrists into extension x 30 second holds.    Time-based treatments/modalities:  Manual therapy joint mobilization minutes (CPT 59491): 18 minutes        ASSESSMENT:   Response to treatment:  Pt making good progress today with her bilateral extrinsic flexor soft tissue mobility and flexibility for her daily routine in response to manual techniques and trial of paraffin bath with minimal reports of CMC discomfort overall.  Discussed alternatives to recommended CMC orthosis as it is not currently in her budget.  Continue with current HEP.    PLAN/RECOMMENDATIONS:   Plan for treatment: therapy treatment to continue next visit.  Planned interventions for next visit: continue with current treatment, manual therapy (CPT 67716) and therapeutic exercise (CPT 01786)       "

## 2019-10-21 ENCOUNTER — OCCUPATIONAL THERAPY (OUTPATIENT)
Dept: OCCUPATIONAL THERAPY | Facility: REHABILITATION | Age: 69
End: 2019-10-21
Attending: FAMILY MEDICINE
Payer: MEDICARE

## 2019-10-21 DIAGNOSIS — M18.0 PRIMARY OSTEOARTHRITIS OF BOTH FIRST CARPOMETACARPAL JOINTS: ICD-10-CM

## 2019-10-21 PROCEDURE — 97140 MANUAL THERAPY 1/> REGIONS: CPT

## 2019-10-21 NOTE — OP THERAPY DAILY TREATMENT
"  Outpatient Occupational Therapy  DAILY TREATMENT     [facility] Occupational Therapy [address].  [address]  [place] [address]  Phone:  [phone]  Fax:  [phone]    Date: 10/21/2019    Patient: Elizabeth Murphy  YOB: 1950  MRN: [id number]     Time Calculation  Start time: 0330  Stop time: 0400 Time Calculation (min): 30 minutes       Chief Complaint: Hand Problem (bilateral CMC joint arthritis, pain)    Visit #: 4    SUBJECTIVE: \"There is some burning type of pain both hands today\"    OBJECTIVE:  Current objective measures:   Pain: 2/10 bilateral thumbs  +CMC Grind Test Bilaterally        Therapeutic Treatments and Modalities:    1. Manual Therapy (CPT 04600)    Therapeutic Treatments and Modalities Summary: Measured for Comfort Cool CMC restriction splint for medium right, pt took photo and will order.  STM to bilateral extrinsic flexors with and without Guasha tool combined with active digit/wrist flexion/extension x 20 reps for release of soft tissue adhesions, gentle wrist traction.  STM to muscle bellies of EPB combined with active flexion/extension.  Instructed on and performed self-STM to palmar hand, thenar musculature, and web space using golf ball table top with a positive response.  To perform 1 x day 5 minutes each hand.    Time-based treatments/modalities:  Manual therapy joint mobilization minutes (CPT 64087): 30 minutes        ASSESSMENT:   Response to treatment:  Pt making steady progress with her bilateral soft tissue mobility and flexibility in response to manual therapy combined with active movements.  Pt reported she was no longer experiencing dysesthesias ria-CMC joints following today's session.  HEP updated with good understanding.    PLAN/RECOMMENDATIONS:   Plan for treatment: therapy treatment to continue next visit.  Planned interventions for next visit: continue with current treatment, manual therapy (CPT 11545), orthotic training (CPT 40618) and " therapeutic exercise (CPT 47083)

## 2019-10-23 ENCOUNTER — APPOINTMENT (OUTPATIENT)
Dept: OCCUPATIONAL THERAPY | Facility: REHABILITATION | Age: 69
End: 2019-10-23
Attending: FAMILY MEDICINE
Payer: MEDICARE

## 2019-10-28 ENCOUNTER — OCCUPATIONAL THERAPY (OUTPATIENT)
Dept: OCCUPATIONAL THERAPY | Facility: REHABILITATION | Age: 69
End: 2019-10-28
Attending: FAMILY MEDICINE
Payer: MEDICARE

## 2019-10-28 DIAGNOSIS — M18.0 PRIMARY OSTEOARTHRITIS OF BOTH FIRST CARPOMETACARPAL JOINTS: ICD-10-CM

## 2019-10-28 PROCEDURE — 97034 APP MDLTY 1+CNTRST BTH EA 15: CPT

## 2019-10-28 PROCEDURE — 97110 THERAPEUTIC EXERCISES: CPT

## 2019-10-28 NOTE — OP THERAPY DAILY TREATMENT
"  Outpatient Occupational Therapy  DAILY TREATMENT     [facility] Occupational [address].  [address]  [place] [address]  Phone:  [phone]  Fax:  [phone]    Date: 10/28/2019    Patient: Elizabeth Murphy  YOB: 1950  MRN: [id number]     Time Calculation  Start time: 0100  Stop time: 0130 Time Calculation (min): 30 minutes       Chief Complaint: Hand Problem (bilateral CMC joint arthritis, pain)    Visit #: 5    SUBJECTIVE: \"My hands are feeling good\"    OBJECTIVE:  Current objective measures:   CMC Grind Test: positive bilaterally R > L        Therapeutic Exercises (CPT 28979):     1. Bilateral thumbs    Therapeutic Exercise Summary:  Instructed on and performed bilateral thumb isometrics for extension/flexion/radial abduction/adduction 10 reps with 5 second holds.  Active thumb extension hand flat on table x 10 reps.  STM to muscle bellies of EPBs.      Therapeutic Treatments and Modalities:    1. Contrast Bath (CPT 87794)    Therapeutic Treatments and Modalities Summary: Contrast baths hot/cold: 3/1, 2/1, 1/1 minute with a positive response    Time-based treatments/modalities:  Therapeutic exercise minutes (CPT 43430): 20 minutes  Contrast bath minutes (CPT 60358): 10 minutes     ASSESSMENT:   Response to treatment:  Pt making good progress today with her bilateral thumb strength, soft tissue mobility, and flexibility in response to contrast baths and isometrics with reports of improved comfort and flexibility following interventions.  HEP updated with good understanding.    PLAN/RECOMMENDATIONS:   Plan for treatment: therapy treatment to continue next visit.  Planned interventions for next visit: continue with current treatment, manual therapy (CPT 02817), orthotic training (CPT 27327) and therapeutic exercise (CPT 32804)       "

## 2019-10-30 ENCOUNTER — APPOINTMENT (OUTPATIENT)
Dept: OCCUPATIONAL THERAPY | Facility: REHABILITATION | Age: 69
End: 2019-10-30
Attending: FAMILY MEDICINE
Payer: MEDICARE

## 2019-11-07 ENCOUNTER — OCCUPATIONAL THERAPY (OUTPATIENT)
Dept: OCCUPATIONAL THERAPY | Facility: REHABILITATION | Age: 69
End: 2019-11-07
Attending: FAMILY MEDICINE
Payer: MEDICARE

## 2019-11-07 DIAGNOSIS — M18.0 PRIMARY OSTEOARTHRITIS OF BOTH FIRST CARPOMETACARPAL JOINTS: ICD-10-CM

## 2019-11-07 PROCEDURE — 97110 THERAPEUTIC EXERCISES: CPT

## 2019-11-08 NOTE — OP THERAPY DAILY TREATMENT
"  Outpatient Occupational Therapy  DAILY TREATMENT     [facility] Occupational Therapy [address].  [address]  [place] [address]  Phone:  [phone]  Fax:  [phone]    Date: 11/07/2019    Patient: Elizabeth Murphy  YOB: 1950  MRN: [id number]     Time Calculation  Start time: 0500  Stop time: 0530 Time Calculation (min): 30 minutes       Chief Complaint: Hand Problem (bilateral CMC joint arthritis, pain)    Visit #: 6    SUBJECTIVE: \"My hands have been feeling so much better\"    OBJECTIVE:  Current objective measures:   CMC Grind Test: positive bilaterally R > L  Pain: right 1/10, left 0/10.  No reports of burning sensation ulnar wrist.   strength: Left: 51lbs; Right: 60lbs  Thumb strength: bilateral flexion 4/5, bilateral radial abduction 4/5, extension right 4-/5, left 3-/5  Bilateral wrist flexion/extension strength: 5/5  Soft tissue mobility: mild tenderness right ulnar volar forearm, otherwise WNL  ADLs/IADLs/recreational activities: Modified independent    Joint protection: pt educated on use of power  rather than pincer grasp to open bottles/containers, issued Dycem.  Pt has information on paraffin and AE for arthritis.  Positioning: pt ordered Comfort Cool Thumb CMC brace to be worn during activities which require a strong grasp and/or repetitive motions.  HEP: independent     OT Functional Assessment Tool Used: UEFI  OT Functional Assessment Score: 79/80     Therapeutic Exercises (CPT 53953):     1. BH    Therapeutic Exercise Summary:  Reviewed and/or performed examples of all exercises in her HEP including: self wrist stretches into flexion/extension, bilateral wrist flexor stretches, thumb stretches/AROM exercises, active opposition, self-STM using golf ball to palmar hand and web space, thumb isometrics, and contrast baths.      Time-based treatments/modalities:  Therapeutic exercise minutes (CPT 49720): 30 minutes        ASSESSMENT:   Response to treatment:  " Pt has actively participated in skilled OT program and has made excellent progress to fully meet the goals set in her initial plan of care.  Pt has made steady gains with her bilateral  strength, soft tissue mobility, and thumb flexibility/strength for her ADLs, IADLs, and recreational activities with minimal to no reports of pain and no further sensory abnormalities.  Pt is independent with her HEP and is safe for d/c.    PLAN/RECOMMENDATIONS:   Plan for treatment: discharge patient due to accomplished goals.  Planned interventions for next visit: D/C to HEP.  D/C OT.

## 2019-11-08 NOTE — OP THERAPY DISCHARGE SUMMARY
Outpatient Occupational Therapy  DISCHARGE SUMMARY NOTE    [facility] Occupational Therapy [address].  [address]  [place] [address]  Phone:  [phone]  Fax:  [phone]    Date of Visit: 11/07/2019    Patient: Elizabeth Murphy  YOB: 1950  MRN: [id number]     Referring Provider: Gerson Wood M.D.  [address] [place] [address] [place] [address]   Referring Diagnosis: Bilateral primary osteoarthritis of first carpometacarpal joints [M18.0]     Occupational Therapy Occurrence Codes    Date of onset of impairment:  6/15/18   Date of occupational therapy care plan established or reviewed:  10/9/19   Date of occupational therapy treatment started:  10/9/19          Functional Assessment Used  OT Functional Assessment Tool Used: UEFI  OT Functional Assessment Score: 79/80     Your patient is being discharged from Occupational Therapy with the following comments:   · Goals met    Comments:  Pt has actively participated in skilled OT program and has made excellent progress to fully meet the goals set in her initial plan of care.  Pt has made steady gains with her bilateral  strength, soft tissue mobility, and thumb flexibility/strength for her ADLs, IADLs, and recreational activities with minimal to no reports of pain and no further sensory abnormalities.  Pt is independent with her HEP and is safe for d/c.    Limitations Remaining:  See daily note from 11/7/19 for details.    Recommendations:  D/C to HEP.  D/C OT.    Pawel Eddy MS,OTR/L    Date: 11/8/2019

## 2019-11-14 ENCOUNTER — OFFICE VISIT (OUTPATIENT)
Dept: MEDICAL GROUP | Facility: CLINIC | Age: 69
End: 2019-11-14
Payer: MEDICARE

## 2019-11-14 VITALS
DIASTOLIC BLOOD PRESSURE: 76 MMHG | WEIGHT: 157 LBS | SYSTOLIC BLOOD PRESSURE: 118 MMHG | BODY MASS INDEX: 26.8 KG/M2 | TEMPERATURE: 98.3 F | HEIGHT: 64 IN | RESPIRATION RATE: 16 BRPM | OXYGEN SATURATION: 97 % | HEART RATE: 78 BPM

## 2019-11-14 DIAGNOSIS — H00.011 HORDEOLUM EXTERNUM OF RIGHT UPPER EYELID: ICD-10-CM

## 2019-11-14 DIAGNOSIS — M19.171 POST-TRAUMATIC OSTEOARTHRITIS OF RIGHT FOOT: ICD-10-CM

## 2019-11-14 DIAGNOSIS — M18.0 OSTEOARTHRITIS OF CARPOMETACARPAL (CMC) JOINT OF BOTH THUMBS: ICD-10-CM

## 2019-11-14 PROCEDURE — 99214 OFFICE O/P EST MOD 30 MIN: CPT | Performed by: FAMILY MEDICINE

## 2019-11-14 RX ORDER — ERYTHROMYCIN 5 MG/G
1 OINTMENT OPHTHALMIC 3 TIMES DAILY
Qty: 1 TUBE | Refills: 0 | Status: SHIPPED | OUTPATIENT
Start: 2019-11-14 | End: 2022-03-14

## 2019-11-14 NOTE — PROGRESS NOTES
"  Subjective:      UP wrist Pain (Referral from PCP/ Bilateral wrist pain )     Referred by TERRI Serna for evaluation of BILATERAL base of thumb pain    HPI     She has here for FOLLOW-up for her RIGHT foot pain  Sinus Tarsi corticosteroid injection HELPED, now she is mostly having pain at the midfoot  Pain at the dorsum of the foot as well as noticing that her arch is falling    BILATERAL base of thumb pain (right-hand-dominant) RIGHT continues to be worse than the left  She continues to attend therapy for this which is HELPING  Apparently, the corticosteroid injections she received in the CMC joints back in February 2019 have worn off  SECOND metatarsal base corticosteroid injection under ultrasound guidance HELPED, she is approximately 80% improved     Objective:     /76 (BP Location: Right arm, Patient Position: Sitting, BP Cuff Size: Adult)   Pulse 78   Temp 36.8 °C (98.3 °F) (Temporal)   Resp 16   Ht 1.626 m (5' 4\")   Wt 71.2 kg (157 lb)   SpO2 97%   BMI 26.95 kg/m²       Physical Exam     Hand exam    NAD  Alert and oriented    RIGHT FOOT:  There is NO swelling noted at the foot  There is NO tenderness at the base of the fifth metatarsal, cuboid, or tarsal navicular  There is NO pain with metatarsal squeeze test  MINIMAL tenderness at the tarsometatarsal joint at the SECOND metatarsal base    NEUTRAL stance  Able to ambulate with NORMAL gait     Assessment/Plan:     1. Post-traumatic osteoarthritis of right foot     2. Osteoarthritis of carpometacarpal (CMC) joint of both thumbs     3. Hordeolum externum of right upper eyelid  erythromycin 5 MG/GM Ointment     At the end of visit, patient mentioned that she had swelling and irritation of the RIGHT eyelid  No fever, no chills, no drainage, mild irritation.  No injury, insidious onset  HORDEOLUM, erythromycin ointment    RIGHT foot and lateral ankle symptoms due to sinus Tarsi syndrome IMPROVED after corticosteroid " injection    SECOND tarsometatarsal joint at the base of the second metatarsal  Second tarsometatarsal joint corticosteroid injection performed (October 15, 2019) under ultrasound guidance HELPED significantly as she is approximately 80% improved    Sinus Tarsi corticosteroid injection the RIGHT ankle performed (September 6, 2019) HELPED    Follow-up as needed                5/3/2019 2:50 PM    HISTORY/REASON FOR EXAM:  Atraumatic Pain/Swelling/Deformity  Chronic worsening RIGHT foot pain.    TECHNIQUE/EXAM DESCRIPTION AND NUMBER OF VIEWS:  4 views of the RIGHT foot obtained weightbearing.    COMPARISON:  None.    FINDINGS: Narrowing of the interphalangeal joints.  Degenerative change of RIGHT midfoot.  Widening is seen of the joint space between the base of 1st and 2nd metatarsals with calcification present.  Step-off between the 2nd metatarsal base and middle cuneiform.  No fracture identified.  Deformity the base of 2nd metatarsal suggests prior injury.  No focal soft tissue swelling   Impression       1.  Moderate degenerative change of RIGHT foot primarily involving the midfoot with evidence of prior trauma at the base of 2nd metatarsal.  2.  Step-off between the RIGHT 2nd metatarsal base and middle cuneiform suggests chronic Lisfranc type injury with widening of the interspace between the 1st and 2nd metatarsal bases.     Interpreted in the office today with the patient          2/1/2019 2:16 PM    HISTORY/REASON FOR EXAM:  Left hand pain.    TECHNIQUE/EXAM DESCRIPTION AND NUMBER OF VIEWS:  3 views of the LEFT hand.    COMPARISON: None.    FINDINGS:    BONE MINERALIZATION: Normal.  JOINTS: Mild to moderate STT and CMC-1 osteoarthrosis. Mild multifocal interphalangeal osteoarthrosis. No erosions.  FRACTURE: None.  DISLOCATION: None.  SOFT TISSUES: No mass.   Impression       1.  Mild to moderate STT and CMC-1 osteoarthrosis. Mild multifocal interphalangeal osteoarthrosis.  2.  No acute osseous abnormality.            2/1/2019 2:13 PM    HISTORY/REASON FOR EXAM:  Right hand pain.    TECHNIQUE/EXAM DESCRIPTION AND NUMBER OF VIEWS:  3 views of the RIGHT hand.    COMPARISON: 4/16/2017    FINDINGS:    BONE MINERALIZATION: Normal.  JOINTS: Mild multifocal osteoarthrosis. No erosions.  FRACTURE: None.  DISLOCATION: None.  SOFT TISSUES: No mass.   Impression       Mild multifocal osteoarthrosis. No acute osseous abnormality.     Thank you Elizabeth Babcock A.P.R.N. for allowing me to participate in care for your patient.

## 2019-11-26 DIAGNOSIS — E78.00 PURE HYPERCHOLESTEROLEMIA: ICD-10-CM

## 2019-11-27 RX ORDER — ATORVASTATIN CALCIUM 40 MG/1
TABLET, FILM COATED ORAL
Qty: 100 TAB | Refills: 2 | Status: SHIPPED | OUTPATIENT
Start: 2019-11-27

## 2019-11-27 NOTE — TELEPHONE ENCOUNTER
Pt has had OV within the 12 month protocol and lipid panel is current. 9 month supply sent to pharmacy.   Lab Results   Component Value Date/Time    CHOLSTRLTOT 186 08/27/2019 09:21 AM     (H) 08/27/2019 09:21 AM    HDL 57 08/27/2019 09:21 AM    TRIGLYCERIDE 103 08/27/2019 09:21 AM       Lab Results   Component Value Date/Time    SODIUM 135 08/27/2019 09:21 AM    POTASSIUM 4.3 08/27/2019 09:21 AM    CHLORIDE 99 08/27/2019 09:21 AM    CO2 29 08/27/2019 09:21 AM    GLUCOSE 108 (H) 08/27/2019 09:21 AM    BUN 20 08/27/2019 09:21 AM    CREATININE 0.96 08/27/2019 09:21 AM    CREATININE 0.8 02/27/2009 02:02 PM     Lab Results   Component Value Date/Time    ALKPHOSPHAT 79 08/27/2019 09:21 AM    ASTSGOT 14 08/27/2019 09:21 AM    ALTSGPT 15 08/27/2019 09:21 AM    TBILIRUBIN 0.4 08/27/2019 09:21 AM

## 2019-12-21 ENCOUNTER — TELEPHONE (OUTPATIENT)
Dept: URGENT CARE | Facility: CLINIC | Age: 69
End: 2019-12-21

## 2019-12-22 NOTE — TELEPHONE ENCOUNTER
The patient called Sports Medicine 12/20/19 @ 1315, left a voicemail re: making an appointment with Dr. Wood for bilateral thumb and foot pain. I checked the voicemail today and called the patient at [phone] (12/21 @ 9190), stating with the holidays, our next available appointment would be 1/2/2020 and I wanted to discuss her insurance with the patient. I asked the patient to call our clinic back at [phone].  Jacqueline Kauffman

## 2019-12-23 ENCOUNTER — APPOINTMENT (OUTPATIENT)
Dept: BEHAVIORAL HEALTH | Facility: CLINIC | Age: 69
End: 2019-12-23
Payer: MEDICARE

## 2019-12-26 ENCOUNTER — OFFICE VISIT (OUTPATIENT)
Dept: MEDICAL GROUP | Facility: CLINIC | Age: 69
End: 2019-12-26
Payer: MEDICARE

## 2019-12-26 VITALS
HEIGHT: 64 IN | HEART RATE: 76 BPM | DIASTOLIC BLOOD PRESSURE: 76 MMHG | WEIGHT: 157 LBS | RESPIRATION RATE: 16 BRPM | SYSTOLIC BLOOD PRESSURE: 118 MMHG | TEMPERATURE: 98.3 F | OXYGEN SATURATION: 93 % | BODY MASS INDEX: 26.8 KG/M2

## 2019-12-26 DIAGNOSIS — M18.0 OSTEOARTHRITIS OF CARPOMETACARPAL (CMC) JOINT OF BOTH THUMBS: ICD-10-CM

## 2019-12-26 DIAGNOSIS — M25.571 SINUS TARSI SYNDROME OF RIGHT ANKLE: ICD-10-CM

## 2019-12-26 PROCEDURE — 20610 DRAIN/INJ JOINT/BURSA W/O US: CPT | Mod: RT | Performed by: FAMILY MEDICINE

## 2019-12-26 PROCEDURE — 20604 DRAIN/INJ JOINT/BURSA W/US: CPT | Mod: 50 | Performed by: FAMILY MEDICINE

## 2019-12-26 RX ORDER — TRIAMCINOLONE ACETONIDE 40 MG/ML
20 INJECTION, SUSPENSION INTRA-ARTICULAR; INTRAMUSCULAR ONCE
Status: COMPLETED | OUTPATIENT
Start: 2019-12-26 | End: 2019-12-26

## 2019-12-26 RX ORDER — TRIAMCINOLONE ACETONIDE 40 MG/ML
40 INJECTION, SUSPENSION INTRA-ARTICULAR; INTRAMUSCULAR ONCE
Status: DISCONTINUED | OUTPATIENT
Start: 2019-12-26 | End: 2019-12-26

## 2019-12-26 RX ADMIN — TRIAMCINOLONE ACETONIDE 20 MG: 40 INJECTION, SUSPENSION INTRA-ARTICULAR; INTRAMUSCULAR at 16:01

## 2019-12-26 RX ADMIN — TRIAMCINOLONE ACETONIDE 20 MG: 40 INJECTION, SUSPENSION INTRA-ARTICULAR; INTRAMUSCULAR at 15:59

## 2019-12-26 RX ADMIN — TRIAMCINOLONE ACETONIDE 20 MG: 40 INJECTION, SUSPENSION INTRA-ARTICULAR; INTRAMUSCULAR at 15:58

## 2019-12-26 NOTE — PROCEDURES
PROCEDURE NOTE:  right Ankle sinus Tarsi corticosteroid injection   risks and benefits discussed  Informed consent obtained  Ankle prepped in sterile fashion utilizing a pieter-medial approach  25-gauge needle was utilized to inject   20 mg of Kenalog and 0.5 cc of Marcaine injected into the Ankle at the sinus Tarsi   Vapocoolant spray was utilized  Patient tolerated the procedure well  Postprocedure care and red flags discussed      BILATERAL CMC joint corticosteroid injection   BILATERAL PROCEDURE NOTE:  BILATERAL HAND CMC joint intra-articular corticosteroid injection  Risks and benefits discussed  Informed consent obtained  Hand prepped in sterile fashion utilizing   20 mg of Kenalog and 0.5 cc of Marcaine injected into the CMC joint of BOTH wrists  Vapocoolant spray was utilized  Patient tolerated the procedure well  Postprocedure care and red flags discussed

## 2019-12-26 NOTE — PROGRESS NOTES
"  Subjective:      UP wrist Pain (Referral from PCP/ Bilateral wrist pain )     Referred by TERRI Serna for evaluation of BILATERAL base of thumb pain    HPI     She has here for FOLLOW-up for her RIGHT foot pain  Sinus Tarsi corticosteroid injection HELPED, now she is mostly having pain at the midfoot  Pain at the dorsum of the foot as well as noticing that her arch is falling    BILATERAL base of thumb pain (right-hand-dominant) RIGHT continues to be worse than the left  She continues to attend therapy for this which is HELPING  Apparently, the corticosteroid injections she received in the CMC joints back in February 2019 have worn off  SECOND metatarsal base corticosteroid injection under ultrasound guidance HELPED, she is approximately 80% improved     Objective:     /76 (BP Location: Left arm, Patient Position: Sitting, BP Cuff Size: Adult)   Pulse 76   Temp 36.8 °C (98.3 °F) (Temporal)   Resp 16   Ht 1.626 m (5' 4\")   Wt 71.2 kg (157 lb)   SpO2 93%   BMI 26.95 kg/m²       Physical Exam     Hand exam    NAD  Alert and oriented    RIGHT ANKLE:  There is NO swelling noted at the ankle  Range of motion intact with dorsiflexion and plantarflexion, inversion and eversion  There is NO tenderness of the ATFL, CF or PTF ligament  There is NO tenderness of the lateral malleolus or medial malleolus  Anterior drawer testing is NEGATIVE  Talar tilt testing is NEGATIVE  POSITIVE tenderness at the sinus Tarsi  The foot and ankle is otherwise neurovascularly intact    RIGHT FOOT:  There is NO swelling noted at the foot  There is NO tenderness at the base of the fifth metatarsal, cuboid, or tarsal navicular  There is NO pain with metatarsal squeeze test    NEUTRAL stance  Able to ambulate with mild antalgic gait    BILATERAL hand exam  POSITIVE tenderness at BOTH CMC joints     Assessment/Plan:     1. Osteoarthritis of carpometacarpal (CMC) joint of both thumbs  triamcinolone acetonide (KENALOG-40) " injection 20 mg    triamcinolone acetonide (KENALOG-40) injection 40 mg   2. Sinus tarsi syndrome of right ankle  triamcinolone acetonide (KENALOG-40) injection 20 mg     RIGHT foot and lateral ankle symptoms due to sinus Tarsi syndrome IMPROVED after corticosteroid injection    SECOND tarsometatarsal joint at the base of the second metatarsal  Second tarsometatarsal joint corticosteroid injection performed (October 15, 2019) under ultrasound guidance HELPED significantly as she is approximately 80% improved    Sinus Tarsi corticosteroid injection the RIGHT ankle performed (September 6, 2019) HELPED  REPEAT Sinus Tarsi corticosteroid injection the RIGHT ankle performed in the office TODAY (December 26, 2019)     BILATERAL ultrasound-guided CMC corticosteroid injections performed in the office TODAY (December 26, 2019)    To see how she is doing after injections or she can follow-up as needed                5/3/2019 2:50 PM    HISTORY/REASON FOR EXAM:  Atraumatic Pain/Swelling/Deformity  Chronic worsening RIGHT foot pain.    TECHNIQUE/EXAM DESCRIPTION AND NUMBER OF VIEWS:  4 views of the RIGHT foot obtained weightbearing.    COMPARISON:  None.    FINDINGS: Narrowing of the interphalangeal joints.  Degenerative change of RIGHT midfoot.  Widening is seen of the joint space between the base of 1st and 2nd metatarsals with calcification present.  Step-off between the 2nd metatarsal base and middle cuneiform.  No fracture identified.  Deformity the base of 2nd metatarsal suggests prior injury.  No focal soft tissue swelling   Impression       1.  Moderate degenerative change of RIGHT foot primarily involving the midfoot with evidence of prior trauma at the base of 2nd metatarsal.  2.  Step-off between the RIGHT 2nd metatarsal base and middle cuneiform suggests chronic Lisfranc type injury with widening of the interspace between the 1st and 2nd metatarsal bases.     Interpreted in the office today with the  patient          2/1/2019 2:16 PM    HISTORY/REASON FOR EXAM:  Left hand pain.    TECHNIQUE/EXAM DESCRIPTION AND NUMBER OF VIEWS:  3 views of the LEFT hand.    COMPARISON: None.    FINDINGS:    BONE MINERALIZATION: Normal.  JOINTS: Mild to moderate STT and CMC-1 osteoarthrosis. Mild multifocal interphalangeal osteoarthrosis. No erosions.  FRACTURE: None.  DISLOCATION: None.  SOFT TISSUES: No mass.   Impression       1.  Mild to moderate STT and CMC-1 osteoarthrosis. Mild multifocal interphalangeal osteoarthrosis.  2.  No acute osseous abnormality.           2/1/2019 2:13 PM    HISTORY/REASON FOR EXAM:  Right hand pain.    TECHNIQUE/EXAM DESCRIPTION AND NUMBER OF VIEWS:  3 views of the RIGHT hand.    COMPARISON: 4/16/2017    FINDINGS:    BONE MINERALIZATION: Normal.  JOINTS: Mild multifocal osteoarthrosis. No erosions.  FRACTURE: None.  DISLOCATION: None.  SOFT TISSUES: No mass.   Impression       Mild multifocal osteoarthrosis. No acute osseous abnormality.     Thank you Elizabeth Babcock, A.P.R.N. for allowing me to participate in care for your patient.

## 2020-01-27 DIAGNOSIS — E03.9 HYPOTHYROIDISM, UNSPECIFIED TYPE: ICD-10-CM

## 2020-01-28 RX ORDER — LEVOTHYROXINE SODIUM 88 UG/1
TABLET ORAL
Qty: 90 TAB | Refills: 2 | Status: SHIPPED | OUTPATIENT
Start: 2020-01-28 | End: 2022-07-08

## 2020-01-28 NOTE — TELEPHONE ENCOUNTER
**[facility]**    Was the patient seen in the last year in this department? Yes    Does patient have an active prescription for medications requested? No     Received Request Via: Pharmacy    Pt met protocol?: Yes, last ov 9/19  Last labs 8/19    TSH   Date Value Ref Range Status   08/27/2019 0.690 0.380 - 5.330 uIU/mL Final     Comment:     Please note new reference ranges effective 12/14/2017 10:00 AM  Pregnant Females, 1st Trimester  0.050-3.700  Pregnant Females, 2nd Trimester  0.310-4.350  Pregnant Females, 3rd Trimester  0.410-5.180

## 2021-01-21 ENCOUNTER — HOSPITAL ENCOUNTER (OUTPATIENT)
Dept: HOSPITAL 8 - STAR | Age: 71
Discharge: HOME | End: 2021-01-21
Attending: ORTHOPAEDIC SURGERY
Payer: MEDICARE

## 2021-01-21 DIAGNOSIS — Z20.822: ICD-10-CM

## 2021-01-21 DIAGNOSIS — M18.12: ICD-10-CM

## 2021-01-21 DIAGNOSIS — Z01.818: Primary | ICD-10-CM

## 2021-01-21 LAB
ALBUMIN SERPL-MCNC: 4.2 G/DL (ref 3.4–5)
ALP SERPL-CCNC: 82 U/L (ref 45–117)
ALT SERPL-CCNC: 30 U/L (ref 12–78)
ANION GAP SERPL CALC-SCNC: 3 MMOL/L (ref 5–15)
BILIRUB SERPL-MCNC: 0.3 MG/DL (ref 0.2–1)
CALCIUM SERPL-MCNC: 9.5 MG/DL (ref 8.5–10.1)
CHLORIDE SERPL-SCNC: 102 MMOL/L (ref 98–107)
CREAT SERPL-MCNC: 0.94 MG/DL (ref 0.55–1.02)
PROT SERPL-MCNC: 7.3 G/DL (ref 6.4–8.2)

## 2021-01-21 PROCEDURE — 93005 ELECTROCARDIOGRAM TRACING: CPT

## 2021-01-21 PROCEDURE — 87635 SARS-COV-2 COVID-19 AMP PRB: CPT

## 2021-01-21 PROCEDURE — 80053 COMPREHEN METABOLIC PANEL: CPT

## 2021-01-27 ENCOUNTER — HOSPITAL ENCOUNTER (OUTPATIENT)
Dept: HOSPITAL 8 - OUT | Age: 71
Discharge: HOME | End: 2021-01-27
Attending: ORTHOPAEDIC SURGERY
Payer: MEDICARE

## 2021-01-27 VITALS — BODY MASS INDEX: 28.12 KG/M2 | HEIGHT: 64 IN | WEIGHT: 164.69 LBS

## 2021-01-27 VITALS — SYSTOLIC BLOOD PRESSURE: 158 MMHG | DIASTOLIC BLOOD PRESSURE: 82 MMHG

## 2021-01-27 DIAGNOSIS — E78.5: ICD-10-CM

## 2021-01-27 DIAGNOSIS — M25.742: ICD-10-CM

## 2021-01-27 DIAGNOSIS — Z82.61: ICD-10-CM

## 2021-01-27 DIAGNOSIS — Z79.890: ICD-10-CM

## 2021-01-27 DIAGNOSIS — I10: ICD-10-CM

## 2021-01-27 DIAGNOSIS — E07.9: ICD-10-CM

## 2021-01-27 DIAGNOSIS — Z79.899: ICD-10-CM

## 2021-01-27 DIAGNOSIS — Z88.1: ICD-10-CM

## 2021-01-27 DIAGNOSIS — M18.0: Primary | ICD-10-CM

## 2021-01-27 DIAGNOSIS — M79.7: ICD-10-CM

## 2021-01-27 DIAGNOSIS — Z79.891: ICD-10-CM

## 2021-01-27 DIAGNOSIS — Z91.040: ICD-10-CM

## 2021-01-27 PROCEDURE — C1762 CONN TISS, HUMAN(INC FASCIA): HCPCS

## 2021-01-27 PROCEDURE — 25447 ARTHRP NTRCRP/CRP/MTCR NTRPS: CPT

## 2021-01-27 RX ADMIN — FENTANYL CITRATE PRN MCG: 50 INJECTION INTRAMUSCULAR; INTRAVENOUS at 10:42

## 2021-01-27 RX ADMIN — FENTANYL CITRATE PRN MCG: 50 INJECTION INTRAMUSCULAR; INTRAVENOUS at 10:53

## 2021-01-27 RX ADMIN — FENTANYL CITRATE PRN MCG: 50 INJECTION INTRAMUSCULAR; INTRAVENOUS at 11:06

## 2021-05-12 ENCOUNTER — HOSPITAL ENCOUNTER (INPATIENT)
Dept: HOSPITAL 8 - ED | Age: 71
LOS: 5 days | Discharge: NURSING, REHAB OR LONG-TERM CARE | DRG: 64 | End: 2021-05-17
Attending: HOSPITALIST | Admitting: HOSPITALIST
Payer: MEDICARE

## 2021-05-12 VITALS — WEIGHT: 161.6 LBS | BODY MASS INDEX: 26.92 KG/M2 | HEIGHT: 65 IN

## 2021-05-12 VITALS — DIASTOLIC BLOOD PRESSURE: 56 MMHG | SYSTOLIC BLOOD PRESSURE: 131 MMHG

## 2021-05-12 DIAGNOSIS — Z20.822: ICD-10-CM

## 2021-05-12 DIAGNOSIS — I60.9: ICD-10-CM

## 2021-05-12 DIAGNOSIS — R27.0: ICD-10-CM

## 2021-05-12 DIAGNOSIS — G93.6: ICD-10-CM

## 2021-05-12 DIAGNOSIS — F80.2: ICD-10-CM

## 2021-05-12 DIAGNOSIS — Z91.040: ICD-10-CM

## 2021-05-12 DIAGNOSIS — I61.0: Primary | ICD-10-CM

## 2021-05-12 DIAGNOSIS — I10: ICD-10-CM

## 2021-05-12 DIAGNOSIS — E87.6: ICD-10-CM

## 2021-05-12 DIAGNOSIS — Z88.1: ICD-10-CM

## 2021-05-12 DIAGNOSIS — G62.9: ICD-10-CM

## 2021-05-12 DIAGNOSIS — M19.90: ICD-10-CM

## 2021-05-12 DIAGNOSIS — D64.9: ICD-10-CM

## 2021-05-12 DIAGNOSIS — E03.9: ICD-10-CM

## 2021-05-12 DIAGNOSIS — E78.5: ICD-10-CM

## 2021-05-12 DIAGNOSIS — G93.40: ICD-10-CM

## 2021-05-12 DIAGNOSIS — Z90.49: ICD-10-CM

## 2021-05-12 DIAGNOSIS — F32.9: ICD-10-CM

## 2021-05-12 DIAGNOSIS — Z87.820: ICD-10-CM

## 2021-05-12 DIAGNOSIS — E87.1: ICD-10-CM

## 2021-05-12 DIAGNOSIS — I62.00: ICD-10-CM

## 2021-05-12 LAB
ALBUMIN SERPL-MCNC: 3.9 G/DL (ref 3.4–5)
ALP SERPL-CCNC: 79 U/L (ref 45–117)
ALT SERPL-CCNC: 20 U/L (ref 12–78)
ANION GAP SERPL CALC-SCNC: 4 MMOL/L (ref 5–15)
ANION GAP SERPL CALC-SCNC: 6 MMOL/L (ref 5–15)
ANION GAP SERPL CALC-SCNC: 9 MMOL/L (ref 5–15)
APTT BLD: 26 SECONDS (ref 25–31)
BASOPHILS # BLD AUTO: 0 X10^3/UL (ref 0–0.1)
BASOPHILS NFR BLD AUTO: 0 % (ref 0–1)
BILIRUB SERPL-MCNC: 0.5 MG/DL (ref 0.2–1)
CALCIUM SERPL-MCNC: 8.8 MG/DL (ref 8.5–10.1)
CALCIUM SERPL-MCNC: 8.9 MG/DL (ref 8.5–10.1)
CALCIUM SERPL-MCNC: 9 MG/DL (ref 8.5–10.1)
CHLORIDE SERPL-SCNC: 100 MMOL/L (ref 98–107)
CHLORIDE SERPL-SCNC: 107 MMOL/L (ref 98–107)
CHLORIDE SERPL-SCNC: 107 MMOL/L (ref 98–107)
CREAT SERPL-MCNC: 0.7 MG/DL (ref 0.55–1.02)
CREAT SERPL-MCNC: 0.77 MG/DL (ref 0.55–1.02)
CREAT SERPL-MCNC: 0.98 MG/DL (ref 0.55–1.02)
EOSINOPHIL # BLD AUTO: 0 X10^3/UL (ref 0–0.4)
EOSINOPHIL NFR BLD AUTO: 0 % (ref 1–7)
ERYTHROCYTE [DISTWIDTH] IN BLOOD BY AUTOMATED COUNT: 12.9 % (ref 9.6–15.2)
INR PPP: 1.06 (ref 0.93–1.1)
LYMPHOCYTES # BLD AUTO: 0.6 X10^3/UL (ref 1–3.4)
LYMPHOCYTES NFR BLD AUTO: 7 % (ref 22–44)
MCH RBC QN AUTO: 31.6 PG (ref 27–34.8)
MCHC RBC AUTO-ENTMCNC: 34.3 G/DL (ref 32.4–35.8)
MD: NO
MONOCYTES # BLD AUTO: 0.4 X10^3/UL (ref 0.2–0.8)
MONOCYTES NFR BLD AUTO: 4 % (ref 2–9)
NEUTROPHILS # BLD AUTO: 8.2 X10^3/UL (ref 1.8–6.8)
NEUTROPHILS NFR BLD AUTO: 89 % (ref 42–75)
PLATELET # BLD AUTO: 262 X10^3/UL (ref 130–400)
PMV BLD AUTO: 7.2 FL (ref 7.4–10.4)
PROT SERPL-MCNC: 7.1 G/DL (ref 6.4–8.2)
PROTHROMBIN TIME: 11.3 SECONDS (ref 9.6–11.5)
RBC # BLD AUTO: 4.02 X10^6/UL (ref 3.82–5.3)
VANCOMYCIN TROUGH SERPL-MCNC: < 1.7 MG/DL (ref 2.8–20)

## 2021-05-12 PROCEDURE — 87081 CULTURE SCREEN ONLY: CPT

## 2021-05-12 PROCEDURE — 80307 DRUG TEST PRSMV CHEM ANLYZR: CPT

## 2021-05-12 PROCEDURE — 80061 LIPID PANEL: CPT

## 2021-05-12 PROCEDURE — 84100 ASSAY OF PHOSPHORUS: CPT

## 2021-05-12 PROCEDURE — 85730 THROMBOPLASTIN TIME PARTIAL: CPT

## 2021-05-12 PROCEDURE — 80048 BASIC METABOLIC PNL TOTAL CA: CPT

## 2021-05-12 PROCEDURE — 80053 COMPREHEN METABOLIC PANEL: CPT

## 2021-05-12 PROCEDURE — 83735 ASSAY OF MAGNESIUM: CPT

## 2021-05-12 PROCEDURE — 82140 ASSAY OF AMMONIA: CPT

## 2021-05-12 PROCEDURE — 85610 PROTHROMBIN TIME: CPT

## 2021-05-12 PROCEDURE — 36415 COLL VENOUS BLD VENIPUNCTURE: CPT

## 2021-05-12 PROCEDURE — 82962 GLUCOSE BLOOD TEST: CPT

## 2021-05-12 PROCEDURE — 96375 TX/PRO/DX INJ NEW DRUG ADDON: CPT

## 2021-05-12 PROCEDURE — 70551 MRI BRAIN STEM W/O DYE: CPT

## 2021-05-12 PROCEDURE — 96374 THER/PROPH/DIAG INJ IV PUSH: CPT

## 2021-05-12 PROCEDURE — 80299 QUANTITATIVE ASSAY DRUG: CPT

## 2021-05-12 PROCEDURE — 99291 CRITICAL CARE FIRST HOUR: CPT

## 2021-05-12 PROCEDURE — 87635 SARS-COV-2 COVID-19 AMP PRB: CPT

## 2021-05-12 PROCEDURE — 93005 ELECTROCARDIOGRAM TRACING: CPT

## 2021-05-12 PROCEDURE — 70496 CT ANGIOGRAPHY HEAD: CPT

## 2021-05-12 PROCEDURE — 70450 CT HEAD/BRAIN W/O DYE: CPT

## 2021-05-12 PROCEDURE — 80329 ANALGESICS NON-OPIOID 1 OR 2: CPT

## 2021-05-12 PROCEDURE — 85025 COMPLETE CBC W/AUTO DIFF WBC: CPT

## 2021-05-12 PROCEDURE — G0480 DRUG TEST DEF 1-7 CLASSES: HCPCS

## 2021-05-12 PROCEDURE — 80320 DRUG SCREEN QUANTALCOHOLS: CPT

## 2021-05-12 RX ADMIN — MORPHINE SULFATE PRN MG: 4 INJECTION INTRAVENOUS at 15:22

## 2021-05-12 RX ADMIN — ENALAPRILAT PRN MG: 1.25 INJECTION, SOLUTION INTRAVENOUS at 13:15

## 2021-05-12 RX ADMIN — LEVETIRACETAM SCH MLS/HR: 100 INJECTION, SOLUTION, CONCENTRATE INTRAVENOUS at 13:09

## 2021-05-12 RX ADMIN — MORPHINE SULFATE PRN MG: 4 INJECTION INTRAVENOUS at 20:24

## 2021-05-12 NOTE — NUR
INITIAL PT CONTACT. PT PRESENTS TO ED VIA EMS C/O POSSIBLE OD/ALTERED MENTAL 
STATUS. PER EMS PT HAS "NOT BEEN FEELING GREAT FOR THE DAY AND HAS BEEN SELF 
MEDICATING WITH OXY AND TRAZADONE. UNKNOW HOW MUCH SHE HAS TAKEN THROUGH THE 
DAY." PER  PT WAS C/O NAUSEA AND VOMITING EARLY IN THE MORNING, "SHE 
THOUGHT IT WAS FOOD POISONING. SHE JUST SEEMED VERY TIRED AND LETHARGIC ALL 
DAY. THEN TONIGHT I NOTICED SHE WAS IN THE BATHROOM FOR A LONG TIME AND WENT TO 
SEE IF SHE WAS OK AND I FOUND HER CONFUSED LIKE SHE IS NOW. I TRIED TO STAND 
HER UP AND THEN SHE FELL IN BETWEEN THE CABINET AND TOILET, THATS WHEN I CALLED 
THE PARAMEDICS." PT A&OX1-2 UPON ARRIVAL, VERY DROWSY IN ROOM, DOZING OFF 
DURING EXAM. OPENS EYES AND RESPONDS TO VERBAL COMMANDS. PT PLACED IN GOWN, 
CONTINUOUS MONITORING. CALL LIGHT IN REACH.  AT BEDSIDE. AWAITING ERP.

## 2021-05-12 NOTE — NUR
PT SITTING UPRIGHT ON GURNEY, CONVERSING WITH THIS RN AND . PT NOW 
A&OX3-4. PT ABLE TO EXPRESS PAIN AND NEED TO USE BATHROOM. PURE WICK PLACED AND 
PT URINATED. URINE SAMPLE COLLECTED AND WALKED TO LAB BY THIS RN. PT DENIES ANY 
ADDITIONAL NEEDS AT THIS TIME. CALL LIGHT AND BELONGINGS WITHIN REACH.  
REMAINS AT BEDSIDE.

## 2021-05-13 VITALS — SYSTOLIC BLOOD PRESSURE: 163 MMHG | DIASTOLIC BLOOD PRESSURE: 84 MMHG

## 2021-05-13 VITALS — DIASTOLIC BLOOD PRESSURE: 79 MMHG | SYSTOLIC BLOOD PRESSURE: 157 MMHG

## 2021-05-13 LAB
ANION GAP SERPL CALC-SCNC: 5 MMOL/L (ref 5–15)
BASOPHILS # BLD AUTO: 0 X10^3/UL (ref 0–0.1)
BASOPHILS NFR BLD AUTO: 0 % (ref 0–1)
CALCIUM SERPL-MCNC: 8.3 MG/DL (ref 8.5–10.1)
CHLORIDE SERPL-SCNC: 107 MMOL/L (ref 98–107)
CHOL/HDL RATIO: 3.3
CREAT SERPL-MCNC: 0.67 MG/DL (ref 0.55–1.02)
EOSINOPHIL # BLD AUTO: 0 X10^3/UL (ref 0–0.4)
EOSINOPHIL NFR BLD AUTO: 0 % (ref 1–7)
ERYTHROCYTE [DISTWIDTH] IN BLOOD BY AUTOMATED COUNT: 13.2 % (ref 9.6–15.2)
HDL CHOL %: 31 % (ref 28–40)
HDL CHOLESTEROL (DIRECT): 60 MG/DL (ref 40–60)
LDL CHOLESTEROL,CALCULATED: 117 MG/DL (ref 54–169)
LDLC/HDLC SERPL: 2 {RATIO} (ref 0.5–3)
LYMPHOCYTES # BLD AUTO: 1.4 X10^3/UL (ref 1–3.4)
LYMPHOCYTES NFR BLD AUTO: 20 % (ref 22–44)
MCH RBC QN AUTO: 32.1 PG (ref 27–34.8)
MCHC RBC AUTO-ENTMCNC: 34.4 G/DL (ref 32.4–35.8)
MD: NO
MONOCYTES # BLD AUTO: 0.7 X10^3/UL (ref 0.2–0.8)
MONOCYTES NFR BLD AUTO: 11 % (ref 2–9)
NEUTROPHILS # BLD AUTO: 4.9 X10^3/UL (ref 1.8–6.8)
NEUTROPHILS NFR BLD AUTO: 69 % (ref 42–75)
PLATELET # BLD AUTO: 232 X10^3/UL (ref 130–400)
PMV BLD AUTO: 7.1 FL (ref 7.4–10.4)
RBC # BLD AUTO: 3.6 X10^6/UL (ref 3.82–5.3)
TRIGL SERPL-MCNC: 88 MG/DL (ref 50–200)
VLDLC SERPL CALC-MCNC: 18 MG/DL (ref 0–25)

## 2021-05-13 RX ADMIN — ACETAMINOPHEN PRN MG: 325 TABLET, FILM COATED ORAL at 20:29

## 2021-05-13 RX ADMIN — GABAPENTIN SCH MG: 400 CAPSULE ORAL at 15:32

## 2021-05-13 RX ADMIN — LEVOTHYROXINE SODIUM SCH MCG: 88 TABLET ORAL at 09:42

## 2021-05-13 RX ADMIN — CITALOPRAM HYDROBROMIDE SCH MG: 20 TABLET ORAL at 09:42

## 2021-05-13 RX ADMIN — GABAPENTIN SCH MG: 400 CAPSULE ORAL at 20:15

## 2021-05-13 RX ADMIN — ACETAMINOPHEN PRN MG: 325 TABLET, FILM COATED ORAL at 15:32

## 2021-05-13 RX ADMIN — MORPHINE SULFATE PRN MG: 4 INJECTION INTRAVENOUS at 11:09

## 2021-05-13 RX ADMIN — ATORVASTATIN CALCIUM SCH MG: 40 TABLET, FILM COATED ORAL at 20:15

## 2021-05-13 RX ADMIN — ENALAPRILAT PRN MG: 1.25 INJECTION, SOLUTION INTRAVENOUS at 17:15

## 2021-05-13 RX ADMIN — ACETAMINOPHEN PRN MG: 325 TABLET, FILM COATED ORAL at 09:02

## 2021-05-13 RX ADMIN — LEVETIRACETAM SCH MLS/HR: 100 INJECTION, SOLUTION, CONCENTRATE INTRAVENOUS at 00:13

## 2021-05-13 RX ADMIN — MORPHINE SULFATE PRN MG: 4 INJECTION INTRAVENOUS at 00:14

## 2021-05-13 RX ADMIN — LEVETIRACETAM SCH MLS/HR: 100 INJECTION, SOLUTION, CONCENTRATE INTRAVENOUS at 13:35

## 2021-05-14 VITALS — DIASTOLIC BLOOD PRESSURE: 68 MMHG | SYSTOLIC BLOOD PRESSURE: 154 MMHG

## 2021-05-14 VITALS — SYSTOLIC BLOOD PRESSURE: 149 MMHG | DIASTOLIC BLOOD PRESSURE: 69 MMHG

## 2021-05-14 VITALS — SYSTOLIC BLOOD PRESSURE: 118 MMHG | DIASTOLIC BLOOD PRESSURE: 69 MMHG

## 2021-05-14 VITALS — DIASTOLIC BLOOD PRESSURE: 80 MMHG | SYSTOLIC BLOOD PRESSURE: 184 MMHG

## 2021-05-14 VITALS — DIASTOLIC BLOOD PRESSURE: 81 MMHG | SYSTOLIC BLOOD PRESSURE: 162 MMHG

## 2021-05-14 RX ADMIN — LEVOTHYROXINE SODIUM SCH MCG: 88 TABLET ORAL at 06:48

## 2021-05-14 RX ADMIN — ACETAMINOPHEN PRN MG: 325 TABLET, FILM COATED ORAL at 09:52

## 2021-05-14 RX ADMIN — ENALAPRILAT PRN MG: 1.25 INJECTION, SOLUTION INTRAVENOUS at 22:00

## 2021-05-14 RX ADMIN — CITALOPRAM HYDROBROMIDE SCH MG: 20 TABLET ORAL at 08:47

## 2021-05-14 RX ADMIN — LEVETIRACETAM SCH MLS/HR: 100 INJECTION, SOLUTION, CONCENTRATE INTRAVENOUS at 01:33

## 2021-05-14 RX ADMIN — GABAPENTIN SCH MG: 400 CAPSULE ORAL at 08:47

## 2021-05-14 RX ADMIN — LEVETIRACETAM SCH MLS/HR: 100 INJECTION, SOLUTION, CONCENTRATE INTRAVENOUS at 13:10

## 2021-05-14 RX ADMIN — GABAPENTIN SCH MG: 400 CAPSULE ORAL at 20:38

## 2021-05-14 RX ADMIN — ACETAMINOPHEN PRN MG: 325 TABLET, FILM COATED ORAL at 23:32

## 2021-05-14 RX ADMIN — GABAPENTIN SCH MG: 400 CAPSULE ORAL at 16:20

## 2021-05-14 RX ADMIN — ATORVASTATIN CALCIUM SCH MG: 40 TABLET, FILM COATED ORAL at 20:38

## 2021-05-14 RX ADMIN — OMEPRAZOLE SCH MG: 20 CAPSULE, DELAYED RELEASE ORAL at 06:48

## 2021-05-14 RX ADMIN — ACETAMINOPHEN PRN MG: 325 TABLET, FILM COATED ORAL at 14:39

## 2021-05-15 VITALS — DIASTOLIC BLOOD PRESSURE: 72 MMHG | SYSTOLIC BLOOD PRESSURE: 133 MMHG

## 2021-05-15 VITALS — DIASTOLIC BLOOD PRESSURE: 73 MMHG | SYSTOLIC BLOOD PRESSURE: 143 MMHG

## 2021-05-15 VITALS — SYSTOLIC BLOOD PRESSURE: 145 MMHG | DIASTOLIC BLOOD PRESSURE: 81 MMHG

## 2021-05-15 VITALS — DIASTOLIC BLOOD PRESSURE: 80 MMHG | SYSTOLIC BLOOD PRESSURE: 181 MMHG

## 2021-05-15 VITALS — SYSTOLIC BLOOD PRESSURE: 151 MMHG | DIASTOLIC BLOOD PRESSURE: 75 MMHG

## 2021-05-15 VITALS — SYSTOLIC BLOOD PRESSURE: 157 MMHG | DIASTOLIC BLOOD PRESSURE: 77 MMHG

## 2021-05-15 RX ADMIN — CITALOPRAM HYDROBROMIDE SCH MG: 20 TABLET ORAL at 09:12

## 2021-05-15 RX ADMIN — LEVOTHYROXINE SODIUM SCH MCG: 88 TABLET ORAL at 05:30

## 2021-05-15 RX ADMIN — GABAPENTIN SCH MG: 400 CAPSULE ORAL at 16:05

## 2021-05-15 RX ADMIN — GABAPENTIN SCH MG: 400 CAPSULE ORAL at 09:12

## 2021-05-15 RX ADMIN — LEVETIRACETAM SCH MLS/HR: 100 INJECTION, SOLUTION, CONCENTRATE INTRAVENOUS at 01:35

## 2021-05-15 RX ADMIN — ACETAMINOPHEN PRN MG: 325 TABLET, FILM COATED ORAL at 13:08

## 2021-05-15 RX ADMIN — TRAZODONE HYDROCHLORIDE SCH MG: 100 TABLET, FILM COATED ORAL at 20:48

## 2021-05-15 RX ADMIN — ATORVASTATIN CALCIUM SCH MG: 40 TABLET, FILM COATED ORAL at 20:48

## 2021-05-15 RX ADMIN — ENALAPRILAT PRN MG: 1.25 INJECTION, SOLUTION INTRAVENOUS at 13:09

## 2021-05-15 RX ADMIN — ACETAMINOPHEN PRN MG: 325 TABLET, FILM COATED ORAL at 20:48

## 2021-05-15 RX ADMIN — OMEPRAZOLE SCH MG: 20 CAPSULE, DELAYED RELEASE ORAL at 05:30

## 2021-05-15 RX ADMIN — GABAPENTIN SCH MG: 400 CAPSULE ORAL at 20:48

## 2021-05-15 RX ADMIN — AMLODIPINE BESYLATE SCH MG: 5 TABLET ORAL at 14:23

## 2021-05-15 RX ADMIN — LEVETIRACETAM SCH MLS/HR: 100 INJECTION, SOLUTION, CONCENTRATE INTRAVENOUS at 13:09

## 2021-05-15 RX ADMIN — LISINOPRIL SCH MG: 20 TABLET ORAL at 09:12

## 2021-05-15 RX ADMIN — LISINOPRIL SCH MG: 20 TABLET ORAL at 20:48

## 2021-05-16 VITALS — SYSTOLIC BLOOD PRESSURE: 125 MMHG | DIASTOLIC BLOOD PRESSURE: 76 MMHG

## 2021-05-16 VITALS — DIASTOLIC BLOOD PRESSURE: 67 MMHG | SYSTOLIC BLOOD PRESSURE: 110 MMHG

## 2021-05-16 VITALS — SYSTOLIC BLOOD PRESSURE: 150 MMHG | DIASTOLIC BLOOD PRESSURE: 80 MMHG

## 2021-05-16 VITALS — DIASTOLIC BLOOD PRESSURE: 81 MMHG | SYSTOLIC BLOOD PRESSURE: 138 MMHG

## 2021-05-16 VITALS — SYSTOLIC BLOOD PRESSURE: 114 MMHG | DIASTOLIC BLOOD PRESSURE: 67 MMHG

## 2021-05-16 LAB
ANION GAP SERPL CALC-SCNC: 6 MMOL/L (ref 5–15)
BASOPHILS # BLD AUTO: 0 X10^3/UL (ref 0–0.1)
BASOPHILS NFR BLD AUTO: 0 % (ref 0–1)
CALCIUM SERPL-MCNC: 8.6 MG/DL (ref 8.5–10.1)
CHLORIDE SERPL-SCNC: 100 MMOL/L (ref 98–107)
CREAT SERPL-MCNC: 0.75 MG/DL (ref 0.55–1.02)
EOSINOPHIL # BLD AUTO: 0.1 X10^3/UL (ref 0–0.4)
EOSINOPHIL NFR BLD AUTO: 1 % (ref 1–7)
ERYTHROCYTE [DISTWIDTH] IN BLOOD BY AUTOMATED COUNT: 12.8 % (ref 9.6–15.2)
LYMPHOCYTES # BLD AUTO: 2.4 X10^3/UL (ref 1–3.4)
LYMPHOCYTES NFR BLD AUTO: 31 % (ref 22–44)
MCH RBC QN AUTO: 31.7 PG (ref 27–34.8)
MCHC RBC AUTO-ENTMCNC: 34.9 G/DL (ref 32.4–35.8)
MD: NO
MONOCYTES # BLD AUTO: 0.8 X10^3/UL (ref 0.2–0.8)
MONOCYTES NFR BLD AUTO: 10 % (ref 2–9)
NEUTROPHILS # BLD AUTO: 4.6 X10^3/UL (ref 1.8–6.8)
NEUTROPHILS NFR BLD AUTO: 58 % (ref 42–75)
PLATELET # BLD AUTO: 308 X10^3/UL (ref 130–400)
PMV BLD AUTO: 7.6 FL (ref 7.4–10.4)
RBC # BLD AUTO: 4.16 X10^6/UL (ref 3.82–5.3)

## 2021-05-16 RX ADMIN — TRAZODONE HYDROCHLORIDE SCH MG: 100 TABLET, FILM COATED ORAL at 21:18

## 2021-05-16 RX ADMIN — LEVETIRACETAM SCH MG: 500 TABLET ORAL at 08:40

## 2021-05-16 RX ADMIN — GABAPENTIN SCH MG: 400 CAPSULE ORAL at 08:40

## 2021-05-16 RX ADMIN — AMLODIPINE BESYLATE SCH MG: 5 TABLET ORAL at 08:39

## 2021-05-16 RX ADMIN — GABAPENTIN SCH MG: 400 CAPSULE ORAL at 15:28

## 2021-05-16 RX ADMIN — GABAPENTIN SCH MG: 400 CAPSULE ORAL at 21:18

## 2021-05-16 RX ADMIN — POTASSIUM CHLORIDE SCH MEQ: 20 TABLET, EXTENDED RELEASE ORAL at 08:39

## 2021-05-16 RX ADMIN — LEVETIRACETAM SCH MG: 500 TABLET ORAL at 21:18

## 2021-05-16 RX ADMIN — LEVOTHYROXINE SODIUM SCH MCG: 88 TABLET ORAL at 05:52

## 2021-05-16 RX ADMIN — LISINOPRIL SCH MG: 20 TABLET ORAL at 08:39

## 2021-05-16 RX ADMIN — ATORVASTATIN CALCIUM SCH MG: 40 TABLET, FILM COATED ORAL at 21:18

## 2021-05-16 RX ADMIN — LISINOPRIL SCH MG: 20 TABLET ORAL at 21:18

## 2021-05-16 RX ADMIN — POTASSIUM CHLORIDE SCH MEQ: 20 TABLET, EXTENDED RELEASE ORAL at 15:28

## 2021-05-16 RX ADMIN — CITALOPRAM HYDROBROMIDE SCH MG: 20 TABLET ORAL at 08:40

## 2021-05-16 RX ADMIN — LEVETIRACETAM SCH MLS/HR: 100 INJECTION, SOLUTION, CONCENTRATE INTRAVENOUS at 00:30

## 2021-05-16 RX ADMIN — OMEPRAZOLE SCH MG: 20 CAPSULE, DELAYED RELEASE ORAL at 05:52

## 2021-05-16 RX ADMIN — ACETAMINOPHEN PRN MG: 325 TABLET, FILM COATED ORAL at 21:18

## 2021-05-16 RX ADMIN — ACETAMINOPHEN PRN MG: 325 TABLET, FILM COATED ORAL at 08:21

## 2021-05-16 RX ADMIN — ACETAMINOPHEN PRN MG: 325 TABLET, FILM COATED ORAL at 15:28

## 2021-05-16 RX ADMIN — ACETAMINOPHEN PRN MG: 325 TABLET, FILM COATED ORAL at 00:31

## 2021-05-17 VITALS — SYSTOLIC BLOOD PRESSURE: 135 MMHG | DIASTOLIC BLOOD PRESSURE: 76 MMHG

## 2021-05-17 VITALS — SYSTOLIC BLOOD PRESSURE: 129 MMHG | DIASTOLIC BLOOD PRESSURE: 76 MMHG

## 2021-05-17 VITALS — DIASTOLIC BLOOD PRESSURE: 77 MMHG | SYSTOLIC BLOOD PRESSURE: 149 MMHG

## 2021-05-17 LAB
ANION GAP SERPL CALC-SCNC: 4 MMOL/L (ref 5–15)
CALCIUM SERPL-MCNC: 8.7 MG/DL (ref 8.5–10.1)
CHLORIDE SERPL-SCNC: 105 MMOL/L (ref 98–107)
CREAT SERPL-MCNC: 0.74 MG/DL (ref 0.55–1.02)

## 2021-05-17 RX ADMIN — ACETAMINOPHEN PRN MG: 325 TABLET, FILM COATED ORAL at 08:14

## 2021-05-17 RX ADMIN — LEVETIRACETAM SCH MG: 500 TABLET ORAL at 08:14

## 2021-05-17 RX ADMIN — OMEPRAZOLE SCH MG: 20 CAPSULE, DELAYED RELEASE ORAL at 05:11

## 2021-05-17 RX ADMIN — GABAPENTIN SCH MG: 400 CAPSULE ORAL at 15:02

## 2021-05-17 RX ADMIN — LEVOTHYROXINE SODIUM SCH MCG: 88 TABLET ORAL at 05:11

## 2021-05-17 RX ADMIN — ACETAMINOPHEN PRN MG: 325 TABLET, FILM COATED ORAL at 15:02

## 2021-05-17 RX ADMIN — CITALOPRAM HYDROBROMIDE SCH MG: 20 TABLET ORAL at 08:14

## 2021-05-17 RX ADMIN — GABAPENTIN SCH MG: 400 CAPSULE ORAL at 08:14

## 2021-05-17 RX ADMIN — LISINOPRIL SCH MG: 20 TABLET ORAL at 08:15

## 2021-05-17 RX ADMIN — AMLODIPINE BESYLATE SCH MG: 5 TABLET ORAL at 08:14

## 2021-06-16 ENCOUNTER — HOSPITAL ENCOUNTER (OUTPATIENT)
Dept: HOSPITAL 8 - CFH | Age: 71
Discharge: HOME | End: 2021-06-16
Attending: NEUROLOGICAL SURGERY
Payer: MEDICARE
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
## 2021-06-16 DIAGNOSIS — S06.5X0A: Primary | ICD-10-CM
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
## 2021-06-16 PROCEDURE — A9575 INJ GADOTERATE MEGLUMI 0.1ML: HCPCS

## 2021-06-16 PROCEDURE — 70553 MRI BRAIN STEM W/O & W/DYE: CPT

## 2022-03-11 PROBLEM — M18.11 OSTEOARTHRITIS OF RIGHT THUMB: Status: ACTIVE | Noted: 2022-03-11

## 2022-05-17 ENCOUNTER — HOSPITAL ENCOUNTER (OUTPATIENT)
Dept: RADIOLOGY | Facility: MEDICAL CENTER | Age: 72
End: 2022-05-17
Payer: MEDICARE

## 2022-07-08 ENCOUNTER — PRE-ADMISSION TESTING (OUTPATIENT)
Dept: ADMISSIONS | Facility: MEDICAL CENTER | Age: 72
End: 2022-07-08
Attending: PSYCHIATRY & NEUROLOGY
Payer: MEDICARE

## 2022-07-08 RX ORDER — LEVOTHYROXINE SODIUM 50 UG/1
50 TABLET ORAL
COMMUNITY
Start: 2022-05-13 | End: 2022-08-24

## 2022-07-08 RX ORDER — TRAZODONE HYDROCHLORIDE 150 MG/1
225 TABLET ORAL NIGHTLY
COMMUNITY

## 2022-07-08 RX ORDER — LEVETIRACETAM 250 MG/1
250 TABLET ORAL 2 TIMES DAILY
COMMUNITY
Start: 2022-05-10

## 2022-07-08 RX ORDER — GABAPENTIN 300 MG/1
300-600 CAPSULE ORAL 4 TIMES DAILY
COMMUNITY

## 2022-07-08 RX ORDER — LISINOPRIL 20 MG/1
20 TABLET ORAL EVERY MORNING
COMMUNITY
End: 2022-08-24

## 2022-07-08 RX ORDER — CITALOPRAM 40 MG/1
40 TABLET ORAL EVERY MORNING
COMMUNITY

## 2022-07-08 RX ORDER — HYDROCODONE BITARTRATE AND ACETAMINOPHEN 10; 325 MG/1; MG/1
1 TABLET ORAL EVERY 8 HOURS PRN
COMMUNITY

## 2022-07-08 RX ORDER — BUSPIRONE HYDROCHLORIDE 15 MG/1
15 TABLET ORAL 3 TIMES DAILY
COMMUNITY

## 2022-07-08 RX ORDER — ACETAMINOPHEN 500 MG
1000 TABLET ORAL 2 TIMES DAILY PRN
COMMUNITY

## 2022-07-11 ENCOUNTER — HOSPITAL ENCOUNTER (OUTPATIENT)
Facility: MEDICAL CENTER | Age: 72
End: 2022-07-11
Attending: PSYCHIATRY & NEUROLOGY | Admitting: PSYCHIATRY & NEUROLOGY
Payer: MEDICARE

## 2022-07-11 ENCOUNTER — APPOINTMENT (OUTPATIENT)
Dept: RADIOLOGY | Facility: MEDICAL CENTER | Age: 72
End: 2022-07-11
Attending: PSYCHIATRY & NEUROLOGY
Payer: MEDICARE

## 2022-07-11 VITALS
OXYGEN SATURATION: 92 % | RESPIRATION RATE: 16 BRPM | BODY MASS INDEX: 27.44 KG/M2 | DIASTOLIC BLOOD PRESSURE: 81 MMHG | HEIGHT: 65 IN | SYSTOLIC BLOOD PRESSURE: 147 MMHG | HEART RATE: 75 BPM | TEMPERATURE: 97.5 F | WEIGHT: 164.68 LBS

## 2022-07-11 DIAGNOSIS — I62.9 NON-TRAUMATIC INTRACRANIAL HEMORRHAGE (HCC): ICD-10-CM

## 2022-07-11 DIAGNOSIS — G81.94 HEMIPLEGIA AFFECTING LEFT NONDOMINANT SIDE, UNSPECIFIED ETIOLOGY, UNSPECIFIED HEMIPLEGIA TYPE (HCC): ICD-10-CM

## 2022-07-11 DIAGNOSIS — Q28.2 CEREBRAL ARTERIOVENOUS MALFORMATION: ICD-10-CM

## 2022-07-11 DIAGNOSIS — I77.0 AVF (ARTERIOVENOUS FISTULA) (HCC): ICD-10-CM

## 2022-07-11 DIAGNOSIS — G44.1 VASCULAR HEADACHE, NOT ELSEWHERE CLASSIFIED: ICD-10-CM

## 2022-07-11 LAB
CREAT SERPL-MCNC: 0.8 MG/DL (ref 0.5–1.4)
ERYTHROCYTE [DISTWIDTH] IN BLOOD BY AUTOMATED COUNT: 41.1 FL (ref 35.9–50)
GFR SERPLBLD CREATININE-BSD FMLA CKD-EPI: 79 ML/MIN/1.73 M 2
HCT VFR BLD AUTO: 44.2 % (ref 37–47)
HGB BLD-MCNC: 14.7 G/DL (ref 12–16)
INR PPP: 0.89 (ref 0.87–1.13)
MCH RBC QN AUTO: 32.7 PG (ref 27–33)
MCHC RBC AUTO-ENTMCNC: 33.3 G/DL (ref 33.6–35)
MCV RBC AUTO: 98.2 FL (ref 81.4–97.8)
PLATELET # BLD AUTO: 258 K/UL (ref 164–446)
PMV BLD AUTO: 9.1 FL (ref 9–12.9)
PROTHROMBIN TIME: 12 SEC (ref 12–14.6)
RBC # BLD AUTO: 4.5 M/UL (ref 4.2–5.4)
WBC # BLD AUTO: 5.5 K/UL (ref 4.8–10.8)

## 2022-07-11 PROCEDURE — 160046 HCHG PACU - 1ST 60 MINS PHASE II

## 2022-07-11 PROCEDURE — 700102 HCHG RX REV CODE 250 W/ 637 OVERRIDE(OP): Performed by: RADIOLOGY

## 2022-07-11 PROCEDURE — 160036 HCHG PACU - EA ADDL 30 MINS PHASE I

## 2022-07-11 PROCEDURE — 85027 COMPLETE CBC AUTOMATED: CPT

## 2022-07-11 PROCEDURE — 700111 HCHG RX REV CODE 636 W/ 250 OVERRIDE (IP)

## 2022-07-11 PROCEDURE — 160002 HCHG RECOVERY MINUTES (STAT)

## 2022-07-11 PROCEDURE — A9270 NON-COVERED ITEM OR SERVICE: HCPCS | Performed by: RADIOLOGY

## 2022-07-11 PROCEDURE — 99153 MOD SED SAME PHYS/QHP EA: CPT

## 2022-07-11 PROCEDURE — 82565 ASSAY OF CREATININE: CPT

## 2022-07-11 PROCEDURE — 160035 HCHG PACU - 1ST 60 MINS PHASE I

## 2022-07-11 PROCEDURE — 700111 HCHG RX REV CODE 636 W/ 250 OVERRIDE (IP): Performed by: RADIOLOGY

## 2022-07-11 PROCEDURE — 700117 HCHG RX CONTRAST REV CODE 255: Performed by: RADIOLOGY

## 2022-07-11 PROCEDURE — 85610 PROTHROMBIN TIME: CPT

## 2022-07-11 RX ORDER — HYDROMORPHONE HYDROCHLORIDE 1 MG/ML
0.25 INJECTION, SOLUTION INTRAMUSCULAR; INTRAVENOUS; SUBCUTANEOUS
Status: DISCONTINUED | OUTPATIENT
Start: 2022-07-11 | End: 2022-07-11 | Stop reason: HOSPADM

## 2022-07-11 RX ORDER — OXYCODONE HYDROCHLORIDE 5 MG/1
5 TABLET ORAL
Status: DISCONTINUED | OUTPATIENT
Start: 2022-07-11 | End: 2022-07-11 | Stop reason: HOSPADM

## 2022-07-11 RX ORDER — OXYCODONE HYDROCHLORIDE 5 MG/1
2.5 TABLET ORAL
Status: DISCONTINUED | OUTPATIENT
Start: 2022-07-11 | End: 2022-07-11 | Stop reason: HOSPADM

## 2022-07-11 RX ORDER — MIDAZOLAM HYDROCHLORIDE 1 MG/ML
.5-2 INJECTION INTRAMUSCULAR; INTRAVENOUS PRN
Status: DISCONTINUED | OUTPATIENT
Start: 2022-07-11 | End: 2022-07-11

## 2022-07-11 RX ORDER — ONDANSETRON 2 MG/ML
4 INJECTION INTRAMUSCULAR; INTRAVENOUS PRN
Status: DISCONTINUED | OUTPATIENT
Start: 2022-07-11 | End: 2022-07-11

## 2022-07-11 RX ORDER — SODIUM CHLORIDE 9 MG/ML
500 INJECTION, SOLUTION INTRAVENOUS
Status: ACTIVE | OUTPATIENT
Start: 2022-07-11 | End: 2022-07-11

## 2022-07-11 RX ORDER — MIDAZOLAM HYDROCHLORIDE 1 MG/ML
INJECTION INTRAMUSCULAR; INTRAVENOUS
Status: COMPLETED
Start: 2022-07-11 | End: 2022-07-11

## 2022-07-11 RX ORDER — SODIUM CHLORIDE 9 MG/ML
1000 INJECTION, SOLUTION INTRAVENOUS
Status: DISCONTINUED | OUTPATIENT
Start: 2022-07-11 | End: 2022-07-11 | Stop reason: HOSPADM

## 2022-07-11 RX ORDER — DEXTROAMPHETAMINE SACCHARATE, AMPHETAMINE ASPARTATE, DEXTROAMPHETAMINE SULFATE AND AMPHETAMINE SULFATE 3.75; 3.75; 3.75; 3.75 MG/1; MG/1; MG/1; MG/1
15 TABLET ORAL 2 TIMES DAILY
COMMUNITY

## 2022-07-11 RX ADMIN — MIDAZOLAM HYDROCHLORIDE 0.5 MG: 1 INJECTION, SOLUTION INTRAMUSCULAR; INTRAVENOUS at 11:46

## 2022-07-11 RX ADMIN — MIDAZOLAM HYDROCHLORIDE 0.5 MG: 1 INJECTION, SOLUTION INTRAMUSCULAR; INTRAVENOUS at 11:52

## 2022-07-11 RX ADMIN — FENTANYL CITRATE 25 MCG: 50 INJECTION, SOLUTION INTRAMUSCULAR; INTRAVENOUS at 11:46

## 2022-07-11 RX ADMIN — IOHEXOL 40 ML: 300 INJECTION, SOLUTION INTRAVENOUS at 13:00

## 2022-07-11 RX ADMIN — OXYCODONE 5 MG: 5 TABLET ORAL at 13:16

## 2022-07-11 RX ADMIN — FENTANYL CITRATE 25 MCG: 50 INJECTION, SOLUTION INTRAMUSCULAR; INTRAVENOUS at 11:52

## 2022-07-11 ASSESSMENT — PAIN DESCRIPTION - PAIN TYPE
TYPE: CHRONIC PAIN
TYPE: ACUTE PAIN

## 2022-07-11 NOTE — PROGRESS NOTES
Med rec updated and complete, per pt had a list of medications that she read to this writer   Allergies reviewed, per pt   Interviewed pt with  at bedside with permission from pt.

## 2022-07-11 NOTE — DISCHARGE INSTRUCTIONS
"If any questions arise, call your provider.  If your provider is not available, please feel free to call the Surgical [facility] at [phone].    MEDICATIONS: Resume taking daily medication.  Take prescribed pain medication with food.  If no medication is prescribed, you may take non-aspirin pain medication if needed.  PAIN MEDICATION CAN BE VERY CONSTIPATING.  Take a stool softener or laxative such as senokot, pericolace, or milk of magnesia if needed.    Last pain medication oxycodone 5 mg given at 1:16 pm.    Post Angiogram Groin Care Instructions     INSTRUCTIONS  Examine (look and feel) the site of your incision site TODAY so you can recognize changes that should be called to your doctor (see below).  Avoid straining either by lifting or pulling objects for 4-5 days. Avoid lifting over 5 pounds.   For at least 72 hours, if you should sneeze or cough, please hold pressure over your groin area.  If you should begin to have oozing from the catheterization site, please hold firm pressure and call your doctor's office immediately.  If profuse bleeding occurs from the catheterization site, hold firm pressure and call \"911\" immediately for assistance.  Remove bandage after 24 hours.     ACTIVITY  Limit activity as instructed by your doctor.  No driving or very limited driving with frequent stops for one week.   If you must take a long car ride, stop every hour and walk around the car.   Warm showers or baths are permitted after the bandage is removed. Avoid hot showers, baths, hot tubs, and swimming for one week.    PLEASE CALL YOUR DOCTOR IF:  Temperature elevation occurs.  Catheterization site becomes reddened or begins to drain.   Bruising appears to be new or not resolving. The bruise may move down your leg. This is normal.  The small round lump in the groin increases in size.  Any leg numbness, aching, or discomfort (immediately).  Increasing discomfort in the leg at the insertion site.  Chest pains, even if " relieved by Nitroglycerin.    MISCELLANEOUS INSTRUCTIONS  Bruising may occur as a result of heart catheterization. Some of the discoloration may travel down the leg, going from blue to green in color.  A small round lump under the catheterization site will remain for up to six weeks.  If any questions arise call your physician's office. The Contact Center is open Monday through Friday 7AM to 5PM and may speak to a nurse at [phone], or toll free at [phone].   You should call 911 if you develop problems with breathing or chest pain.    FOR PROBLEMS CALL INTERVENTIONAL RADIOLOGY [phone]    I acknowledge receipt and understanding of these Home Care instructions.

## 2022-07-11 NOTE — PROGRESS NOTES
IR Nursing Note      Cerebral Angiogram by MD Trujillo assisted by RT Jordan, Right Femoral Artery access site.    Access site sealed with Angioseal, covered with gauze/tegaderm, C/D/I.    Report given to SHAY Sanders.  Patient transported to [facility] 8A via IR RN monitored then transferred care to report RN.    Angioseal VIP Vascular Closure Device, REF# [id number], LOT# [id number]

## 2022-07-11 NOTE — OR NURSING
Report given to aron DAY via SBAR reviewed orders and patient history, no questions at this time.  Pt alert and oriented, resp even and nonlabored, in NAD, pt denies pain/nausea at this time. Pt moves all ext, follows commands and verbalized understanding  of poc and discharge/admission. Family notified via text/phone patient is moving to phase II/room. Will con't to monitor until patient has transitioned.

## 2022-07-11 NOTE — OR NURSING
Pt recovered uneventfully through phase 2. VSS, CSM inact. Pt rates pain as tolerable. Denies nausea, tolerating oral fluids and responding appropriately. Cath site intact, no drainage or swelling. Written discharge instructions reviewed and provided to patient and patient's , angioseal information packet provided, patient verbalized understanding of instructions and denies any questions or concerns. Physician's contact information provided. IV d/c'd. All belongings returned. Pt discharged via wheelchair to private vehicle at [facility].

## 2022-07-11 NOTE — OR SURGEON
Immediate Post- Operative Note        Findings: Right ICH      Procedure(s): Diagnostic cerebral angiogram      Estimated Blood Loss: Less than 5 ml        Complications: None            7/11/2022     12:19 PM     Ketan Trujillo M.D.

## 2022-07-11 NOTE — H&P
"History and Physical    Date: 7/11/2022    PCP: Yesenia Linares M.D.        HPI: This is a 71 y.o. female who has history of right ICA.    Past Medical History:   Diagnosis Date   • Anxiety    • Arthritis     \"everywhere\"   • Common bile duct (CBD) stricture     hx of in 1990s   • Degenerative joint disease     bulging discs in C-spine, L-spine   • Depression    • Fibromyalgia    • High cholesterol    • History of mammogram     9/11, diagnostic showed breast cyst, rec 1 year   • Hyperlipidemia    • Hypertension    • Hypothyroid    • Pap test, as part of routine gynecological examination     last 2008, no abn history   • Pneumonia 07/08/2022    hx of, reports nothing recent   • Post concussive syndrome     after fall in 1999   • PTSD (post-traumatic stress disorder)    • S/P colonoscopy     nl at age 52, due 2013   • Skin cancer     SCC   • Snoring    • Stroke (HCC)     2021 - no residual       Past Surgical History:   Procedure Laterality Date   • ARTHROPLASTY, [facility] JOINT, USING FLEXOR CARPI RADIALIS TENDON Right 04/19/2022    Procedure: RIGHT THUMB CARPOMETACARPAL ARTHROPLASTY    CPT 91420   45 MINS;  Surgeon: Aldo Dobson M.D.;  Location: [facility];  Service: Orthopedics   • HIP CANNULATED SCREW Right 04/17/2017    Procedure: HIP CANNULATED SCREW;  Surgeon: Dhaval Poole M.D.;  Location: SURGERY [facility];  Service:    • PRIMARY C SECTION  1980   • ESTEFANÍA BY LAPAROSCOPY     • EGD W/ENDOSCOPIC ULTRASOUND      1990s, common bile duct stricture, no stent       Current Facility-Administered Medications   Medication Dose Route Frequency Provider Last Rate Last Admin   • NS infusion 1,000 mL  1,000 mL Intravenous Pre-Op Once Yosef Hurst M.D.            Social History     Socioeconomic History   • Marital status:      Spouse name: Not on file   • Number of children: Not on file   • Years of education: Not on file   • Highest education level: Not on file   Occupational " History   • Not on file   Tobacco Use   • Smoking status: Never Smoker   • Smokeless tobacco: Never Used   Vaping Use   • Vaping Use: Never used   Substance and Sexual Activity   • Alcohol use: Yes     Alcohol/week: 1.2 oz     Types: 2 Glasses of wine per week     Comment: reports 2/month   • Drug use: Not Currently   • Sexual activity: Yes     Partners: Male     Birth control/protection: None     Comment:    Other Topics Concern   • Not on file   Social History Narrative   • Not on file     Social Determinants of Health     Financial Resource Strain: Not on file   Food Insecurity: Not on file   Transportation Needs: Not on file   Physical Activity: Not on file   Stress: Not on file   Social Connections: Not on file   Intimate Partner Violence: Not on file   Housing Stability: Not on file       Family History   Problem Relation Age of Onset   • Cancer Mother         BREAST   • Hypertension Mother    • Hyperlipidemia Mother    • Hypertension Father    • Hyperlipidemia Father    • Cancer Father    • No Known Problems Brother    • Heart Disease Maternal Grandmother    • Hypertension Maternal Grandmother    • Heart Disease Maternal Grandfather    • No Known Problems Paternal Grandmother    • Heart Disease Paternal Grandfather    • Stroke Paternal Grandfather        Allergies:  Ampicillin and Latex    Review of Systems:  ICA    Physical Exam:    Alert and oriented.    Vital Signs  Blood Pressure : (!) 152/71   Temperature: 36.3 °C (97.4 °F)   Pulse: 70   Respiration: 20   Pulse Oximetry: 94 %        Labs:  Recent Labs     07/11/22  0915   WBC 5.5   RBC 4.50   HEMOGLOBIN 14.7   HEMATOCRIT 44.2   MCV 98.2*   MCH 32.7   MCHC 33.3*   RDW 41.1   PLATELETCT 258   MPV 9.1     Recent Labs     07/11/22  0915   CREATININE 0.80     Recent Labs     07/11/22  0915   INR 0.89     Recent Labs     07/11/22  0915   INR 0.89       Radiology:  IR-CAROTID-CEREBRAL BILATERAL    (Results Pending)             Assessment and Plan:This is  a 71 y.o.with h/o right ICA    Plan:Diagnostic angiogram to rule out aneurysm or AVM

## 2022-07-11 NOTE — OR NURSING
1435 - Arrived to Phase II, VSS, ambulate to chair. R groin site soft with dressing gauze and transparent film CDI. Reports headache that was present on arrival, denies pain to groin site.  to bedside. Tolerating PO.    1450 - Getting dressed. Handoff report to Vish DAY.

## 2022-07-27 NOTE — PROGRESS NOTES
"Neuro Interventional Service Consultation      Re: Elizabeth Murphy     MRN: [id number]   : 1950    Elizabeth Murphy was referred to our service by Shari Regalado MD. She is a 71 y.o. 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 She is also under the care of Yesenia Linares MD.    History of Present Illness:   presented with altered level of consciousness from a right parenchymal hematoma in May 2021. There were no imaging findings for bleeding source at the time of her event. She was hospitalized and initially had left sided weakness, but that has resolved. There was suspicion for an occult vascular malformation, and she ultimately underwent a catheter angiogram in 2022 that did not reveal any findings in the immediate proximity to the area in hemorrhage, however there was a right sigmoid sinus dAVF. The patient has been referred to the Neuro Interventional Service for evaluation and management of this finding.     She is seen today for review of imaging studies and discussion of possible dural arteriovenous fistula repair. Today, the patient reports a prior posterior head trauma in . She reports right tinnitus, both ringing and pulsatile, along with \"banging\" sometimes. She has lost some hearing in her right ear. She reports some mild visual deterioration since her hemorrhage. She did not have any immediate trauma prior to her hemorrhage. She reports frequent headaches that she manages with caffeine. She had a headache one day post angiogram that was slow to resolve but Tylenol and caffeine helped. She denies recent seizure. Blood pressure is reported to be controlled with medications. The patient has had anesthesia in the past and reports no problems or aftereffects. She lives in [place] and is retired. She is accompanied by a support person Binu (spouse) to today's consultation.    Past Medical History:   Diagnosis Date   • Anxiety " Chief complaint:   Chief Complaint   Patient presents with   • Back Pain     2       Vitals:  Visit Vitals  /87   Pulse 99   Temp 98.3 °F (36.8 °C)   Resp 17   Ht 5' 3\" (1.6 m)   Wt 67.9 kg   SpO2 98%   BMI 26.52 kg/m²       HISTORY OF PRESENT ILLNESS     70-year-old female with a past medical history dyslipidemia presents to the Urgent Care today with a chief complaint of right-sided back pain.  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  Denies any paresthesia paralysis or weakness of the extremities.  Pain does not radiate.  Pain is worsened with positional changes and palpation.  Denies any chest pains, palpitations, shortness of breath.  She has not taken any pain medications.  3/10 on pain scale.  No other modifying factors.      Other significant problems:  Patient Active Problem List    Diagnosis Date Noted   • Dyslipidemia 09/13/2016     Priority: Low   • GERD (gastroesophageal reflux disease) 12/12/2012     Priority: Low       PAST MEDICAL, FAMILY AND SOCIAL HISTORY     Medications:  Current Outpatient Medications   Medication   • atorvastatin (LIPITOR) 20 MG tablet   • vitamin - therapeutic multivitamins w/minerals (CENTRUM SILVER,THERA-M) TABS   • naproxen (NAPROSYN) 500 MG tablet   • lidocaine (Lidoderm) 5 % patch   • Baclofen 5 MG tablet   • metoPROLOL succinate (TOPROL XL) 25 MG 24 hr tablet   • acetaminophen (TYLENOL) 325 MG tablet   • cholecalciferol (VITAMIN D3) 1000 UNITS tablet     No current facility-administered medications for this visit.       Allergies:  ALLERGIES:  No Known Allergies    Past Medical  History/Surgeries:  Past Medical History:   Diagnosis Date   • Arthritis    • Chest pain    • Depression    • Esophageal reflux    • Essential (primary) hypertension    • Language barrier     speaks  "   • Arthritis     \"everywhere\"   • Common bile duct (CBD) stricture     hx of in 1990s   • Degenerative joint disease     bulging discs in C-spine, L-spine   • Depression    • Fibromyalgia    • High cholesterol    • History of mammogram     9/11, diagnostic showed breast cyst, rec 1 year   • Hyperlipidemia    • Hypertension    • Hypothyroid    • Pap test, as part of routine gynecological examination     last 2008, no abn history   • Pneumonia 07/08/2022    hx of, reports nothing recent   • Post concussive syndrome     after fall in 1999   • PTSD (post-traumatic stress disorder)    • S/P colonoscopy     nl at age 52, due 2013   • Skin cancer     SCC   • Snoring    • Stroke (HCC)     2021 - no residual     Past Surgical History:   Procedure Laterality Date   • ARTHROPLASTY, [facility] JOINT, USING FLEXOR CARPI RADIALIS TENDON Right 04/19/2022    Procedure: RIGHT THUMB CARPOMETACARPAL ARTHROPLASTY    CPT 41161   45 MINS;  Surgeon: Aldo Dobson M.D.;  Location: [facility];  Service: Orthopedics   • HIP CANNULATED SCREW Right 04/17/2017    Procedure: HIP CANNULATED SCREW;  Surgeon: Dhaval Poole M.D.;  Location: SURGERY [facility];  Service:    • PRIMARY C SECTION  1980   • ESTEFANÍA BY LAPAROSCOPY     • EGD W/ENDOSCOPIC ULTRASOUND      1990s, common bile duct stricture, no stent     Social History     Socioeconomic History   • Marital status:      Spouse name: Not on file   • Number of children: Not on file   • Years of education: Not on file   • Highest education level: Not on file   Occupational History   • Not on file   Tobacco Use   • Smoking status: Never Smoker   • Smokeless tobacco: Never Used   Vaping Use   • Vaping Use: Never used   Substance and Sexual Activity   • Alcohol use: Yes     Alcohol/week: 1.2 oz     Types: 2 Glasses of wine per week     Comment: reports 2/month   • Drug use: Not Currently   • Sexual activity: Yes     Partners: Male     Birth control/protection: " Turkmen/Tagalog   • Subdural hematoma, post-traumatic (CMS/HCC) 11/18/2019   • Varicose veins        Past Surgical History:   Procedure Laterality Date   • Knee aspiration      bilateral  2003   • Mammo screening bilateral  10/06/2012   • Pap smear,routine  09/25/2012   • Removal gallbladder      11/2011   • Stress test  2014    WNL   • Tubal ligation      1985   • Vaginal delivery         Family History:  Family History   Problem Relation Age of Onset   • Arthritis Mother    • COPD Father    • Diabetes Father    • Heart disease Father    • Depression Sister    • High cholesterol Sister    • Hearing Loss Brother    • Asthma Son    • Hypertension Paternal Grandfather        Social History:  Social History     Tobacco Use   • Smoking status: Never Smoker   • Smokeless tobacco: Never Used   Substance Use Topics   • Alcohol use: No       REVIEW OF SYSTEMS     Pertinent positives and negatives included as above in HPI.    PHYSICAL EXAM     CONSTITUTIONAL:  Conversant, well-developed, NAD.  VITAL SIGNS: Reviewed.  RESPIRATORY:  Normal respiratory effort.  CARDIOVASCULAR:  Peripheral pulses to the extremities intact and 3+ with capillary refill less than 2 seconds.  NEURO:  Ambulates with a normal gait.  Sensation intact to the extremities.  Strength is flow to the extremities.  MUSCULOSKELETAL:  No tenderness on palpation of the midline thoracic or lumbar spine.  There is tenderness on palpation to the right-sided thoracic musculature no overlying ecchymosis or edema.  No tenderness on palpation posterior ribs.  Muscle strength and tone appear otherwise unremarkable.    ASSESSMENT/PLAN     Following initial evaluation the case was discussed with patient.  Vital signs reviewed.  Multiple differential diagnosis considered.  Patient has reproducible right-sided thoracic back pain that is not overlying any bony prominences.  Recommendation is naproxen, baclofen, Lidoderm patches.  I would like the patient follow-up with her  None     Comment:    Other Topics Concern   • Not on file   Social History Narrative   • Not on file     Social Determinants of Health     Financial Resource Strain: Not on file   Food Insecurity: Not on file   Transportation Needs: Not on file   Physical Activity: Not on file   Stress: Not on file   Social Connections: Not on file   Intimate Partner Violence: Not on file   Housing Stability: Not on file     Family History   Problem Relation Age of Onset   • Cancer Mother         BREAST   • Hypertension Mother    • Hyperlipidemia Mother    • Hypertension Father    • Hyperlipidemia Father    • Cancer Father    • No Known Problems Brother    • Heart Disease Maternal Grandmother    • Hypertension Maternal Grandmother    • Heart Disease Maternal Grandfather    • No Known Problems Paternal Grandmother    • Heart Disease Paternal Grandfather    • Stroke Paternal Grandfather        Review of Systems   Constitutional: Negative.  Negative for chills, diaphoresis, fever, malaise/fatigue and weight loss.   HENT: Positive for hearing loss (right) and tinnitus (right).    Eyes:        Positive for visual acuity change   Respiratory: Negative.    Cardiovascular: Negative.    Gastrointestinal: Negative.    Skin: Negative.    Neurological: Positive for headaches. Negative for sensory change, speech change, focal weakness, seizures and weakness.   Psychiatric/Behavioral: Negative for substance abuse.       A comprehensive 14-point review of systems was negative except as described above.     Labs:    Latest Reference Range & Units 07/11/22 09:15   WBC 4.8 - 10.8 K/uL 5.5   RBC 4.20 - 5.40 M/uL 4.50   Hemoglobin 12.0 - 16.0 g/dL 14.7   Hematocrit 37.0 - 47.0 % 44.2   MCV 81.4 - 97.8 fL 98.2 (H)   MCH 27.0 - 33.0 pg 32.7   MCHC 33.6 - 35.0 g/dL 33.3 (L)   RDW 35.9 - 50.0 fL 41.1   Platelet Count 164 - 446 K/uL 258   MPV 9.0 - 12.9 fL 9.1      Latest Reference Range & Units 07/11/22 09:15   Creatinine 0.50 - 1.40 mg/dL 0.80  PCP as she may need physical therapy if symptoms do not improve.  I do not recommend imaging at this time patient is discharged in stable condition advised to return for worsening symptoms.    I informed the patient about the availability of reasonable alternate medical modes of treatment and about the the benefits and risks of these treatments. Anticipatory guidance provided. Patient expressed understanding and is agreeable to the plan.  I also advised should symptoms worsen, patient is to return to the urgent care clinic or emergency department. Patient is discharged in good condition.    Patient seen and evaluated by Fox Polanco PA-C   Patient seen in collaboration with Dr. Terrie Mota.     GFR (CKD-EPI) >60 mL/min/1.73 m 2 79      Latest Reference Range & Units 07/11/22 09:15   PT 12.0 - 14.6 sec 12.0   INR 0.87 - 1.13  0.89       Radiology:   Neurointerventional Radiology Procedure cerebral angiogram 7/11/22 at [facility]:  1.  Right sigmoid sinus dural AV fistula.  2.  Neuro interventional outpatient clinic visit was recommended to discuss the possibility of dural AV fistula repair.  3.  There is no evidence of vascular nidus in the right frontal lobe.      MRI brain w/ and w/o 6/16/21 [place]:  Subacute parenchymal hemorrhage in the right frontal lobe shows interval   decrease in size since prior MRI and near complete resolution of surrounding   edema.     There is smooth pachymeningeal dural thickening and enhancement in the right   which is likely reactive.  Thin superimposed subacute subdural blood along   along the right temporal convexity    CT Head w/o 5/17/21 at [place]:  1. There is an area of acute/subacute lobular hemorrhage within the right   frontal lobe measuring 4.6 cm x 2.2 cm x 3.8 cm ( Vol:19.2 cc ) which   appears to be overall stable in size considering differences in technique.   There is adjacent mass effect and edema and adjacent subarachnoid   hemorrhage.  There is right side subdural hemorrhage measuring 0.4 cm   without change.  There is small amount of hemorrhage contiguous with the   interhemispheric fissure, primarily anteriorly without change.  There is   associated compression of the body of the right lateral ventricle with   midline shift from right-to-left measuring 0.35 cm.  These findings are   stable.  Recommend additional close follow-up to assess ability including   MRI for additional evaluation.     2.  Please see above.     MRI brain w/o 5/12/21 at [place]:  IMPRESSION: Subcortical hemorrhage in the right frontal region appears stable.   Underlying mass or AVM are a consideration.  No evidence of amyloid   angiopathy.    [facility] head 5/12/21 [facility]:  No evidence of active extravasation at the site of the right frontal lobe   parenchymal hematoma.     No intracranial arterial occlusion or significant stenosis.     Stable right frontotemporal subdural hematoma.     CT head w/o 5/12/21 at [place]:  There is a 4.1 cm right frontal lobe acute parenchymal hemorrhage with   surrounding edema.  An underlying infarct or mass is not excluded.  MRI   of the brain with and without intravenous contrast may be considered for   further evaluation.  4 mm right cervical convexity subdural hematoma.   Small amount of acute right frontal subarachnoid hemorrhage.  Mild right   to left midline shift measures 3 mm.          Current Outpatient Medications   Medication Sig Dispense Refill   • amphetamine-dextroamphetamine (ADDERALL, 15MG,) 15 MG tablet Take 15 mg by mouth 2 times a day.     • EUTHYROX 50 MCG Tab Take 50 mcg by mouth every morning on an empty stomach.     • busPIRone (BUSPAR) 15 MG tablet Take 15 mg by mouth 3 times a day. Indications: Anxiety Disorder     • HYDROcodone/acetaminophen (NORCO)  MG Tab Take 1 Tablet by mouth every 8 hours as needed for Severe Pain. Indications: Pain     • citalopram (CELEXA) 40 MG Tab Take 40 mg by mouth every morning. Indications: Depression, Generalized Anxiety Disorder     • lisinopril (PRINIVIL) 20 MG Tab Take 20 mg by mouth every morning. Indications: High Blood Pressure Disorder     • gabapentin (NEURONTIN) 300 MG Cap Take 300-600 mg by mouth 4 times a day. 300 mg 3 times daily, 600 mg at night  Indications: Fibromyalgia Syndrome     • levETIRAcetam (KEPPRA) 250 MG tablet Take 250 mg by mouth 2 times a day. Indications: Seizure     • traZODone (DESYREL) 150 MG Tab Take 225 mg by mouth every evening. Takes 1 and 1/2 tablets  Indications: Trouble Sleeping     • Melatonin 5 MG Cap Take 5 mg by mouth every evening. Indications: Trouble Sleeping     • MAGNESIUM PO Take 1 Tablet by mouth every evening. Indications: Pain      • acetaminophen (TYLENOL) 500 MG Tab Take 1,000 mg by mouth 2 times a day as needed for Moderate Pain.     • bisoprolol (ZEBETA) 10 MG tablet Take 10 mg by mouth at bedtime. Indications: High Blood Pressure Disorder     • atorvastatin (LIPITOR) 40 MG Tab TAKE 1 TABLET BY MOUTH ONCE DAILY (Patient taking differently: Take 40 mg by mouth every evening.) 100 Tab 2   • omeprazole (PRILOSEC) 20 MG delayed-release capsule Take 20 mg by mouth every morning. Indications: Gastroesophageal Reflux Disease       No current facility-administered medications for this visit.       Allergies   Allergen Reactions   • Ampicillin Diarrhea     Patient stated.   • Latex Hives and Rash     .       Physical Exam  Constitutional:       General: She is not in acute distress.     Appearance: She is not diaphoretic.   HENT:      Head: Normocephalic.   Eyes:      General: No scleral icterus.  Pulmonary:      Effort: Pulmonary effort is normal. No respiratory distress.   Abdominal:      General: There is no distension.   Skin:     General: Skin is warm and dry.      Coloration: Skin is not jaundiced or pale.      Findings: No erythema or rash.   Neurological:      General: No focal deficit present.      Mental Status: She is alert and oriented to person, place, and time. She is not disoriented.      Cranial Nerves: No cranial nerve deficit or facial asymmetry.      Sensory: Sensation is intact.      Motor: No weakness or tremor.      Coordination: Coordination normal.      Gait: Gait is intact. Gait normal.   Psychiatric:         Mood and Affect: Mood and affect normal.         Behavior: Behavior normal.         Thought Content: Thought content normal.         Cognition and Memory: Memory normal.         Judgment: Judgment normal.     Impression:   1. 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  2. Right parenchymal hemorrhage, query secondary to above.  3. History of traumatic brain injury  posterior skull.  4. Right pulsatile tinnitus.  5. Hypertension.  6. Hyperlipidemia.    Plan:   Ketan Trujillo MD has reviewed 's history and imaging studies, examined the patient, and discussed treatment options.  has a dural arteriovenous fistula that may have been acquired from her remote head injury. We cannot definitively determine if slow venous outflow from the fistula caused the hemorrhage in 2021, but it is certainly possible. It is likely contributing to her right pulsatile tinnitus. Because we cannot be certain that the fistula is asymptomatic in terms of the hemorrhage, we recommend intervention. [facility] is the only [facility] that performs endovascular neurointervention and the procedure must be done at this facility. Overall procedure risk is approximately 2-3%. We discussed the method of the procedure at length including the possibility of the use of embolic agents to facilitate the procedure and associated risks of those devices. We additionally discussed the procedure risks, including bleeding and infection, damage to the arteries, reaction to any medications given during the procedure, side effects of contrast, radiation exposure, embolic agent migration, mechanical failure, stroke, hemorrhage, and death. There is a risk for unanticipated neurologic or non neurologic complications. There is a chance the fistula may ultimately not be able to be completely embolized or may not be amenable to endovascular intervention. After the procedure, there is a chance that the fistula could recur. We reviewed known risk factors for intracranial hemorrhage that include uncontrolled hypertension and modifiable risk factors were discussed. We explained that the patient will need to have ongoing surveillance imaging after the procedure, which will be managed by us, and that future treatment depends on multiple factors including lab studies, imaging, and  performance status. We discussed alternatives to the procedure including surveillance with no intervention as well as surgical intervention, which could be discussed with a surgeon, however we recommend endovascular repair given the location. The patient verbalizes understanding of risks, benefits, and alternatives and elects to proceed. Written pre- and post- procedure care instructions were provided. We discussed signs of a sentinel headache and stroke with instructions to call an ambulance for the onset of these symptoms. The patient has been scheduled for August 25. Depending on procedure outcome, she may be admitted to the intensive care unit following the procedure.       LARISSA Gregory with Ketan Trujillo MD  Neuro Interventional Service   [facility]   [id number])  [place] [address]  [phone]

## 2022-07-28 ENCOUNTER — HOSPITAL ENCOUNTER (OUTPATIENT)
Dept: RADIOLOGY | Facility: MEDICAL CENTER | Age: 72
End: 2022-07-28
Attending: NURSE PRACTITIONER
Payer: MEDICARE

## 2022-07-28 DIAGNOSIS — I77.0 AVF (ARTERIOVENOUS FISTULA) (HCC): ICD-10-CM

## 2022-07-28 DIAGNOSIS — Q28.2 CEREBRAL ARTERIOVENOUS MALFORMATION: ICD-10-CM

## 2022-07-28 ASSESSMENT — ENCOUNTER SYMPTOMS
DIAPHORESIS: 0
CARDIOVASCULAR NEGATIVE: 1
CONSTITUTIONAL NEGATIVE: 1
FOCAL WEAKNESS: 0
HEADACHES: 1
GASTROINTESTINAL NEGATIVE: 1
FEVER: 0
WEIGHT LOSS: 0
RESPIRATORY NEGATIVE: 1
WEAKNESS: 0
SEIZURES: 0
SENSORY CHANGE: 0
CHILLS: 0
SPEECH CHANGE: 0

## 2022-07-28 ASSESSMENT — LIFESTYLE VARIABLES: SUBSTANCE_ABUSE: 0

## 2022-08-24 ENCOUNTER — PRE-ADMISSION TESTING (OUTPATIENT)
Dept: ADMISSIONS | Facility: MEDICAL CENTER | Age: 72
DRG: 270 | End: 2022-08-24
Attending: RADIOLOGY
Payer: MEDICARE

## 2022-08-24 RX ORDER — LISINOPRIL 40 MG/1
40 TABLET ORAL DAILY
COMMUNITY
Start: 2022-07-18

## 2022-08-24 RX ORDER — LEVOTHYROXINE SODIUM 0.05 MG/1
50 TABLET ORAL
COMMUNITY
Start: 2022-04-21 | End: 2022-08-24

## 2022-08-24 RX ORDER — HYDROCODONE BITARTRATE AND ACETAMINOPHEN 5; 325 MG/1; MG/1
TABLET ORAL
COMMUNITY
Start: 2022-04-13 | End: 2022-08-24

## 2022-08-24 RX ORDER — BISOPROLOL FUMARATE 5 MG/1
5 TABLET, FILM COATED ORAL DAILY
COMMUNITY
Start: 2022-08-12 | End: 2022-08-24

## 2022-08-24 RX ORDER — EZETIMIBE 10 MG/1
10 TABLET ORAL EVERY EVENING
COMMUNITY
Start: 2022-08-22

## 2022-08-24 RX ORDER — BISOPROLOL FUMARATE 5 MG/1
5 TABLET, FILM COATED ORAL
COMMUNITY
Start: 2022-05-27 | End: 2022-08-24

## 2022-08-24 RX ORDER — LEVOTHYROXINE SODIUM 0.07 MG/1
TABLET ORAL
COMMUNITY
Start: 2022-04-19 | End: 2022-08-24

## 2022-08-24 RX ORDER — ATORVASTATIN CALCIUM 40 MG/1
40 TABLET, FILM COATED ORAL
COMMUNITY
Start: 2022-07-27 | End: 2022-08-24

## 2022-08-24 RX ORDER — LEVOTHYROXINE SODIUM 0.05 MG/1
50 TABLET ORAL DAILY
COMMUNITY

## 2022-08-24 RX ORDER — QUETIAPINE FUMARATE 100 MG/1
100 TABLET, FILM COATED ORAL
COMMUNITY
Start: 2022-04-13 | End: 2022-08-24

## 2022-08-24 NOTE — OR NURSING
Patient reports that she had a recent biopsy on her right leg and it is now infected.  Patient saw PCP and was prescribed mupirocin cream tid.  ERENDIRA GALLEGO updated on the above information 8/24/2022.

## 2022-08-25 ENCOUNTER — ANESTHESIA EVENT (OUTPATIENT)
Dept: RADIOLOGY | Facility: MEDICAL CENTER | Age: 72
DRG: 270 | End: 2022-08-25
Payer: MEDICARE

## 2022-08-25 ENCOUNTER — APPOINTMENT (OUTPATIENT)
Dept: RADIOLOGY | Facility: MEDICAL CENTER | Age: 72
DRG: 270 | End: 2022-08-25
Attending: RADIOLOGY
Payer: MEDICARE

## 2022-08-25 ENCOUNTER — ANESTHESIA (OUTPATIENT)
Dept: RADIOLOGY | Facility: MEDICAL CENTER | Age: 72
DRG: 270 | End: 2022-08-25
Payer: MEDICARE

## 2022-08-25 ENCOUNTER — HOSPITAL ENCOUNTER (INPATIENT)
Facility: MEDICAL CENTER | Age: 72
LOS: 1 days | DRG: 270 | End: 2022-08-26
Attending: RADIOLOGY
Payer: MEDICARE

## 2022-08-25 DIAGNOSIS — I67.1 CEREBRAL ANEURYSM, NONRUPTURED: ICD-10-CM

## 2022-08-25 DIAGNOSIS — I67.1 DURAL ARTERIOVENOUS FISTULA: ICD-10-CM

## 2022-08-25 PROBLEM — F41.9 ANXIETY: Chronic | Status: ACTIVE | Noted: 2017-04-17

## 2022-08-25 LAB
ALBUMIN SERPL BCP-MCNC: 4.9 G/DL (ref 3.2–4.9)
ALBUMIN/GLOB SERPL: 2 G/DL
ALP SERPL-CCNC: 89 U/L (ref 30–99)
ALT SERPL-CCNC: 14 U/L (ref 2–50)
ANION GAP SERPL CALC-SCNC: 9 MMOL/L (ref 7–16)
AST SERPL-CCNC: 16 U/L (ref 12–45)
BILIRUB SERPL-MCNC: 0.2 MG/DL (ref 0.1–1.5)
BUN SERPL-MCNC: 15 MG/DL (ref 8–22)
CALCIUM SERPL-MCNC: 9.4 MG/DL (ref 8.5–10.5)
CHLORIDE SERPL-SCNC: 103 MMOL/L (ref 96–112)
CO2 SERPL-SCNC: 28 MMOL/L (ref 20–33)
CREAT SERPL-MCNC: 0.8 MG/DL (ref 0.5–1.4)
ERYTHROCYTE [DISTWIDTH] IN BLOOD BY AUTOMATED COUNT: 38.3 FL (ref 35.9–50)
GFR SERPLBLD CREATININE-BSD FMLA CKD-EPI: 79 ML/MIN/1.73 M 2
GLOBULIN SER CALC-MCNC: 2.4 G/DL (ref 1.9–3.5)
GLUCOSE SERPL-MCNC: 104 MG/DL (ref 65–99)
HCT VFR BLD AUTO: 41.7 % (ref 37–47)
HGB BLD-MCNC: 14.1 G/DL (ref 12–16)
INR PPP: 0.9 (ref 0.87–1.13)
MCH RBC QN AUTO: 31.5 PG (ref 27–33)
MCHC RBC AUTO-ENTMCNC: 33.8 G/DL (ref 33.6–35)
MCV RBC AUTO: 93.1 FL (ref 81.4–97.8)
PLATELET # BLD AUTO: 213 K/UL (ref 164–446)
PMV BLD AUTO: 9.4 FL (ref 9–12.9)
POTASSIUM SERPL-SCNC: 4.6 MMOL/L (ref 3.6–5.5)
PROT SERPL-MCNC: 7.3 G/DL (ref 6–8.2)
PROTHROMBIN TIME: 12.1 SEC (ref 12–14.6)
RBC # BLD AUTO: 4.48 M/UL (ref 4.2–5.4)
SODIUM SERPL-SCNC: 140 MMOL/L (ref 135–145)
WBC # BLD AUTO: 5 K/UL (ref 4.8–10.8)

## 2022-08-25 PROCEDURE — A9270 NON-COVERED ITEM OR SERVICE: HCPCS | Performed by: ANESTHESIOLOGY

## 2022-08-25 PROCEDURE — 700102 HCHG RX REV CODE 250 W/ 637 OVERRIDE(OP): Performed by: ANESTHESIOLOGY

## 2022-08-25 PROCEDURE — 99291 CRITICAL CARE FIRST HOUR: CPT | Performed by: NURSE PRACTITIONER

## 2022-08-25 PROCEDURE — 700101 HCHG RX REV CODE 250: Performed by: ANESTHESIOLOGY

## 2022-08-25 PROCEDURE — 700102 HCHG RX REV CODE 250 W/ 637 OVERRIDE(OP): Performed by: NURSE PRACTITIONER

## 2022-08-25 PROCEDURE — 80053 COMPREHEN METABOLIC PANEL: CPT

## 2022-08-25 PROCEDURE — 36620 INSERTION CATHETER ARTERY: CPT | Mod: 59,LT | Performed by: ANESTHESIOLOGY

## 2022-08-25 PROCEDURE — 61624 TCAT PERM OCCLS/EMBOLJ CNS: CPT

## 2022-08-25 PROCEDURE — 85027 COMPLETE CBC AUTOMATED: CPT

## 2022-08-25 PROCEDURE — 700111 HCHG RX REV CODE 636 W/ 250 OVERRIDE (IP): Performed by: ANESTHESIOLOGY

## 2022-08-25 PROCEDURE — 700111 HCHG RX REV CODE 636 W/ 250 OVERRIDE (IP)

## 2022-08-25 PROCEDURE — 700105 HCHG RX REV CODE 258: Performed by: RADIOLOGY

## 2022-08-25 PROCEDURE — 770022 HCHG ROOM/CARE - ICU (200)

## 2022-08-25 PROCEDURE — 03LG3DZ OCCLUSION OF INTRACRANIAL ARTERY WITH INTRALUMINAL DEVICE, PERCUTANEOUS APPROACH: ICD-10-PCS | Performed by: RADIOLOGY

## 2022-08-25 PROCEDURE — 01926 ANES IVNTL RAD ICR ICAR/AORT: CPT | Performed by: ANESTHESIOLOGY

## 2022-08-25 PROCEDURE — 160002 HCHG RECOVERY MINUTES (STAT)

## 2022-08-25 PROCEDURE — 160036 HCHG PACU - EA ADDL 30 MINS PHASE I

## 2022-08-25 PROCEDURE — 700117 HCHG RX CONTRAST REV CODE 255: Performed by: RADIOLOGY

## 2022-08-25 PROCEDURE — 160035 HCHG PACU - 1ST 60 MINS PHASE I

## 2022-08-25 PROCEDURE — A9270 NON-COVERED ITEM OR SERVICE: HCPCS | Performed by: NURSE PRACTITIONER

## 2022-08-25 PROCEDURE — 700111 HCHG RX REV CODE 636 W/ 250 OVERRIDE (IP): Performed by: NURSE PRACTITIONER

## 2022-08-25 PROCEDURE — 85610 PROTHROMBIN TIME: CPT

## 2022-08-25 RX ORDER — LEVOTHYROXINE SODIUM 0.05 MG/1
50 TABLET ORAL DAILY
Status: DISCONTINUED | OUTPATIENT
Start: 2022-08-25 | End: 2022-08-26 | Stop reason: HOSPADM

## 2022-08-25 RX ORDER — CITALOPRAM 40 MG/1
40 TABLET ORAL EVERY MORNING
Status: DISCONTINUED | OUTPATIENT
Start: 2022-08-26 | End: 2022-08-26 | Stop reason: HOSPADM

## 2022-08-25 RX ORDER — EZETIMIBE 10 MG/1
10 TABLET ORAL EVERY EVENING
Status: DISCONTINUED | OUTPATIENT
Start: 2022-08-25 | End: 2022-08-26 | Stop reason: HOSPADM

## 2022-08-25 RX ORDER — GABAPENTIN 300 MG/1
300 CAPSULE ORAL 3 TIMES DAILY
Status: DISCONTINUED | OUTPATIENT
Start: 2022-08-25 | End: 2022-08-26 | Stop reason: HOSPADM

## 2022-08-25 RX ORDER — METOCLOPRAMIDE HYDROCHLORIDE 5 MG/ML
INJECTION INTRAMUSCULAR; INTRAVENOUS PRN
Status: DISCONTINUED | OUTPATIENT
Start: 2022-08-25 | End: 2022-08-25 | Stop reason: SURG

## 2022-08-25 RX ORDER — HEPARIN SODIUM 1000 [USP'U]/ML
INJECTION, SOLUTION INTRAVENOUS; SUBCUTANEOUS
Status: COMPLETED
Start: 2022-08-25 | End: 2022-08-25

## 2022-08-25 RX ORDER — CEFAZOLIN SODIUM 1 G/3ML
INJECTION, POWDER, FOR SOLUTION INTRAMUSCULAR; INTRAVENOUS PRN
Status: DISCONTINUED | OUTPATIENT
Start: 2022-08-25 | End: 2022-08-25 | Stop reason: SURG

## 2022-08-25 RX ORDER — NITROGLYCERIN 20 MG/100ML
INJECTION INTRAVENOUS PRN
Status: DISCONTINUED | OUTPATIENT
Start: 2022-08-25 | End: 2022-08-25 | Stop reason: SURG

## 2022-08-25 RX ORDER — LISINOPRIL 20 MG/1
40 TABLET ORAL DAILY
Status: DISCONTINUED | OUTPATIENT
Start: 2022-08-25 | End: 2022-08-26 | Stop reason: HOSPADM

## 2022-08-25 RX ORDER — LABETALOL HYDROCHLORIDE 5 MG/ML
INJECTION, SOLUTION INTRAVENOUS PRN
Status: DISCONTINUED | OUTPATIENT
Start: 2022-08-25 | End: 2022-08-25 | Stop reason: SURG

## 2022-08-25 RX ORDER — DEXAMETHASONE SODIUM PHOSPHATE 4 MG/ML
INJECTION, SOLUTION INTRA-ARTICULAR; INTRALESIONAL; INTRAMUSCULAR; INTRAVENOUS; SOFT TISSUE PRN
Status: DISCONTINUED | OUTPATIENT
Start: 2022-08-25 | End: 2022-08-25 | Stop reason: SURG

## 2022-08-25 RX ORDER — TRAZODONE HYDROCHLORIDE 150 MG/1
225 TABLET ORAL NIGHTLY
Status: DISCONTINUED | OUTPATIENT
Start: 2022-08-25 | End: 2022-08-26 | Stop reason: HOSPADM

## 2022-08-25 RX ORDER — LABETALOL HYDROCHLORIDE 5 MG/ML
10-20 INJECTION, SOLUTION INTRAVENOUS EVERY 4 HOURS PRN
Status: DISCONTINUED | OUTPATIENT
Start: 2022-08-25 | End: 2022-08-26 | Stop reason: HOSPADM

## 2022-08-25 RX ORDER — AMOXICILLIN 250 MG
2 CAPSULE ORAL 2 TIMES DAILY
Status: DISCONTINUED | OUTPATIENT
Start: 2022-08-25 | End: 2022-08-26 | Stop reason: HOSPADM

## 2022-08-25 RX ORDER — ONDANSETRON 2 MG/ML
INJECTION INTRAMUSCULAR; INTRAVENOUS PRN
Status: DISCONTINUED | OUTPATIENT
Start: 2022-08-25 | End: 2022-08-25 | Stop reason: SURG

## 2022-08-25 RX ORDER — HYDROCODONE BITARTRATE AND ACETAMINOPHEN 10; 325 MG/1; MG/1
1 TABLET ORAL EVERY 8 HOURS PRN
Status: DISCONTINUED | OUTPATIENT
Start: 2022-08-25 | End: 2022-08-26 | Stop reason: HOSPADM

## 2022-08-25 RX ORDER — MIDAZOLAM HYDROCHLORIDE 1 MG/ML
INJECTION INTRAMUSCULAR; INTRAVENOUS
Status: COMPLETED
Start: 2022-08-25 | End: 2022-08-25

## 2022-08-25 RX ORDER — HYDROMORPHONE HYDROCHLORIDE 1 MG/ML
0.1 INJECTION, SOLUTION INTRAMUSCULAR; INTRAVENOUS; SUBCUTANEOUS
Status: DISCONTINUED | OUTPATIENT
Start: 2022-08-25 | End: 2022-08-25 | Stop reason: HOSPADM

## 2022-08-25 RX ORDER — OMEPRAZOLE 20 MG/1
20 CAPSULE, DELAYED RELEASE ORAL EVERY MORNING
Status: DISCONTINUED | OUTPATIENT
Start: 2022-08-26 | End: 2022-08-26 | Stop reason: HOSPADM

## 2022-08-25 RX ORDER — ACETAMINOPHEN 325 MG/1
650 TABLET ORAL EVERY 6 HOURS PRN
Status: DISCONTINUED | OUTPATIENT
Start: 2022-08-25 | End: 2022-08-26 | Stop reason: HOSPADM

## 2022-08-25 RX ORDER — DIPHENHYDRAMINE HYDROCHLORIDE 50 MG/ML
12.5 INJECTION INTRAMUSCULAR; INTRAVENOUS
Status: DISCONTINUED | OUTPATIENT
Start: 2022-08-25 | End: 2022-08-25 | Stop reason: HOSPADM

## 2022-08-25 RX ORDER — BISACODYL 10 MG
10 SUPPOSITORY, RECTAL RECTAL
Status: DISCONTINUED | OUTPATIENT
Start: 2022-08-25 | End: 2022-08-26 | Stop reason: HOSPADM

## 2022-08-25 RX ORDER — HYDRALAZINE HYDROCHLORIDE 20 MG/ML
20 INJECTION INTRAMUSCULAR; INTRAVENOUS EVERY 4 HOURS PRN
Status: DISCONTINUED | OUTPATIENT
Start: 2022-08-25 | End: 2022-08-26 | Stop reason: HOSPADM

## 2022-08-25 RX ORDER — DEXTROAMPHETAMINE SACCHARATE, AMPHETAMINE ASPARTATE, DEXTROAMPHETAMINE SULFATE AND AMPHETAMINE SULFATE 2.5; 2.5; 2.5; 2.5 MG/1; MG/1; MG/1; MG/1
15 TABLET ORAL 2 TIMES DAILY
Status: DISCONTINUED | OUTPATIENT
Start: 2022-08-25 | End: 2022-08-25

## 2022-08-25 RX ORDER — SODIUM CHLORIDE, SODIUM LACTATE, POTASSIUM CHLORIDE, CALCIUM CHLORIDE 600; 310; 30; 20 MG/100ML; MG/100ML; MG/100ML; MG/100ML
INJECTION, SOLUTION INTRAVENOUS CONTINUOUS
Status: DISCONTINUED | OUTPATIENT
Start: 2022-08-25 | End: 2022-08-25 | Stop reason: HOSPADM

## 2022-08-25 RX ORDER — HYDRALAZINE HYDROCHLORIDE 20 MG/ML
5 INJECTION INTRAMUSCULAR; INTRAVENOUS
Status: DISCONTINUED | OUTPATIENT
Start: 2022-08-25 | End: 2022-08-25 | Stop reason: HOSPADM

## 2022-08-25 RX ORDER — IODIXANOL 270 MG/ML
120 INJECTION, SOLUTION INTRAVASCULAR ONCE
Status: COMPLETED | OUTPATIENT
Start: 2022-08-25 | End: 2022-08-25

## 2022-08-25 RX ORDER — ONDANSETRON 2 MG/ML
4 INJECTION INTRAMUSCULAR; INTRAVENOUS
Status: DISCONTINUED | OUTPATIENT
Start: 2022-08-25 | End: 2022-08-25 | Stop reason: HOSPADM

## 2022-08-25 RX ORDER — HYDROMORPHONE HYDROCHLORIDE 1 MG/ML
0.4 INJECTION, SOLUTION INTRAMUSCULAR; INTRAVENOUS; SUBCUTANEOUS
Status: DISCONTINUED | OUTPATIENT
Start: 2022-08-25 | End: 2022-08-25 | Stop reason: HOSPADM

## 2022-08-25 RX ORDER — HYDROMORPHONE HYDROCHLORIDE 1 MG/ML
0.2 INJECTION, SOLUTION INTRAMUSCULAR; INTRAVENOUS; SUBCUTANEOUS
Status: DISCONTINUED | OUTPATIENT
Start: 2022-08-25 | End: 2022-08-25 | Stop reason: HOSPADM

## 2022-08-25 RX ORDER — CHOLECALCIFEROL (VITAMIN D3) 125 MCG
5 CAPSULE ORAL NIGHTLY
Status: DISCONTINUED | OUTPATIENT
Start: 2022-08-25 | End: 2022-08-26 | Stop reason: HOSPADM

## 2022-08-25 RX ORDER — LEVETIRACETAM 250 MG/1
250 TABLET ORAL 2 TIMES DAILY
Status: DISCONTINUED | OUTPATIENT
Start: 2022-08-25 | End: 2022-08-26 | Stop reason: HOSPADM

## 2022-08-25 RX ORDER — LIDOCAINE HYDROCHLORIDE 20 MG/ML
INJECTION, SOLUTION EPIDURAL; INFILTRATION; INTRACAUDAL; PERINEURAL PRN
Status: DISCONTINUED | OUTPATIENT
Start: 2022-08-25 | End: 2022-08-25 | Stop reason: SURG

## 2022-08-25 RX ORDER — HALOPERIDOL 5 MG/ML
1 INJECTION INTRAMUSCULAR
Status: DISCONTINUED | OUTPATIENT
Start: 2022-08-25 | End: 2022-08-25 | Stop reason: HOSPADM

## 2022-08-25 RX ORDER — GABAPENTIN 300 MG/1
600 CAPSULE ORAL
Status: DISCONTINUED | OUTPATIENT
Start: 2022-08-25 | End: 2022-08-26 | Stop reason: HOSPADM

## 2022-08-25 RX ORDER — POLYETHYLENE GLYCOL 3350 17 G/17G
1 POWDER, FOR SOLUTION ORAL
Status: DISCONTINUED | OUTPATIENT
Start: 2022-08-25 | End: 2022-08-26 | Stop reason: HOSPADM

## 2022-08-25 RX ORDER — SODIUM CHLORIDE, SODIUM LACTATE, POTASSIUM CHLORIDE, CALCIUM CHLORIDE 600; 310; 30; 20 MG/100ML; MG/100ML; MG/100ML; MG/100ML
INJECTION, SOLUTION INTRAVENOUS CONTINUOUS
Status: ACTIVE | OUTPATIENT
Start: 2022-08-25 | End: 2022-08-25

## 2022-08-25 RX ORDER — LIDOCAINE HYDROCHLORIDE 40 MG/ML
SOLUTION TOPICAL
Status: DISPENSED
Start: 2022-08-25 | End: 2022-08-25

## 2022-08-25 RX ORDER — ALBUTEROL SULFATE 2.5 MG/3ML
2.5 SOLUTION RESPIRATORY (INHALATION)
Status: DISCONTINUED | OUTPATIENT
Start: 2022-08-25 | End: 2022-08-25 | Stop reason: HOSPADM

## 2022-08-25 RX ORDER — MEPERIDINE HYDROCHLORIDE 25 MG/ML
6.25 INJECTION INTRAMUSCULAR; INTRAVENOUS; SUBCUTANEOUS
Status: DISCONTINUED | OUTPATIENT
Start: 2022-08-25 | End: 2022-08-25 | Stop reason: HOSPADM

## 2022-08-25 RX ORDER — LABETALOL HYDROCHLORIDE 5 MG/ML
5 INJECTION, SOLUTION INTRAVENOUS
Status: DISCONTINUED | OUTPATIENT
Start: 2022-08-25 | End: 2022-08-25 | Stop reason: HOSPADM

## 2022-08-25 RX ORDER — ATORVASTATIN CALCIUM 40 MG/1
40 TABLET, FILM COATED ORAL EVERY EVENING
Status: DISCONTINUED | OUTPATIENT
Start: 2022-08-25 | End: 2022-08-26 | Stop reason: HOSPADM

## 2022-08-25 RX ORDER — ETOMIDATE 2 MG/ML
INJECTION INTRAVENOUS PRN
Status: DISCONTINUED | OUTPATIENT
Start: 2022-08-25 | End: 2022-08-25 | Stop reason: SURG

## 2022-08-25 RX ORDER — LISINOPRIL 20 MG/1
20 TABLET ORAL
Status: DISCONTINUED | OUTPATIENT
Start: 2022-08-25 | End: 2022-08-25

## 2022-08-25 RX ORDER — HEPARIN SODIUM,PORCINE 1000/ML
VIAL (ML) INJECTION PRN
Status: DISCONTINUED | OUTPATIENT
Start: 2022-08-25 | End: 2022-08-25 | Stop reason: SURG

## 2022-08-25 RX ORDER — DEXTROAMPHETAMINE SACCHARATE, AMPHETAMINE ASPARTATE, DEXTROAMPHETAMINE SULFATE AND AMPHETAMINE SULFATE 2.5; 2.5; 2.5; 2.5 MG/1; MG/1; MG/1; MG/1
15 TABLET ORAL 2 TIMES DAILY
Status: DISCONTINUED | OUTPATIENT
Start: 2022-08-26 | End: 2022-08-26 | Stop reason: HOSPADM

## 2022-08-25 RX ORDER — LISINOPRIL 20 MG/1
40 TABLET ORAL DAILY
Status: DISCONTINUED | OUTPATIENT
Start: 2022-08-25 | End: 2022-08-25

## 2022-08-25 RX ORDER — BISOPROLOL FUMARATE 5 MG/1
10 TABLET, FILM COATED ORAL
Status: DISCONTINUED | OUTPATIENT
Start: 2022-08-25 | End: 2022-08-26 | Stop reason: HOSPADM

## 2022-08-25 RX ADMIN — SODIUM CHLORIDE, POTASSIUM CHLORIDE, SODIUM LACTATE AND CALCIUM CHLORIDE: 600; 310; 30; 20 INJECTION, SOLUTION INTRAVENOUS at 08:47

## 2022-08-25 RX ADMIN — ACETAMINOPHEN 650 MG: 325 TABLET, FILM COATED ORAL at 19:11

## 2022-08-25 RX ADMIN — PROPOFOL 50 MG: 10 INJECTION, EMULSION INTRAVENOUS at 10:09

## 2022-08-25 RX ADMIN — LABETALOL HYDROCHLORIDE 5 MG: 5 INJECTION, SOLUTION INTRAVENOUS at 13:16

## 2022-08-25 RX ADMIN — FENTANYL CITRATE 25 MCG: 50 INJECTION, SOLUTION INTRAMUSCULAR; INTRAVENOUS at 13:58

## 2022-08-25 RX ADMIN — ONDANSETRON 4 MG: 2 INJECTION INTRAMUSCULAR; INTRAVENOUS at 12:58

## 2022-08-25 RX ADMIN — SUGAMMADEX 200 MG: 100 INJECTION, SOLUTION INTRAVENOUS at 13:11

## 2022-08-25 RX ADMIN — NITROGLYCERIN 50 MCG: 20 INJECTION INTRAVENOUS at 11:43

## 2022-08-25 RX ADMIN — METOCLOPRAMIDE 10 MG: 5 INJECTION, SOLUTION INTRAMUSCULAR; INTRAVENOUS at 10:00

## 2022-08-25 RX ADMIN — FENTANYL CITRATE 25 MCG: 50 INJECTION, SOLUTION INTRAMUSCULAR; INTRAVENOUS at 14:11

## 2022-08-25 RX ADMIN — FENTANYL CITRATE 100 MCG: 50 INJECTION, SOLUTION INTRAMUSCULAR; INTRAVENOUS at 10:09

## 2022-08-25 RX ADMIN — FENTANYL CITRATE 50 MCG: 50 INJECTION, SOLUTION INTRAMUSCULAR; INTRAVENOUS at 15:35

## 2022-08-25 RX ADMIN — LIDOCAINE HYDROCHLORIDE 80 MG: 20 INJECTION, SOLUTION EPIDURAL; INFILTRATION; INTRACAUDAL at 10:09

## 2022-08-25 RX ADMIN — BUSPIRONE HYDROCHLORIDE 15 MG: 10 TABLET ORAL at 22:19

## 2022-08-25 RX ADMIN — HYDROCODONE BITARTRATE AND ACETAMINOPHEN 7.5 MG: 7.5; 325 SOLUTION ORAL at 13:58

## 2022-08-25 RX ADMIN — EPHEDRINE SULFATE 5 MG: 50 INJECTION INTRAMUSCULAR; INTRAVENOUS; SUBCUTANEOUS at 11:05

## 2022-08-25 RX ADMIN — GABAPENTIN 300 MG: 300 CAPSULE ORAL at 17:22

## 2022-08-25 RX ADMIN — GABAPENTIN 600 MG: 300 CAPSULE ORAL at 22:18

## 2022-08-25 RX ADMIN — EPHEDRINE SULFATE 5 MG: 50 INJECTION INTRAMUSCULAR; INTRAVENOUS; SUBCUTANEOUS at 11:04

## 2022-08-25 RX ADMIN — EPHEDRINE SULFATE 5 MG: 50 INJECTION INTRAMUSCULAR; INTRAVENOUS; SUBCUTANEOUS at 11:06

## 2022-08-25 RX ADMIN — DOCUSATE SODIUM 50 MG AND SENNOSIDES 8.6 MG 2 TABLET: 8.6; 5 TABLET, FILM COATED ORAL at 17:23

## 2022-08-25 RX ADMIN — SODIUM CHLORIDE, POTASSIUM CHLORIDE, SODIUM LACTATE AND CALCIUM CHLORIDE: 600; 310; 30; 20 INJECTION, SOLUTION INTRAVENOUS at 10:00

## 2022-08-25 RX ADMIN — HEPARIN SODIUM 5000 UNITS: 1000 INJECTION, SOLUTION INTRAVENOUS; SUBCUTANEOUS at 10:47

## 2022-08-25 RX ADMIN — FENTANYL CITRATE 50 MCG: 50 INJECTION, SOLUTION INTRAMUSCULAR; INTRAVENOUS at 12:47

## 2022-08-25 RX ADMIN — HYDRALAZINE HYDROCHLORIDE 20 MG: 20 INJECTION INTRAMUSCULAR; INTRAVENOUS at 17:15

## 2022-08-25 RX ADMIN — BUSPIRONE HYDROCHLORIDE 15 MG: 10 TABLET ORAL at 17:18

## 2022-08-25 RX ADMIN — DEXAMETHASONE SODIUM PHOSPHATE 4 MG: 4 INJECTION, SOLUTION INTRA-ARTICULAR; INTRALESIONAL; INTRAMUSCULAR; INTRAVENOUS; SOFT TISSUE at 10:15

## 2022-08-25 RX ADMIN — FENTANYL CITRATE 50 MCG: 50 INJECTION, SOLUTION INTRAMUSCULAR; INTRAVENOUS at 11:43

## 2022-08-25 RX ADMIN — LISINOPRIL 40 MG: 20 TABLET ORAL at 17:25

## 2022-08-25 RX ADMIN — ATORVASTATIN CALCIUM 40 MG: 40 TABLET, FILM COATED ORAL at 17:23

## 2022-08-25 RX ADMIN — HEPARIN SODIUM 1000 UNITS: 1000 INJECTION, SOLUTION INTRAVENOUS; SUBCUTANEOUS at 11:55

## 2022-08-25 RX ADMIN — ETOMIDATE 6 MG: 2 INJECTION INTRAVENOUS at 10:11

## 2022-08-25 RX ADMIN — LEVETIRACETAM 250 MG: 250 TABLET, FILM COATED ORAL at 17:21

## 2022-08-25 RX ADMIN — TRAZODONE HYDROCHLORIDE 225 MG: 150 TABLET ORAL at 22:19

## 2022-08-25 RX ADMIN — CEFAZOLIN 2 G: 330 INJECTION, POWDER, FOR SOLUTION INTRAMUSCULAR; INTRAVENOUS at 10:47

## 2022-08-25 RX ADMIN — ROCURONIUM BROMIDE 50 MG: 10 INJECTION, SOLUTION INTRAVENOUS at 10:11

## 2022-08-25 RX ADMIN — IODIXANOL 120 ML: 270 INJECTION, SOLUTION INTRAVASCULAR at 13:45

## 2022-08-25 RX ADMIN — PROPOFOL 50 MG: 10 INJECTION, EMULSION INTRAVENOUS at 10:11

## 2022-08-25 RX ADMIN — EZETIMIBE 10 MG: 10 TABLET ORAL at 17:22

## 2022-08-25 RX ADMIN — Medication 5 MG: at 22:18

## 2022-08-25 RX ADMIN — MIDAZOLAM 2 MG: 1 INJECTION INTRAMUSCULAR; INTRAVENOUS at 10:00

## 2022-08-25 RX ADMIN — LABETALOL HYDROCHLORIDE 5 MG: 5 INJECTION, SOLUTION INTRAVENOUS at 13:13

## 2022-08-25 ASSESSMENT — PAIN DESCRIPTION - PAIN TYPE
TYPE: SURGICAL PAIN
TYPE: ACUTE PAIN
TYPE: SURGICAL PAIN
TYPE: ACUTE PAIN
TYPE: SURGICAL PAIN

## 2022-08-25 ASSESSMENT — ENCOUNTER SYMPTOMS
FOCAL WEAKNESS: 0
NERVOUS/ANXIOUS: 0
SHORTNESS OF BREATH: 0
PALPITATIONS: 0
VOMITING: 0
DOUBLE VISION: 0
HEADACHES: 1
BLURRED VISION: 0
DIZZINESS: 0
NAUSEA: 0
FEVER: 0
BACK PAIN: 0
TINGLING: 0
ABDOMINAL PAIN: 0
CHILLS: 0
INSOMNIA: 0
COUGH: 0

## 2022-08-25 NOTE — OR NURSING
Patient A+Ox4. Denies nausea.  States pain in back tolerable 3-4/10.  TABOR well. 5/5  and legs also strong.  To lay flat for 4hours post op.  Dr Arana at bedside to assess.  Patient given iv and po meds as ordered.  VSS.  Bilat groin angio sites clean and dry.  Left wrist Vibha in place,  PIV also in left forearm intact and infusing.

## 2022-08-25 NOTE — PROGRESS NOTES
Pt presents to IR 3. Pt was consented by MD at bedside, confirmed by this RN and consent at bedside. Anesthesia consent also verified by this RN. Anesthesia care provided by Dr. Rome. Pt transferred to IR table in supine position. Temp-sensing [place] placed per order, charlotte hugger applied. Moody placed by Dr. Rome. Patient underwent a Cerebral angiogram with possible intervention for dural AVF (right sigmoid sinus dural AVF) by Dr. Trujillo. Procedure site was marked by MD and verified using imaging guidance. Pt placed on monitor, prepped and draped in a sterile fashion. See anesthesia record for details. Report called to SHAY Willis. Pt transported by [facility] with RN to PACU.     1100 right fem art 6F; out at 1257  1157 left fem vein 6F; out at 1256       1102: 5,000 units heparin IVP  1158: 1,000 units heparin IVP    Right Dural AVF embolization  Arterial:   [employer] [place] [employer] Liquid embolic system  REF: [id number]   LOT: [id number]  Exp: 01/04/2025      [employer] [place] [employer] Liquid embolic system  REF: [id number]  LOT: [id number]  Exp: 01/20/2025      Venous:   [employer] Liquid embolic system  REF: [id number]   LOT: [id number]  Exp: 03/09/2025    Angioseal VIP 6F deployed at 1258 to Right femoral artery  REF: [id number]   LOT: [id number]  Exp: 07/15/2022

## 2022-08-25 NOTE — ANESTHESIA PROCEDURE NOTES
Arterial Line  Performed by: Francisco Rome M.D.  Authorized by: Francisco Rome M.D.     Start Time:  8/25/2022 10:25 AM  Localization: surface landmarks    Patient Location:  OR  Indication: continuous blood pressure monitoring        Catheter Size:  20 G  Seldinger Technique?: Yes    Laterality:  Left  Site:  Radial artery  Line Secured:  Antimicrobial disc, tape and transparent dressing  Events: patient tolerated procedure well with no complications

## 2022-08-25 NOTE — ANESTHESIA PROCEDURE NOTES
Airway    Date/Time: 8/25/2022 10:14 AM  Performed by: Francisco Rome M.D.  Authorized by: Francisco Rome M.D.     Location:  OR  Urgency:  Elective  Indications for Airway Management:  Anesthesia      Spontaneous Ventilation: absent    Sedation Level:  Deep  Preoxygenated: Yes    Patient Position:  Sniffing  Mask Difficulty Assessment:  2 - vent by mask + OA or adjuvant +/- NMBA  Final Airway Type:  Endotracheal airway  Final Endotracheal Airway:  ETT  Cuffed: Yes    Technique Used for Successful ETT Placement:  Direct laryngoscopy    Insertion Site:  Oral  Blade Type:  Glide  Laryngoscope Blade/Videolaryngoscope Blade Size:  3  ETT Size (mm):  7.0  Measured from:  Lips  ETT to Lips (cm):  20  Placement Verified by: auscultation and capnometry    Cormack-Lehane Classification:  Grade I - full view of glottis  Number of Attempts at Approach:  1   Atraumatic x1

## 2022-08-25 NOTE — ANESTHESIA TIME REPORT
Anesthesia Start and Stop Event Times     Date Time Event    8/25/2022 0954 Ready for Procedure     1000 Anesthesia Start     1332 Anesthesia Stop        Responsible Staff  08/25/22    Name Role Begin End    Francisco Rome M.D. Anesth 1000 1332        Overtime Reason:  no overtime (within assigned shift)    Comments:

## 2022-08-25 NOTE — OR SURGEON
Immediate Post- Operative Note        Findings: Right sigmoid sinus DAVF      Procedure(s): Endovascular repair of sigmoid DAVF      Estimated Blood Loss: Less than 5 ml        Complications: None            8/25/2022     1:06 PM     Ketan Trujillo M.D.

## 2022-08-25 NOTE — ANESTHESIA PREPROCEDURE EVALUATION
" Date/Time: 08/25/22 0930    Scheduled providers: Ketan Trujillo M.D.    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 Prep both groins, possibly rt jugular for both arterial and venous access.    Location: [facility] - INTER[facility]ION[facility] - [facility]        Anes H&P:  PAST MEDICAL HISTORY:   71 y.o. female who presents for Procedure(s):  IR3-Intracranial embolization-Dr. Trujillo-Anesthesia.  She has current and past medical problems significant for:    HYN  RAE  GERD    Patient denies history of seizures or strokes  Patient denies history of previous MI, chest pain or shortness of breath  Patient denies history of upper or lower extremity motor/sensory deficits   Patient denies history of bleeding disorders  Patient denies history of complications with anesthesia    Able to climb 2 flights of stair without dyspnea or angina, > 4 METs     Past Medical History:   Diagnosis Date   • Anxiety     medicated   • Arthritis     \"everywhere\"   • Common bile duct (CBD) stricture     hx of in 1990s   • Degenerative joint disease     bulging discs in C-spine, L-spine   • Delayed emergence from general anesthesia    • Depression    • Fibromyalgia    • Heart burn 2001   • High cholesterol     medicated   • History of mammogram     9/11, diagnostic showed breast cyst, rec 1 year   • Hyperlipidemia    • Hypertension     per pt, blood pressure \"fluctuates\"   • Hypothyroid    • Indigestion 2001   • Pap test, as part of routine gynecological examination     last 2008, no abn history   • Pneumonia 07/08/2022    hx of, reports nothing recent   • Post concussive syndrome     after fall in 1999   • PTSD (post-traumatic stress disorder)    • S/P colonoscopy     nl at age 52, due 2013   • Skin cancer     SCC   • Sleep apnea 08/24/2022    diagnosed " with RAE, waiting to hear about cpap etc   • Snoring     sleep study done   • Stroke (HCC)     2021 - no residual   • Urinary incontinence 2015       SMOKING/ALCOHOL/RECREATIONAL DRUG USE:  Social History     Tobacco Use   • Smoking status: Never   • Smokeless tobacco: Never   Vaping Use   • Vaping Use: Never used   Substance Use Topics   • Alcohol use: Not Currently     Comment: reports 2/month   • Drug use: Not Currently     Social History     Substance and Sexual Activity   Drug Use Not Currently       PAST SURGICAL HISTORY:  Past Surgical History:   Procedure Laterality Date   • ARTHROPLASTY, CMC JOINT, USING FLEXOR CARPI RADIALIS TENDON Right 04/19/2022    Procedure: RIGHT THUMB CARPOMETACARPAL ARTHROPLASTY    CPT 43942   45 MINS;  Surgeon: Aldo Dobson M.D.;  Location: [facility];  Service: Orthopedics   • HIP CANNULATED SCREW Right 04/17/2017    Procedure: HIP CANNULATED SCREW;  Surgeon: Dhaval Poole M.D.;  Location: [facility]ERY [facility];  Service:    • PRIMARY C SECTION  1980   • ESTEFANÍA BY LAPAROSCOPY     • EGD W/ENDOSCOPIC ULTRASOUND      1990s, common bile duct stricture, no stent   • OTHER NEUROLOGICAL SURG  cerebral angiogram       ALLERGIES:   Allergies   Allergen Reactions   • Ampicillin Diarrhea     Patient stated.   • Latex Hives and Rash     .       MEDICATIONS:  No current facility-administered medications on file prior to encounter.     Current Outpatient Medications on File Prior to Encounter   Medication Sig Dispense Refill   • amphetamine-dextroamphetamine (ADDERALL) 15 MG tablet Take 15 mg by mouth 2 times a day.     • busPIRone (BUSPAR) 15 MG tablet Take 15 mg by mouth 3 times a day. Indications: Anxiety Disorder     • HYDROcodone/acetaminophen (NORCO)  MG Tab Take 1 Tablet by mouth every 8 hours as needed for Severe Pain. Indications: Pain     • citalopram (CELEXA) 40 MG Tab Take 40 mg by mouth every morning. Indications: Depression, Generalized  Anxiety Disorder     • gabapentin (NEURONTIN) 300 MG Cap Take 300-600 mg by mouth 4 times a day. 300 mg 3 times daily, 600 mg at night  Indications: Fibromyalgia Syndrome     • levETIRAcetam (KEPPRA) 250 MG tablet Take 250 mg by mouth 2 times a day. Indications: Seizure     • traZODone (DESYREL) 150 MG Tab Take 225 mg by mouth every evening. Takes 1 and 1/2 tablets  Indications: Trouble Sleeping     • Melatonin 5 MG Cap Take 5 mg by mouth every evening. Indications: Trouble Sleeping     • MAGNESIUM PO Take 1 Tablet by mouth every evening. Indications: Pain     • acetaminophen (TYLENOL) 500 MG Tab Take 1,000 mg by mouth 2 times a day as needed for Moderate Pain.     • bisoprolol (ZEBETA) 10 MG tablet Take 10 mg by mouth at bedtime. Indications: High Blood Pressure Disorder     • atorvastatin (LIPITOR) 40 MG Tab TAKE 1 TABLET BY MOUTH ONCE DAILY (Patient taking differently: Take 40 mg by mouth every evening.) 100 Tab 2   • omeprazole (PRILOSEC) 20 MG delayed-release capsule Take 20 mg by mouth every morning. Indications: Gastroesophageal Reflux Disease         LABS:  Lab Results   Component Value Date/Time    HEMOGLOBIN 14.1 08/25/2022 0820    HEMATOCRIT 41.7 08/25/2022 0820    WBC 5.0 08/25/2022 0820     Lab Results   Component Value Date/Time    SODIUM 140 08/25/2022 0820    POTASSIUM 4.6 08/25/2022 0820    CHLORIDE 103 08/25/2022 0820    CO2 28 08/25/2022 0820    GLUCOSE 104 (H) 08/25/2022 0820    BUN 15 08/25/2022 0820    CALCIUM 9.4 08/25/2022 0820       SARS-CoV2 result: Negative      PREVIOUS ANESTHETICS: See EMR  __________________________________________    Relevant Problems   NEURO   (positive) Migraine without aura and without status migrainosus, not intractable      CARDIAC   (positive) Hypertension   (positive) Migraine without aura and without status migrainosus, not intractable      GI   (positive) GERD (gastroesophageal reflux disease)         (positive) Chronic kidney disease, stage 3 (HCC)       ENDO   (positive) Hypothyroidism      Other   (positive) Arthritis       Physical Exam    Airway   Mallampati: II  TM distance: >3 FB  Neck ROM: full       Cardiovascular - normal exam  Rhythm: regular  Rate: normal  (-) murmur     Dental - normal exam           Pulmonary - normal exam  Breath sounds clear to auscultation     Abdominal   (+) obese     Neurological - normal exam                 Anesthesia Plan    ASA 2       Plan - general       Airway plan will be ETT          Induction: intravenous    Postoperative Plan: Postoperative administration of opioids is intended.    Pertinent diagnostic labs and testing reviewed    Informed Consent:    Anesthetic plan and risks discussed with patient.    Use of blood products discussed with: patient whom consented to blood products.

## 2022-08-25 NOTE — ASSESSMENT & PLAN NOTE
-S/p endovascular repair today by Dr. Landeros  -Serial neurologic exams  -HOB >30  -Maintain normothermia, normal natremia and normoglycemia

## 2022-08-25 NOTE — CONSULTS
Critical Care Consultation    Date of consult: 8/25/2022    Referring Physician  Ketan Trujillo M.D.    Reason for Consultation  I was asked to consult on this patient for ICU admission s/p elective endovascular repair of sigmoid DAVF for serial neurologic exams and close hemodynamic monitoring.    History of Presenting Illness  Elizabeth Murphy is a 71-year-old female with past medical history significant for DLD, HTN, hypothyroidism, migraines, fibromyalgia, ERICKA and GERD who presented to [facility] on 8/25 for elective repair of known dural AV fistula.    Seen and examined in PACU.  She is A/O x4 with no focal deficits.  She complains of headache which she thinks is due to not having any caffeine today. Her right groin procedure site has pea-sized area of dried blood. Site sot without ecchymosis.     Code Status  Full Code    Review of Systems  Review of Systems   Constitutional:  Negative for chills and fever.   HENT:  Negative for hearing loss and tinnitus.    Eyes:  Negative for blurred vision and double vision.   Respiratory:  Negative for cough and shortness of breath.    Cardiovascular:  Negative for chest pain and palpitations.   Gastrointestinal:  Negative for abdominal pain, nausea and vomiting.   Genitourinary:  Negative for frequency, hematuria and urgency.   Musculoskeletal:  Negative for back pain and joint pain.   Neurological:  Positive for headaches. Negative for dizziness, tingling and focal weakness.   Psychiatric/Behavioral:  The patient is not nervous/anxious and does not have insomnia.    All other systems reviewed and are negative.    Past Medical History   has a past medical history of Anxiety, Arthritis, Common bile duct (CBD) stricture, Degenerative joint disease, Delayed emergence from general anesthesia, Depression, Fibromyalgia, Heart burn (2001), High cholesterol, History of mammogram, Hyperlipidemia, Hypertension, Hypothyroid, Indigestion (2001), Pap test, as part of routine  gynecological examination, Pneumonia (07/08/2022), Post concussive syndrome, PTSD (post-traumatic stress disorder), S/P colonoscopy, Skin cancer, Sleep apnea (08/24/2022), Snoring, Stroke (HCC), and Urinary incontinence (2015).    Surgical History   has a past surgical history that includes bronson by laparoscopy; egd w/endoscopic ultrasound; hip cannulated screw (Right, 04/17/2017); arthroplasty, cmc joint, using flexor carpi radialis tendon (Right, 04/19/2022); primary c section (1980); and other neurological surg (cerebral angiogram).    Family History  family history includes Cancer in her father and mother; Heart Disease in her maternal grandfather, maternal grandmother, and paternal grandfather; Hyperlipidemia in her father and mother; Hypertension in her father, maternal grandmother, and mother; No Known Problems in her brother and paternal grandmother; Stroke in her paternal grandfather.    Social History   reports that she has never smoked. She has never used smokeless tobacco. She reports that she does not currently use alcohol. She reports that she does not currently use drugs.    Medications  Home Medications       Reviewed by Charu Meza (Pharmacy Tech) on 08/25/22 at 0837  Med List Status: Complete     Medication Last Dose Status   acetaminophen (TYLENOL) 500 MG Tab 8/24/2022 Active   amphetamine-dextroamphetamine (ADDERALL) 15 MG tablet 8/24/2022 Active   atorvastatin (LIPITOR) 40 MG Tab 8/24/2022 Active   bisoprolol (ZEBETA) 10 MG tablet 8/24/2022 Active   busPIRone (BUSPAR) 15 MG tablet 8/24/2022 Active   citalopram (CELEXA) 40 MG Tab 8/25/2022 Active   ezetimibe (ZETIA) 10 MG Tab 8/24/2022 Active   gabapentin (NEURONTIN) 300 MG Cap 8/25/2022 Active   HYDROcodone/acetaminophen (NORCO)  MG Tab 8/25/2022 Active   levETIRAcetam (KEPPRA) 250 MG tablet 8/25/2022 Active   levothyroxine (SYNTHROID) 50 MCG Tab 8/24/2022 Active   lisinopril (PRINIVIL) 40 MG tablet 8/24/2022 Active   MAGNESIUM  PO 8/24/2022 Active   Melatonin 5 MG Cap 8/24/2022 Active   mupirocin (BACTROBAN) 2 % Ointment 8/25/2022 Active   omeprazole (PRILOSEC) 20 MG delayed-release capsule 8/25/2022 Active   traZODone (DESYREL) 150 MG Tab 8/24/2022 Active                  Current Facility-Administered Medications   Medication Dose Route Frequency Provider Last Rate Last Admin    lidocaine (XYLOCAINE) 1 % injection 0.5 mL  0.5 mL Intradermal Once PRN Ketan Trujillo M.D.        LIDOCAINE HCL 4 % MT SOLN             NITROGLYCERIN 2 MG IV SOLN             HYDROmorphone (Dilaudid) injection 0.1 mg  0.1 mg Intravenous Q5 MIN PRN Francisco Rome M.D.        Or    HYDROmorphone (Dilaudid) injection 0.2 mg  0.2 mg Intravenous Q5 MIN PRN Francisco Rome M.D.        Or    HYDROmorphone (Dilaudid) injection 0.4 mg  0.4 mg Intravenous Q5 MIN PRN Francisco Rome M.D.        ondansetron (ZOFRAN) syringe/vial injection 4 mg  4 mg Intravenous Once PRN Francisco Rome M.D.        diphenhydrAMINE (BENADRYL) injection 12.5 mg  12.5 mg Intravenous Q15 MIN PRN Francisco Rome M.D.        albuterol (PROVENTIL) 2.5mg/3ml nebulizer solution 2.5 mg  2.5 mg Inhalation Q10 MIN PRN Francisco Rome M.D.        lactated ringers infusion   Intravenous Continuous Francisco Rome M.D.        fentaNYL (SUBLIMAZE) injection 25 mcg  25 mcg Intravenous Q2 MIN PRN Francisoc Rome M.D.   25 mcg at 08/25/22 1411    Or    fentaNYL (SUBLIMAZE) injection 50 mcg  50 mcg Intravenous Q2 MIN PRN Francisco Rome M.D.   50 mcg at 08/25/22 1535    meperidine (DEMEROL) injection 6.25 mg  6.25 mg Intravenous Q5 MIN PRN Francisco Rome M.D.        haloperidol lactate (HALDOL) injection 1 mg  1 mg Intravenous Q15 MIN PRN Francisco Rome M.D.        hydrALAZINE (APRESOLINE) injection 5 mg  5 mg Intravenous Q5 MIN PRN Francisco Rome M.D.        labetalol (NORMODYNE/TRANDATE) injection 5 mg  5 mg Intravenous Q5 MIN PRN  Francisco Rome M.D.        ePHEDrine injection 5 mg  5 mg Intravenous Q5 MIN PRN Francisco Rome M.D.        senna-docusate (PERICOLACE or SENOKOT S) 8.6-50 MG per tablet 2 Tablet  2 Tablet Oral BID Stella Hopkinser, A.P.R.N.        And    polyethylene glycol/lytes (MIRALAX) PACKET 1 Packet  1 Packet Oral QDAY PRN Stella ANGULO Biggs, A.P.R.N.        And    magnesium hydroxide (MILK OF MAGNESIA) suspension 30 mL  30 mL Oral QDAY PRN Stella ANGULO Biggs, A.P.R.N.        And    bisacodyl (DULCOLAX) suppository 10 mg  10 mg Rectal QDAY PRN Stella ANGULO Biggs, A.P.R.N.        acetaminophen (Tylenol) tablet 650 mg  650 mg Oral Q6HRS PRN Stella ANGULO Biggs, A.P.R.N.        atorvastatin (LIPITOR) tablet 40 mg  40 mg Oral Q EVENING Stella  Biggs, A.P.R.N.        bisoprolol (ZEBETA) tablet 10 mg  10 mg Oral QHS Stella Hopkinser, A.P.R.N.        busPIRone (BUSPAR) tablet 15 mg  15 mg Oral TID Stella Hopkinser, A.P.R.N.        citalopram (CeleXA) tablet 40 mg  40 mg Oral QAM Stella  Biggs, A.P.R.N.        ezetimibe (ZETIA) tablet 10 mg  10 mg Oral Q EVENING Stella  Biggs, A.P.R.N.        HYDROcodone/acetaminophen (NORCO)  MG per tablet 1 Tablet  1 Tablet Oral Q8HRS PRN Stella Hopkinser, A.P.R.N.        levETIRAcetam (KEPPRA) tablet 250 mg  250 mg Oral BID Stella ANGULO Biggs, A.P.R.N.        levothyroxine (SYNTHROID) tablet 50 mcg  50 mcg Oral DAILY Stella Hopkinser, A.P.R.N.        lisinopril (PRINIVIL) tablet 40 mg  40 mg Oral DAILY Stella F Biggs, A.P.R.N.        gabapentin (NEURONTIN) capsule 300 mg  300 mg Oral TID Stella Biggs A.P.R.N.        gabapentin (NEURONTIN) capsule 600 mg  600 mg Oral QHS Stella Biggs A.P.R.N.        melatonin tablet 5 mg  5 mg Oral Nightly Stella Biggs A.P.R.N.        omeprazole (PRILOSEC) capsule 20 mg  20 mg Oral QAM Stella Biggs A.P.R.N.        traZODone (DESYREL) tablet 225 mg  225 mg Oral Nightly Stella Biggs A.P.R.N.        amphetamine-dextroamphetamine (ADDERALL) tablet 15 mg  15 mg  Oral BID TERRI Cano           Allergies  Allergies   Allergen Reactions    Ampicillin Diarrhea     Patient stated.    Latex Hives and Rash     .       Vital Signs last 24 hours  Temp:  [36.3 °C (97.4 °F)-36.6 °C (97.8 °F)] 36.3 °C (97.4 °F)  Pulse:  [58-75] 72  Resp:  [12-20] 18  BP: (159)/(74) 159/74  SpO2:  [93 %-98 %] 95 %    Physical Exam  Physical Exam  Vitals and nursing note reviewed.   Constitutional:       General: She is not in acute distress.     Appearance: Normal appearance. She is ill-appearing. She is not toxic-appearing.      Interventions: Nasal cannula in place.   HENT:      Head: Normocephalic.      Right Ear: Hearing normal.      Left Ear: Hearing normal.      Nose: Nose normal.      Mouth/Throat:      Lips: Pink.      Mouth: Mucous membranes are moist.   Eyes:      Pupils: Pupils are equal, round, and reactive to light.   Cardiovascular:      Rate and Rhythm: Normal rate and regular rhythm.      Pulses: Normal pulses.      Heart sounds: Normal heart sounds.      Comments: Right groin procedure site soft without evidence of hematoma.  Pulmonary:      Effort: Pulmonary effort is normal.      Breath sounds: Normal breath sounds. No wheezing or rhonchi.   Abdominal:      General: Bowel sounds are normal.      Palpations: Abdomen is soft.      Tenderness: There is no abdominal tenderness. There is no guarding or rebound.   Musculoskeletal:      Comments: TABOR 5/5   Skin:     General: Skin is warm and dry.      Capillary Refill: Capillary refill takes less than 2 seconds.   Neurological:      General: No focal deficit present.      Mental Status: She is alert and oriented to person, place, and time.      GCS: GCS eye subscore is 4. GCS verbal subscore is 5. GCS motor subscore is 6.      Cranial Nerves: Cranial nerves 2-12 are intact.      Sensory: Sensation is intact.      Motor: Motor function is intact.      Coordination: Coordination is intact.   Psychiatric:         Mood and Affect:  Mood normal.         Behavior: Behavior is cooperative.         Cognition and Memory: Cognition normal.         Judgment: Judgment normal.       Fluids    Intake/Output Summary (Last 24 hours) at 8/25/2022 1544  Last data filed at 8/25/2022 1332  Gross per 24 hour   Intake 1438.75 ml   Output 30 ml   Net 1408.75 ml       Laboratory  Recent Results (from the past 48 hour(s))   Prothrombin Time (INR) (plasma)    Collection Time: 08/25/22  8:20 AM   Result Value Ref Range    PT 12.1 12.0 - 14.6 sec    INR 0.90 0.87 - 1.13   Comp Metabolic Panel    Collection Time: 08/25/22  8:20 AM   Result Value Ref Range    Sodium 140 135 - 145 mmol/L    Potassium 4.6 3.6 - 5.5 mmol/L    Chloride 103 96 - 112 mmol/L    Co2 28 20 - 33 mmol/L    Anion Gap 9.0 7.0 - 16.0    Glucose 104 (H) 65 - 99 mg/dL    Bun 15 8 - 22 mg/dL    Creatinine 0.80 0.50 - 1.40 mg/dL    Calcium 9.4 8.5 - 10.5 mg/dL    AST(SGOT) 16 12 - 45 U/L    ALT(SGPT) 14 2 - 50 U/L    Alkaline Phosphatase 89 30 - 99 U/L    Total Bilirubin 0.2 0.1 - 1.5 mg/dL    Albumin 4.9 3.2 - 4.9 g/dL    Total Protein 7.3 6.0 - 8.2 g/dL    Globulin 2.4 1.9 - 3.5 g/dL    A-G Ratio 2.0 g/dL   CBC Without Differential    Collection Time: 08/25/22  8:20 AM   Result Value Ref Range    WBC 5.0 4.8 - 10.8 K/uL    RBC 4.48 4.20 - 5.40 M/uL    Hemoglobin 14.1 12.0 - 16.0 g/dL    Hematocrit 41.7 37.0 - 47.0 %    MCV 93.1 81.4 - 97.8 fL    MCH 31.5 27.0 - 33.0 pg    MCHC 33.8 33.6 - 35.0 g/dL    RDW 38.3 35.9 - 50.0 fL    Platelet Count 213 164 - 446 K/uL    MPV 9.4 9.0 - 12.9 fL   ESTIMATED GFR    Collection Time: 08/25/22  8:20 AM   Result Value Ref Range    GFR (CKD-EPI) 79 >60 mL/min/1.73 m 2       Imaging  IR-EMBOLIZE-NEURO-EXTRACRANIAL    (Results Pending)       Assessment/Plan  * DAVF (dural arteriovenous fistula)- (present on admission)  Assessment & Plan  -S/p endovascular repair today by Dr. Landeros  -Serial neurologic exams  -HOB >30  -Maintain normothermia, normal natremia and  normoglycemia    Anxiety- (present on admission)  Assessment & Plan  -Continue her home buspirone, citalopram and trazodone    Hypothyroidism- (present on admission)  Assessment & Plan  -Continue home levothyroxine    Chronic back pain- (present on admission)  Assessment & Plan  -Continue home gabapentin and hydrocodone  -PDMP reviewed - confirmed medications    Hypertension- (present on admission)  Assessment & Plan  -Continue home lisinopril and bisoprolol    Dyslipidemia- (present on admission)  Assessment & Plan  -Continue statin        Discussed patient condition and risk of morbidity and/or mortality with RN, Patient, and my attending Dr. Ferrer .    The patient remains critically ill.  Critical care time = 45 minutes in directly providing and coordinating critical care and extensive data review.  No time overlap and excludes procedures.    Please note that this dictation was created using voice recognition software. I have made every reasonable attempt to correct obvious errors, but there may be errors of grammar and possibly content that I did not discover before finalizing the note.    VICTOR MANUEL Cano.

## 2022-08-25 NOTE — ANESTHESIA POSTPROCEDURE EVALUATION
Patient: Elizabeth Murphy    Procedure Summary     Date: 08/25/22 Room / Location: [facility] IMAGING - INTERVENTIONAL - REGIONAL MEDICAL [facility]    Anesthesia Start: 1000 Anesthesia Stop: 1332    Procedure: IR-EMBOLIZE-NEURO-EXTRACRANIAL Diagnosis:       Dural arteriovenous fistula      Cerebral aneurysm, nonruptured      (Vascular malformation)      (SAVANNAH embolization of right dAVF. Prep both groins, possibly rt jugular for both arterial and venous access.)    Scheduled Providers: Ketan Trujillo M.D. Responsible Provider: Francisco Rome M.D.    Anesthesia Type: general ASA Status: 2          Final Anesthesia Type: general  Last vitals  BP   Blood Pressure : (!) 159/74, NIBP: 134/61    Temp   36.4 °C (97.5 °F)    Pulse   74   Resp   14    SpO2   95 %      Anesthesia Post Evaluation    Patient location during evaluation: PACU  Patient participation: complete - patient participated  Level of consciousness: awake and alert    Airway patency: patent  Anesthetic complications: no  Cardiovascular status: hemodynamically stable  Respiratory status: acceptable  Hydration status: euvolemic    PONV: none          No notable events documented.     Nurse Pain Score: 3 (NPRS)

## 2022-08-25 NOTE — OR NURSING
MAHENDRA Biggs NP at bedside to assess.  Ok to start clear liquids.  Updated on patient headache.  Meds given as ordered

## 2022-08-26 VITALS
SYSTOLIC BLOOD PRESSURE: 148 MMHG | DIASTOLIC BLOOD PRESSURE: 96 MMHG | HEIGHT: 64 IN | BODY MASS INDEX: 28.45 KG/M2 | WEIGHT: 166.67 LBS | OXYGEN SATURATION: 86 % | RESPIRATION RATE: 50 BRPM | HEART RATE: 69 BPM | TEMPERATURE: 97.4 F

## 2022-08-26 DIAGNOSIS — I77.0 AVF (ARTERIOVENOUS FISTULA) (HCC): ICD-10-CM

## 2022-08-26 LAB
ALBUMIN SERPL BCP-MCNC: 3.9 G/DL (ref 3.2–4.9)
ALBUMIN/GLOB SERPL: 2.2 G/DL
ALP SERPL-CCNC: 63 U/L (ref 30–99)
ALT SERPL-CCNC: 10 U/L (ref 2–50)
ANION GAP SERPL CALC-SCNC: 9 MMOL/L (ref 7–16)
AST SERPL-CCNC: 13 U/L (ref 12–45)
BILIRUB SERPL-MCNC: 0.2 MG/DL (ref 0.1–1.5)
BUN SERPL-MCNC: 10 MG/DL (ref 8–22)
CALCIUM SERPL-MCNC: 8.8 MG/DL (ref 8.5–10.5)
CHLORIDE SERPL-SCNC: 105 MMOL/L (ref 96–112)
CO2 SERPL-SCNC: 26 MMOL/L (ref 20–33)
CREAT SERPL-MCNC: 0.66 MG/DL (ref 0.5–1.4)
ERYTHROCYTE [DISTWIDTH] IN BLOOD BY AUTOMATED COUNT: 39.9 FL (ref 35.9–50)
GFR SERPLBLD CREATININE-BSD FMLA CKD-EPI: 94 ML/MIN/1.73 M 2
GLOBULIN SER CALC-MCNC: 1.8 G/DL (ref 1.9–3.5)
GLUCOSE SERPL-MCNC: 129 MG/DL (ref 65–99)
HCT VFR BLD AUTO: 33.7 % (ref 37–47)
HGB BLD-MCNC: 11.4 G/DL (ref 12–16)
MAGNESIUM SERPL-MCNC: 1.8 MG/DL (ref 1.5–2.5)
MCH RBC QN AUTO: 31.9 PG (ref 27–33)
MCHC RBC AUTO-ENTMCNC: 33.8 G/DL (ref 33.6–35)
MCV RBC AUTO: 94.4 FL (ref 81.4–97.8)
PLATELET # BLD AUTO: 173 K/UL (ref 164–446)
PMV BLD AUTO: 9.7 FL (ref 9–12.9)
POTASSIUM SERPL-SCNC: 4.6 MMOL/L (ref 3.6–5.5)
PROT SERPL-MCNC: 5.7 G/DL (ref 6–8.2)
RBC # BLD AUTO: 3.57 M/UL (ref 4.2–5.4)
SODIUM SERPL-SCNC: 140 MMOL/L (ref 135–145)
WBC # BLD AUTO: 7.7 K/UL (ref 4.8–10.8)

## 2022-08-26 PROCEDURE — 85027 COMPLETE CBC AUTOMATED: CPT

## 2022-08-26 PROCEDURE — 83735 ASSAY OF MAGNESIUM: CPT

## 2022-08-26 PROCEDURE — A9270 NON-COVERED ITEM OR SERVICE: HCPCS | Performed by: NURSE PRACTITIONER

## 2022-08-26 PROCEDURE — 99239 HOSP IP/OBS DSCHRG MGMT >30: CPT | Performed by: NURSE PRACTITIONER

## 2022-08-26 PROCEDURE — 700102 HCHG RX REV CODE 250 W/ 637 OVERRIDE(OP): Performed by: NURSE PRACTITIONER

## 2022-08-26 PROCEDURE — 80053 COMPREHEN METABOLIC PANEL: CPT

## 2022-08-26 RX ADMIN — LEVETIRACETAM 250 MG: 250 TABLET, FILM COATED ORAL at 05:42

## 2022-08-26 RX ADMIN — DEXTROAMPHETAMINE SACCHARATE, AMPHETAMINE ASPARTATE, DEXTROAMPHETAMINE SULFATE, AMPHETAMINE SULFATE TABLETS, 10 MG,CLL 15 MG: 2.5; 2.5; 2.5; 2.5 TABLET ORAL at 07:21

## 2022-08-26 RX ADMIN — LEVOTHYROXINE SODIUM 50 MCG: 0.05 TABLET ORAL at 05:43

## 2022-08-26 RX ADMIN — GABAPENTIN 300 MG: 300 CAPSULE ORAL at 05:42

## 2022-08-26 RX ADMIN — CITALOPRAM 40 MG: 40 TABLET, FILM COATED ORAL at 05:43

## 2022-08-26 RX ADMIN — BUSPIRONE HYDROCHLORIDE 15 MG: 10 TABLET ORAL at 05:43

## 2022-08-26 RX ADMIN — ACETAMINOPHEN 650 MG: 325 TABLET, FILM COATED ORAL at 06:36

## 2022-08-26 RX ADMIN — DOCUSATE SODIUM 50 MG AND SENNOSIDES 8.6 MG 2 TABLET: 8.6; 5 TABLET, FILM COATED ORAL at 05:42

## 2022-08-26 RX ADMIN — LISINOPRIL 40 MG: 20 TABLET ORAL at 05:43

## 2022-08-26 RX ADMIN — OMEPRAZOLE 20 MG: 20 CAPSULE, DELAYED RELEASE ORAL at 05:42

## 2022-08-26 ASSESSMENT — PAIN DESCRIPTION - PAIN TYPE
TYPE: ACUTE PAIN
TYPE: ACUTE PAIN

## 2022-08-26 NOTE — PROGRESS NOTES
S/p uncomplicated endovascular neurointervention on 8/25/22 by Ketan Trujillo MD (MIKY, x [phone]): SAVANNAH embolization of unruptured, symptomatic dural arteriovenous fistula. Admitted to ICU for post procedure neurologic monitoring and supportive care.     Today, the patient complains of headache last night that was resolved with coffee. Denies headache today. Pre procedure pulsatile tinnitus is not noted this am, however her right constant ringing tinnitus is still present. Has not been OOB  yet but denies feelings of weakness. Denies vision changes.   Awake, oriented x 4. Face symmetric, speech fluent. No drift. RIGHT arterial angio site WNL, no hematoma. LEFT venous angio site WNL, no hematoma. LE distal pulses 2+, no edema noted. Skin warm, cap refill <2 sec.     From a NeuroInterventional Service standpoint, OK to discharge to home when medically cleared by Hospitalist Service. Patient to follow up in our clinic in 2-6 weeks, our office to arrange appointment. Next imaging follow up will be a catheter angiogram in 6-12 months, sooner if symptoms return. No new medications needed.     For femoral arterial access:  Post-angioseal instructions: no lifting greater than 5 lbs and no baths/ swimming/ soaking in tub for 5 days. Then resume normal activity as tolerated. Shower OK. OK to change dressings to band aid as needed.

## 2022-08-26 NOTE — DISCHARGE INSTRUCTIONS
From a NeuroInterventional Service standpoint, OK to discharge to home when medically cleared by Hospitalist Service. Patient to follow up in our clinic in 2-6 weeks, our office to arrange appointment. Next imaging follow up will be a catheter angiogram in 6-12 months, sooner if symptoms return. No new medications needed.      For femoral arterial access:(Right Groin)   Post-angioseal instructions: no lifting greater than 5 lbs and no baths/ swimming/ soaking in tub for 5 days. Then resume normal activity as tolerated. Shower OK. OK to change dressings to band aid as needed.  If bleeding were to occur to right groin, signs and symptoms would be pain in the area and and a firm bulge, Lay flat, apply firm pressure and call 911.    Discharge Instructions    Discharged to home by car with relative. Discharged via wheelchair, hospital escort: Yes.  Special equipment needed: Not Applicable    Be sure to schedule a follow-up appointment with your primary care doctor or any specialists as instructed.     Discharge Plan:   Diet Plan: Discussed  Activity Level: Discussed  Confirmed Follow up Appointment: Patient to Call and Schedule Appointment  Confirmed Symptoms Management: Discussed  Medication Reconciliation Updated: Yes    I understand that a diet low in cholesterol, fat, and sodium is recommended for good health. Unless I have been given specific instructions below for another diet, I accept this instruction as my diet prescription.   Other diet: Heart Healthy - Low fat    Special Instructions: see ablove      -Is this patient being discharged with medication to prevent blood clots?  No    Is patient discharged on Warfarin / Coumadin?   No

## 2022-08-26 NOTE — DISCHARGE SUMMARY
Discharge Summary    CHIEF COMPLAINT ON ADMISSION  No chief complaint on file.      Reason for Admission  Cerebral aneurysm, nonruptured     Admission Date  8/25/2022    CODE STATUS  Full Code    HPI & HOSPITAL COURSE  Elizabeth Murphy is a 71-year-old female with past medical history significant for DLD, HTN, hypothyroidism, migraines, fibromyalgia, ERICKA and GERD who presented to [facility] on 8/25 for elective repair of known dural AV fistula.  Her procedure was without complications.  Her post procedure course was unremarkable.  Seen and examined this morning.  Her right groin site is soft without evidence of hematoma.  She is hemodynamically stable, has no focal neuro deficits and is ambulatory independently.      Therefore, she is discharged in good and stable condition to home with close outpatient follow-up.    The patient recovered much more quickly than anticipated on admission.    Discharge Date  08/26/22    FOLLOW UP ITEMS POST DISCHARGE  From a NeuroInterventional Service standpoint, OK to discharge to home when medically cleared by Hospitalist Service. Patient to follow up in our clinic in 2-6 weeks, our office to arrange appointment. Next imaging follow up will be a catheter angiogram in 6-12 months, sooner if symptoms return. No new medications needed.      For femoral arterial access:  Post-angioseal instructions: no lifting greater than 5 lbs and no baths/ swimming/ soaking in tub for 5 days. Then resume normal activity as tolerated. Shower OK. OK to change dressings to band aid as needed.    DISCHARGE DIAGNOSES  Principal Problem:    DAVF (dural arteriovenous fistula) POA: Yes  Active Problems:    Dyslipidemia (Chronic) POA: Yes    Hypertension (Chronic) POA: Yes    Chronic back pain POA: Yes    Hypothyroidism (Chronic) POA: Yes    Anxiety (Chronic) POA: Yes  Resolved Problems:    * No resolved hospital problems. *      FOLLOW UP  Yesenia Linares M.D.  [address] [facility]  [address]  [phone]    Call      MEDICATIONS ON DISCHARGE     Medication List       Instructions   atorvastatin 40 MG Tabs  What changed: when to take this  Commonly known as: LIPITOR   TAKE 1 TABLET BY MOUTH ONCE DAILY           Instructions   acetaminophen 500 MG Tabs  Commonly known as: TYLENOL   Take 1,000 mg by mouth 2 times a day as needed for Moderate Pain.  Dose: 1,000 mg     amphetamine-dextroamphetamine 15 MG tablet  Commonly known as: ADDERALL   Take 15 mg by mouth 2 times a day.  Dose: 15 mg     bisoprolol 10 MG tablet  Commonly known as: ZEBETA   Take 10 mg by mouth at bedtime. Indications: High Blood Pressure Disorder  Dose: 10 mg     busPIRone 15 MG tablet  Commonly known as: BUSPAR   Take 15 mg by mouth 3 times a day. Indications: Anxiety Disorder  Dose: 15 mg     citalopram 40 MG Tabs  Commonly known as: CeleXA   Take 40 mg by mouth every morning. Indications: Depression, Generalized Anxiety Disorder  Dose: 40 mg     ezetimibe 10 MG Tabs  Commonly known as: ZETIA   Take 10 mg by mouth every evening.  Dose: 10 mg     gabapentin 300 MG Caps  Commonly known as: NEURONTIN   Take 300-600 mg by mouth 4 times a day. 300 mg 3 times daily, 600 mg at night  Indications: Fibromyalgia Syndrome  Dose: 300-600 mg     HYDROcodone/acetaminophen  MG Tabs  Commonly known as: NORCO   Take 1 Tablet by mouth every 8 hours as needed for Severe Pain. Indications: Pain  Dose: 1 Tablet     levETIRAcetam 250 MG tablet  Commonly known as: KEPPRA   Take 250 mg by mouth 2 times a day. Indications: Seizure  Dose: 250 mg     levothyroxine 50 MCG Tabs  Commonly known as: SYNTHROID   Take 50 mcg by mouth every day.  Dose: 50 mcg     lisinopril 40 MG tablet  Commonly known as: PRINIVIL   Take 40 mg by mouth every day. FOR 90 DAYS  Dose: 40 mg     MAGNESIUM PO   Take 1 Tablet by mouth every evening. Indications: Pain  Dose: 1 Tablet     Melatonin 5 MG Caps   Take 5 mg by mouth every evening. Indications: Trouble  Sleeping  Dose: 5 mg     mupirocin 2 % Oint  Commonly known as: BACTROBAN   Apply 1 Application topically 3 times a day.  Dose: 1 Application     omeprazole 20 MG delayed-release capsule  Commonly known as: PRILOSEC   Take 20 mg by mouth every morning. Indications: Gastroesophageal Reflux Disease  Dose: 20 mg     traZODone 150 MG Tabs  Commonly known as: DESYREL   Take 225 mg by mouth every evening. Takes 1 and 1/2 tablets  Indications: Trouble Sleeping  Dose: 225 mg              Allergies  Allergies   Allergen Reactions    Ampicillin Diarrhea     Patient stated.    Latex Hives and Rash     .       DIET  Orders Placed This Encounter   Procedures    Diet Order Diet: Regular     Standing Status:   Standing     Number of Occurrences:   1     Order Specific Question:   Diet:     Answer:   Regular [1]       ACTIVITY  As tolerated.  Weight bearing as tolerated    CONSULTATIONS  Interventional Radiology  Critical Care    PROCEDURES  Endovascular repair of sigmoid DAVF    LABORATORY  Lab Results   Component Value Date    SODIUM 140 08/26/2022    POTASSIUM 4.6 08/26/2022    CHLORIDE 105 08/26/2022    CO2 26 08/26/2022    GLUCOSE 129 (H) 08/26/2022    BUN 10 08/26/2022    CREATININE 0.66 08/26/2022    CREATININE 0.8 02/27/2009        Lab Results   Component Value Date    WBC 7.7 08/26/2022    HEMOGLOBIN 11.4 (L) 08/26/2022    HEMATOCRIT 33.7 (L) 08/26/2022    PLATELETCT 173 08/26/2022        Total time of the discharge process exceeds 32 minutes.    Please note that this dictation was created using voice recognition software. I have made every reasonable attempt to correct obvious errors, but there may be errors of grammar and possibly content that I did not discover before finalizing the note.    VICTOR MANUEL Cano.

## 2022-08-31 RX ORDER — METHYLPREDNISOLONE 4 MG/1
TABLET ORAL
Qty: 21 TABLET | Refills: 0 | Status: SHIPPED | OUTPATIENT
Start: 2022-08-31

## 2022-09-21 ENCOUNTER — HOSPITAL ENCOUNTER (OUTPATIENT)
Dept: RADIOLOGY | Facility: MEDICAL CENTER | Age: 72
End: 2022-09-21
Attending: NURSE PRACTITIONER
Payer: MEDICARE

## 2022-09-21 DIAGNOSIS — I77.0 AVF (ARTERIOVENOUS FISTULA) (HCC): ICD-10-CM

## 2022-09-21 ASSESSMENT — LIFESTYLE VARIABLES: SUBSTANCE_ABUSE: 0

## 2022-09-21 ASSESSMENT — ENCOUNTER SYMPTOMS
HEADACHES: 1
SPEECH CHANGE: 0
CHILLS: 0
GASTROINTESTINAL NEGATIVE: 1
SEIZURES: 0
SENSORY CHANGE: 0
DIAPHORESIS: 0
WEIGHT LOSS: 0
CARDIOVASCULAR NEGATIVE: 1
FEVER: 0
FOCAL WEAKNESS: 0
WEAKNESS: 0
RESPIRATORY NEGATIVE: 1
CLAUDICATION: 0

## 2022-09-21 NOTE — PROGRESS NOTES
Neuro Interventional Service Consultation      Re: Elizabeth Murphy     MRN: [id number]   : 1950    Elizabeth Murphy was referred to our service by Shari Regalado MD. She is a 71 y.o. 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 She is also under the care of Yesenia Linares MD and Thomas Gonda MD.    History of Present Illness:   presented with altered level of consciousness from a right parenchymal hematoma in May 2021. There were no imaging findings for bleeding source at the time of her event. She was hospitalized and initially had left sided weakness, but that has resolved. There was suspicion for an occult vascular malformation and she ultimately underwent a catheter angiogram in 2022 that did not reveal any findings in the immediate proximity to the area in hemorrhage, however there was a right sigmoid sinus dAVF. She reports a prior posterior head trauma in  but no trauma immediately prior to her hemorrhage. The patient was subsequently referred to the Neuro Interventional Service for evaluation and management of this finding. She underwent uncomplicated embolization of the dAVF on  with Ketan Trujillo MD. She tolerated the procedure well and was discharged to home the next day. She contacted us with complaints of post-procedure headache that was relieved after a short course of steroids.     She is seen today for review of imaging studies and discussion of follow up after dural arteriovenous fistula repair. Today, she reports bilateral tinnitus with ringing quality but no pulsatile tinnitus any longer. She has hearing loss in her right ear that was present prior to the procedure.  She reports frequent headaches that she manages with caffeine. Her chief complaint to day is daytime fatigue and generalized but not focal feelings of weakness since the procedure. She is taking naps during the day. At the end of the day,  "she has a difficult time concentrating when she is tired. She states she is sleeping at night. She takes Adderall to help her concentration and denies any recent medication adjustments to this medication or to her thyroid supplement. She has an appointment with her neuropsychiatrist later this week. She denies problems with her bilateral femoral access sites and denies claudication symptoms in her right leg.t Blood pressure is reported to be controlled with medications. The patient has had anesthesia in the past and reports no problems or aftereffects. She lives in [place] and is retired. She is accompanied by a support person Binu (spouse) to today's consultation.    Past Medical History:   Diagnosis Date    Anxiety     medicated    Arthritis     \"everywhere\"    Common bile duct (CBD) stricture     hx of in 1990s    Degenerative joint disease     bulging discs in C-spine, L-spine    Delayed emergence from general anesthesia     Depression     Fibromyalgia     Heart burn 2001    High cholesterol     medicated    History of mammogram     9/11, diagnostic showed breast cyst, rec 1 year    Hyperlipidemia     Hypertension     per pt, blood pressure \"fluctuates\"    Hypothyroid     Indigestion 2001    Pap test, as part of routine gynecological examination     last 2008, no abn history    Pneumonia 07/08/2022    hx of, reports nothing recent    Post concussive syndrome     after fall in 1999    PTSD (post-traumatic stress disorder)     S/P colonoscopy     nl at age 52, due 2013    Skin cancer     SCC    Sleep apnea 08/24/2022    diagnosed with RAE, waiting to hear about cpap etc    Snoring     sleep study done    Stroke ([facility])     2021 - no residual    Urinary incontinence 2015     Past Surgical History:   Procedure Laterality Date    ARTHROPLASTY, [facility] JOINT, USING FLEXOR CARPI RADIALIS TENDON Right 04/19/2022    Procedure: RIGHT THUMB CARPOMETACARPAL ARTHROPLASTY    CPT 98084   45 MINS;  Surgeon: Aldo Dobson M.D.;  " Location: [facility];  Service: Orthopedics    HIP CANNULATED SCREW Right 04/17/2017    Procedure: HIP CANNULATED SCREW;  Surgeon: Dhaval Poole M.D.;  Location: SURGERY [facility];  Service:     PRIMARY C SECTION  1980    ESTEFANÍA BY LAPAROSCOPY      EGD W/ENDOSCOPIC ULTRASOUND      1990s, common bile duct stricture, no stent    OTHER NEUROLOGICAL SURG  cerebral angiogram     Social History     Socioeconomic History    Marital status:      Spouse name: Not on file    Number of children: Not on file    Years of education: Not on file    Highest education level: Not on file   Occupational History    Not on file   Tobacco Use    Smoking status: Never    Smokeless tobacco: Never   Vaping Use    Vaping Use: Never used   Substance and Sexual Activity    Alcohol use: Not Currently     Comment: reports 2/month    Drug use: Not Currently    Sexual activity: Yes     Partners: Male     Birth control/protection: None     Comment:    Other Topics Concern    Not on file   Social History Narrative    Not on file     Social Determinants of Health     Financial Resource Strain: Not on file   Food Insecurity: Not on file   Transportation Needs: Not on file   Physical Activity: Not on file   Stress: Not on file   Social Connections: Not on file   Intimate Partner Violence: Not on file   Housing Stability: Not on file     Family History   Problem Relation Age of Onset    Cancer Mother         BREAST    Hypertension Mother     Hyperlipidemia Mother     Hypertension Father     Hyperlipidemia Father     Cancer Father     No Known Problems Brother     Heart Disease Maternal Grandmother     Hypertension Maternal Grandmother     Heart Disease Maternal Grandfather     No Known Problems Paternal Grandmother     Heart Disease Paternal Grandfather     Stroke Paternal Grandfather        Review of Systems   Constitutional:  Positive for malaise/fatigue. Negative for chills, diaphoresis, fever and  weight loss.   HENT:  Positive for hearing loss (right) and tinnitus (negative for right pulsatile tinnitus; positive for bilateral ringing tinnitus).    Eyes:         Positive for visual acuity change   Respiratory: Negative.     Cardiovascular: Negative.  Negative for claudication.   Gastrointestinal: Negative.    Skin: Negative.    Neurological:  Positive for headaches. Negative for sensory change, speech change, focal weakness, seizures and weakness.   Psychiatric/Behavioral:  Negative for substance abuse.      A comprehensive 14-point review of systems was negative except as described above.     Labs:    Latest Reference Range & Units 8/26/22 04:54   WBC 4.8 - 10.8 K/uL 7.7   RBC 4.20 - 5.40 M/uL 3.57 (L)   Hemoglobin 12.0 - 16.0 g/dL 11.4 (L)   Hematocrit 37.0 - 47.0 % 33.7 (L)   MCV 81.4 - 97.8 fL 94.4   MCH 27.0 - 33.0 pg 31.9   MCHC 33.6 - 35.0 g/dL 33.8   RDW 35.9 - 50.0 fL 39.9   Platelet Count 164 - 446 K/uL 173   MPV 9.0 - 12.9 fL 9.7      Latest Reference Range & Units 8/26/22 04:54   Sodium 135 - 145 mmol/L 140   Potassium 3.6 - 5.5 mmol/L 4.6   Chloride 96 - 112 mmol/L 105   Co2 20 - 33 mmol/L 26   Anion Gap 7.0 - 16.0  9.0   Glucose 65 - 99 mg/dL 129 (H)   Bun 8 - 22 mg/dL 10   Creatinine 0.50 - 1.40 mg/dL 0.66   GFR (CKD-EPI) >60 mL/min/1.73 m 2 94   Calcium 8.5 - 10.5 mg/dL 8.8   AST(SGOT) 12 - 45 U/L 13   ALT(SGPT) 2 - 50 U/L 10   Alkaline Phosphatase 30 - 99 U/L 63   Total Bilirubin 0.1 - 1.5 mg/dL 0.2   Albumin 3.2 - 4.9 g/dL 3.9   Total Protein 6.0 - 8.2 g/dL 5.7 (L)   Globulin 1.9 - 3.5 g/dL 1.8 (L)   A-G Ratio g/dL 2.2   Magnesium 1.5 - 2.5 mg/dL 1.8      Latest Reference Range & Units 8/25/22 08:20   PT 12.0 - 14.6 sec 12.1   INR 0.87 - 1.13  0.90       Radiology:   Neurointerventional Radiology procedure 8/25/22 at [facility] ([facility]):  1.  Right sigmoid dural AV fistula.  2.  Successful endovascular repair with terry embolization-arterial and venous approach.      Neurointerventional  Radiology Procedure cerebral angiogram 7/11/22 at [facility]:  1.  Right sigmoid sinus dural AV fistula.  2.  Neuro interventional outpatient clinic visit was recommended to discuss the possibility of dural AV fistula repair.  3.  There is no evidence of vascular nidus in the right frontal lobe.      MRI brain w/ and w/o 6/16/21 [facility]:  Subacute parenchymal hemorrhage in the right frontal lobe shows interval   decrease in size since prior MRI and near complete resolution of surrounding   edema.     There is smooth pachymeningeal dural thickening and enhancement in the right   which is likely reactive.  Thin superimposed subacute subdural blood along   along the right temporal convexity    CT Head w/o 5/17/21 at [facility]:  1. There is an area of acute/subacute lobular hemorrhage within the right   frontal lobe measuring 4.6 cm x 2.2 cm x 3.8 cm ( Vol:19.2 cc ) which   appears to be overall stable in size considering differences in technique.   There is adjacent mass effect and edema and adjacent subarachnoid   hemorrhage.  There is right side subdural hemorrhage measuring 0.4 cm   without change.  There is small amount of hemorrhage contiguous with the   interhemispheric fissure, primarily anteriorly without change.  There is   associated compression of the body of the right lateral ventricle with   midline shift from right-to-left measuring 0.35 cm.  These findings are   stable.  Recommend additional close follow-up to assess ability including   MRI for additional evaluation.     2.  Please see above.     MRI brain w/o 5/12/21 at [facility]:  IMPRESSION: Subcortical hemorrhage in the right frontal region appears stable.   Underlying mass or AVM are a consideration.  No evidence of amyloid   angiopathy.    CTA head 5/12/21 at [facility]:  No evidence of active extravasation at the site of the right frontal lobe   parenchymal hematoma.     No intracranial arterial occlusion or significant stenosis.     Stable right  frontotemporal subdural hematoma.     CT head w/o 5/12/21 at [facility]:  There is a 4.1 cm right frontal lobe acute parenchymal hemorrhage with   surrounding edema.  An underlying infarct or mass is not excluded.  MRI   of the brain with and without intravenous contrast may be considered for   further evaluation.  4 mm right cervical convexity subdural hematoma.   Small amount of acute right frontal subarachnoid hemorrhage.  Mild right   to left midline shift measures 3 mm.          Current Outpatient Medications   Medication Sig Dispense Refill    methylPREDNISolone (MEDROL DOSEPAK) 4 MG Tablet Therapy Pack Follow schedule on package instructions. 21 Tablet 0    lisinopril (PRINIVIL) 40 MG tablet Take 40 mg by mouth every day. FOR 90 DAYS      ezetimibe (ZETIA) 10 MG Tab Take 10 mg by mouth every evening.      mupirocin (BACTROBAN) 2 % Ointment Apply 1 Application topically 3 times a day.      levothyroxine (SYNTHROID) 50 MCG Tab Take 50 mcg by mouth every day.      amphetamine-dextroamphetamine (ADDERALL) 15 MG tablet Take 15 mg by mouth 2 times a day.      busPIRone (BUSPAR) 15 MG tablet Take 15 mg by mouth 3 times a day. Indications: Anxiety Disorder      HYDROcodone/acetaminophen (NORCO)  MG Tab Take 1 Tablet by mouth every 8 hours as needed for Severe Pain. Indications: Pain      citalopram (CELEXA) 40 MG Tab Take 40 mg by mouth every morning. Indications: Depression, Generalized Anxiety Disorder      gabapentin (NEURONTIN) 300 MG Cap Take 300-600 mg by mouth 4 times a day. 300 mg 3 times daily, 600 mg at night  Indications: Fibromyalgia Syndrome      levETIRAcetam (KEPPRA) 250 MG tablet Take 250 mg by mouth 2 times a day. Indications: Seizure      traZODone (DESYREL) 150 MG Tab Take 225 mg by mouth every evening. Takes 1 and 1/2 tablets  Indications: Trouble Sleeping      Melatonin 5 MG Cap Take 5 mg by mouth every evening. Indications: Trouble Sleeping      MAGNESIUM PO Take 1 Tablet by mouth every  evening. Indications: Pain      acetaminophen (TYLENOL) 500 MG Tab Take 1,000 mg by mouth 2 times a day as needed for Moderate Pain.      bisoprolol (ZEBETA) 10 MG tablet Take 10 mg by mouth at bedtime. Indications: High Blood Pressure Disorder      atorvastatin (LIPITOR) 40 MG Tab TAKE 1 TABLET BY MOUTH ONCE DAILY 100 Tab 2    omeprazole (PRILOSEC) 20 MG delayed-release capsule Take 20 mg by mouth every morning. Indications: Gastroesophageal Reflux Disease       No current facility-administered medications for this encounter.       Allergies   Allergen Reactions    Ampicillin Diarrhea     Patient stated.    Latex Hives and Rash     .       Physical Exam  Constitutional:       General: She is not in acute distress.     Appearance: She is not diaphoretic.   HENT:      Head: Normocephalic.   Eyes:      General: No scleral icterus.  Pulmonary:      Effort: Pulmonary effort is normal. No respiratory distress.   Abdominal:      General: There is no distension.   Skin:     General: Skin is warm and dry.      Coloration: Skin is not jaundiced or pale.      Findings: No erythema or rash.   Neurological:      General: No focal deficit present.      Mental Status: She is alert and oriented to person, place, and time. She is not disoriented.      Cranial Nerves: No cranial nerve deficit or facial asymmetry.      Sensory: Sensation is intact.      Motor: No weakness or tremor.      Coordination: Coordination normal.      Gait: Gait is intact. Gait normal.   Psychiatric:         Mood and Affect: Mood and affect normal.         Behavior: Behavior normal.         Thought Content: Thought content normal.         Cognition and Memory: Memory normal.         Judgment: Judgment normal.     Impression:   1. Unruptured right sigmoid sinus dural arteriovenous fistula, status post to endovascular repair.  2. Right parenchymal hemorrhage.  3. History of traumatic brain injury posterior skull.  4. Right pulsatile tinnitus, resolved.  5.  Hypertension.  6. Hyperlipidemia.    Plan:    has recovered well from her procedure. The described generalized fatigue is unlikely related to the fistula repair as there are no focal symptoms. We cannot definitively determine if slow venous outflow from the fistula caused the hemorrhage in 2021, but it is possible. It was likely contributing to her right pulsatile tinnitus which has now resolved. We discussed the procedure at length and procedure screen shots were provided to the patient. We discussed the possibility of recurrence and that she will need at least one follow up catheter angiogram. [facility] is the only [facility] that performs endovascular neurointervention and cerebral angiography and the procedure must be done at this facility. We will contact her in February 2023 to arrange the follow up angiogram. In the interim, we discussed signs of recurrence that would include return of the pulsatile tinnitus and asked that she contact us. She will follow up with her other treating physicians regarding her daytime fatigue.       LARISSA Gregory with Ketan Trujillo MD  Neuro Interventional Service   [facility]   [id number]  [place] [address]  [phone]

## 2023-03-02 ENCOUNTER — HOSPITAL ENCOUNTER (OUTPATIENT)
Facility: MEDICAL CENTER | Age: 73
End: 2023-03-02
Attending: RADIOLOGY | Admitting: RADIOLOGY
Payer: MEDICARE

## 2023-03-02 ENCOUNTER — APPOINTMENT (OUTPATIENT)
Dept: RADIOLOGY | Facility: MEDICAL CENTER | Age: 73
End: 2023-03-02
Attending: RADIOLOGY
Payer: MEDICARE

## 2023-03-02 VITALS
WEIGHT: 166.89 LBS | HEART RATE: 68 BPM | SYSTOLIC BLOOD PRESSURE: 157 MMHG | DIASTOLIC BLOOD PRESSURE: 68 MMHG | OXYGEN SATURATION: 94 % | BODY MASS INDEX: 27.81 KG/M2 | HEIGHT: 65 IN | TEMPERATURE: 97.1 F | RESPIRATION RATE: 18 BRPM

## 2023-03-02 DIAGNOSIS — I67.1 DAVF (DURAL ARTERIOVENOUS FISTULA): ICD-10-CM

## 2023-03-02 LAB
CREAT SERPL-MCNC: 0.91 MG/DL (ref 0.5–1.4)
EKG IMPRESSION: NORMAL
ERYTHROCYTE [DISTWIDTH] IN BLOOD BY AUTOMATED COUNT: 46 FL (ref 35.9–50)
GFR SERPLBLD CREATININE-BSD FMLA CKD-EPI: 68 ML/MIN/1.73 M 2
HCT VFR BLD AUTO: 41.6 % (ref 37–47)
HGB BLD-MCNC: 13.8 G/DL (ref 12–16)
INR PPP: 0.97 (ref 0.87–1.13)
MCH RBC QN AUTO: 32.8 PG (ref 27–33)
MCHC RBC AUTO-ENTMCNC: 33.2 G/DL (ref 33.6–35)
MCV RBC AUTO: 98.8 FL (ref 81.4–97.8)
PLATELET # BLD AUTO: 149 K/UL (ref 164–446)
PMV BLD AUTO: 9.4 FL (ref 9–12.9)
PROTHROMBIN TIME: 12.8 SEC (ref 12–14.6)
RBC # BLD AUTO: 4.21 M/UL (ref 4.2–5.4)
WBC # BLD AUTO: 4.5 K/UL (ref 4.8–10.8)

## 2023-03-02 PROCEDURE — 160036 HCHG PACU - EA ADDL 30 MINS PHASE I

## 2023-03-02 PROCEDURE — 700102 HCHG RX REV CODE 250 W/ 637 OVERRIDE(OP): Performed by: RADIOLOGY

## 2023-03-02 PROCEDURE — 700111 HCHG RX REV CODE 636 W/ 250 OVERRIDE (IP)

## 2023-03-02 PROCEDURE — 93005 ELECTROCARDIOGRAM TRACING: CPT | Performed by: RADIOLOGY

## 2023-03-02 PROCEDURE — 99153 MOD SED SAME PHYS/QHP EA: CPT

## 2023-03-02 PROCEDURE — 85610 PROTHROMBIN TIME: CPT

## 2023-03-02 PROCEDURE — A9270 NON-COVERED ITEM OR SERVICE: HCPCS | Performed by: RADIOLOGY

## 2023-03-02 PROCEDURE — 700105 HCHG RX REV CODE 258: Performed by: RADIOLOGY

## 2023-03-02 PROCEDURE — 700117 HCHG RX CONTRAST REV CODE 255: Performed by: RADIOLOGY

## 2023-03-02 PROCEDURE — 160002 HCHG RECOVERY MINUTES (STAT)

## 2023-03-02 PROCEDURE — 85027 COMPLETE CBC AUTOMATED: CPT

## 2023-03-02 PROCEDURE — 160035 HCHG PACU - 1ST 60 MINS PHASE I

## 2023-03-02 PROCEDURE — 93010 ELECTROCARDIOGRAM REPORT: CPT | Performed by: INTERNAL MEDICINE

## 2023-03-02 PROCEDURE — 700111 HCHG RX REV CODE 636 W/ 250 OVERRIDE (IP): Performed by: RADIOLOGY

## 2023-03-02 PROCEDURE — 76937 US GUIDE VASCULAR ACCESS: CPT

## 2023-03-02 PROCEDURE — 36226 PLACE CATH VERTEBRAL ART: CPT

## 2023-03-02 PROCEDURE — 160046 HCHG PACU - 1ST 60 MINS PHASE II

## 2023-03-02 PROCEDURE — 36415 COLL VENOUS BLD VENIPUNCTURE: CPT

## 2023-03-02 PROCEDURE — 82565 ASSAY OF CREATININE: CPT

## 2023-03-02 RX ORDER — SODIUM CHLORIDE 9 MG/ML
500 INJECTION, SOLUTION INTRAVENOUS
Status: DISCONTINUED | OUTPATIENT
Start: 2023-03-02 | End: 2023-03-02 | Stop reason: HOSPADM

## 2023-03-02 RX ORDER — OXYCODONE HYDROCHLORIDE 5 MG/1
5 TABLET ORAL
Status: DISCONTINUED | OUTPATIENT
Start: 2023-03-02 | End: 2023-03-02 | Stop reason: HOSPADM

## 2023-03-02 RX ORDER — ONDANSETRON 2 MG/ML
4 INJECTION INTRAMUSCULAR; INTRAVENOUS PRN
Status: DISCONTINUED | OUTPATIENT
Start: 2023-03-02 | End: 2023-03-02 | Stop reason: HOSPADM

## 2023-03-02 RX ORDER — ACETAMINOPHEN 325 MG/1
650 TABLET ORAL ONCE
Status: COMPLETED | OUTPATIENT
Start: 2023-03-02 | End: 2023-03-02

## 2023-03-02 RX ORDER — MIDAZOLAM HYDROCHLORIDE 1 MG/ML
INJECTION INTRAMUSCULAR; INTRAVENOUS
Status: COMPLETED
Start: 2023-03-02 | End: 2023-03-02

## 2023-03-02 RX ORDER — MIDAZOLAM HYDROCHLORIDE 1 MG/ML
.5-2 INJECTION INTRAMUSCULAR; INTRAVENOUS PRN
Status: DISCONTINUED | OUTPATIENT
Start: 2023-03-02 | End: 2023-03-02 | Stop reason: HOSPADM

## 2023-03-02 RX ORDER — OXYCODONE HYDROCHLORIDE 5 MG/1
2.5 TABLET ORAL
Status: DISCONTINUED | OUTPATIENT
Start: 2023-03-02 | End: 2023-03-02 | Stop reason: HOSPADM

## 2023-03-02 RX ORDER — SODIUM CHLORIDE 9 MG/ML
1000 INJECTION, SOLUTION INTRAVENOUS
Status: COMPLETED | OUTPATIENT
Start: 2023-03-02 | End: 2023-03-02

## 2023-03-02 RX ORDER — HYDROMORPHONE HYDROCHLORIDE 1 MG/ML
0.25 INJECTION, SOLUTION INTRAMUSCULAR; INTRAVENOUS; SUBCUTANEOUS
Status: DISCONTINUED | OUTPATIENT
Start: 2023-03-02 | End: 2023-03-02 | Stop reason: HOSPADM

## 2023-03-02 RX ORDER — LIDOCAINE HYDROCHLORIDE 10 MG/ML
INJECTION, SOLUTION EPIDURAL; INFILTRATION; INTRACAUDAL; PERINEURAL
Status: COMPLETED
Start: 2023-03-02 | End: 2023-03-02

## 2023-03-02 RX ADMIN — IOHEXOL 65 ML: 300 INJECTION, SOLUTION INTRAVENOUS at 12:30

## 2023-03-02 RX ADMIN — FENTANYL CITRATE 25 MCG: 50 INJECTION, SOLUTION INTRAMUSCULAR; INTRAVENOUS at 11:28

## 2023-03-02 RX ADMIN — LIDOCAINE HYDROCHLORIDE: 10 INJECTION, SOLUTION EPIDURAL; INFILTRATION; INTRACAUDAL; PERINEURAL at 11:28

## 2023-03-02 RX ADMIN — FENTANYL CITRATE 25 MCG: 50 INJECTION, SOLUTION INTRAMUSCULAR; INTRAVENOUS at 11:41

## 2023-03-02 RX ADMIN — MIDAZOLAM HYDROCHLORIDE 1 MG: 1 INJECTION, SOLUTION INTRAMUSCULAR; INTRAVENOUS at 11:49

## 2023-03-02 RX ADMIN — MIDAZOLAM HYDROCHLORIDE 1 MG: 1 INJECTION, SOLUTION INTRAMUSCULAR; INTRAVENOUS at 11:28

## 2023-03-02 RX ADMIN — SODIUM CHLORIDE 1000 ML: 9 INJECTION, SOLUTION INTRAVENOUS at 10:15

## 2023-03-02 RX ADMIN — FENTANYL CITRATE 25 MCG: 50 INJECTION, SOLUTION INTRAMUSCULAR; INTRAVENOUS at 11:59

## 2023-03-02 RX ADMIN — ACETAMINOPHEN 650 MG: 325 TABLET, FILM COATED ORAL at 13:55

## 2023-03-02 ASSESSMENT — PAIN DESCRIPTION - PAIN TYPE
TYPE: ACUTE PAIN
TYPE: CHRONIC PAIN

## 2023-03-02 ASSESSMENT — FIBROSIS 4 INDEX: FIB4 SCORE: 1.66

## 2023-03-02 NOTE — OR SURGEON
Immediate Post- Operative Note        Findings: S/P endovascular repair of right sigmoid sinus fistula       Procedure(s): Diagnostic cerebral angiogram      Estimated Blood Loss: Less than 5 ml        Complications: None            3/2/2023     12:03 PM     Ketan Trujillo M.D.

## 2023-03-02 NOTE — H&P
"History and Physical    Date: 3/2/2023    PCP: Yesenia Linares M.D.        HPI: This is a 72 y.o. 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     Past Medical History:   Diagnosis Date    Anxiety     medicated    Arthritis     \"everywhere\"    Common bile duct (CBD) stricture     hx of in 1990s    Degenerative joint disease     bulging discs in C-spine, L-spine    Delayed emergence from general anesthesia     Depression     Fibromyalgia     Heart burn 2001    High cholesterol     medicated    History of mammogram     9/11, diagnostic showed breast cyst, rec 1 year    Hyperlipidemia     Hypertension     per pt, blood pressure \"fluctuates\"    Hypothyroid     Indigestion 2001    Pap test, as part of routine gynecological examination     last 2008, no abn history    Pneumonia 07/08/2022    hx of, reports nothing recent    Post concussive syndrome     after fall in 1999    PTSD (post-traumatic stress disorder)     S/P colonoscopy     nl at age 52, due 2013    Skin cancer     SCC    Sleep apnea 08/24/2022    diagnosed with RAE, waiting to hear about cpap etc    Snoring     sleep study done    Stroke ([facility])     2021 - no residual    Urinary incontinence 2015       Past Surgical History:   Procedure Laterality Date    ARTHROPLASTY, [facility] JOINT, USING FLEXOR CARPI RADIALIS TENDON Right 04/19/2022    Procedure: RIGHT THUMB CARPOMETACARPAL ARTHROPLASTY    CPT 41596   45 MINS;  Surgeon: Aldo Dobson M.D.;  Location: [facility];  Service: Orthopedics    HIP CANNULATED SCREW Right 04/17/2017    Procedure: HIP CANNULATED SCREW;  Surgeon: Dhaval Pooel M.D.;  Location: SURGERY [facility];  Service:     PRIMARY C SECTION  1980    ESTEFANÍA BY LAPAROSCOPY      EGD W/ENDOSCOPIC ULTRASOUND      1990s, common bile duct stricture, no stent    OTHER NEUROLOGICAL SURG  cerebral angiogram       Current Facility-Administered Medications   Medication Dose Route Frequency Provider Last " Rate Last Admin    NS infusion 1,000 mL  1,000 mL Intravenous Pre-Op Once Ketan Trujillo M.D.        LIDOCAINE HCL (PF) 1 % INJ SOLN                 Social History     Socioeconomic History    Marital status:      Spouse name: Not on file    Number of children: Not on file    Years of education: Not on file    Highest education level: Not on file   Occupational History    Not on file   Tobacco Use    Smoking status: Never    Smokeless tobacco: Never   Vaping Use    Vaping Use: Never used   Substance and Sexual Activity    Alcohol use: Not Currently     Comment: reports 2/month    Drug use: Not Currently    Sexual activity: Yes     Partners: Male     Birth control/protection: None     Comment:    Other Topics Concern    Not on file   Social History Narrative    Not on file     Social Determinants of Health     Financial Resource Strain: Not on file   Food Insecurity: Not on file   Transportation Needs: Not on file   Physical Activity: Not on file   Stress: Not on file   Social Connections: Not on file   Intimate Partner Violence: Not on file   Housing Stability: Not on file       Family History   Problem Relation Age of Onset    Cancer Mother         BREAST    Hypertension Mother     Hyperlipidemia Mother     Hypertension Father     Hyperlipidemia Father     Cancer Father     No Known Problems Brother     Heart Disease Maternal Grandmother     Hypertension Maternal Grandmother     Heart Disease Maternal Grandfather     No Known Problems Paternal Grandmother     Heart Disease Paternal Grandfather     Stroke Paternal Grandfather        Allergies:  Ampicillin and Latex    Review of Systems:  Sleep apnea    Physical Exam  Constitutional:       General: She is not in acute distress.     Appearance: She is well-developed. She is not diaphoretic.   Eyes:      General: No scleral icterus.  Pulmonary:      Effort: Pulmonary effort is normal. No respiratory distress.   Abdominal:      General: There is no  distension.      Palpations: Abdomen is soft.   Skin:     General: Skin is warm and dry.      Coloration: Skin is not pale.      Findings: No erythema or rash.   Neurological:      Mental Status: She is alert and oriented to person, place, and time.      Cranial Nerves: No cranial nerve deficit.      Coordination: Coordination normal.   Psychiatric:         Behavior: Behavior normal.         Thought Content: Thought content normal.         Judgment: Judgment normal.       Vital Signs  Blood Pressure : (!) 148/66   Temperature: 36.2 °C (97.2 °F)   Pulse: (!) 54   Respiration: 18   Pulse Oximetry: 96 %        Labs:  Recent Labs     03/02/23  0933   WBC 4.5*   RBC 4.21   HEMOGLOBIN 13.8   HEMATOCRIT 41.6   MCV 98.8*   MCH 32.8   MCHC 33.2*   RDW 46.0   PLATELETCT 149*   MPV 9.4     Recent Labs     03/02/23  0903   CREATININE 0.91     Recent Labs     03/02/23  0903   INR 0.97     Recent Labs     03/02/23  0903   INR 0.97       Radiology:  IR-CAROTID-CEREBRAL BILATERAL    (Results Pending)             Assessment and Plan:This is a 72 y.o. who s/p repair of right sigmoid DVF    Plan:Angiogram with possible repair

## 2023-03-02 NOTE — PROGRESS NOTES
Patient underwent a Cerebral Angiogram by Dr. Trujillo. Time out performed at 1129. Procedure site marked by MD and verified using imaging guidance. Pt placed in supine position for procedure, pressure points checked, padded, and monitored appropriately. Vitals taken every 5 minutes and remained stable during procedure (see doc flowsheet). CO2 waveform capnography utilized for patient monitoring and remained WNL throughout procedure. A gauze and tegaderm dressing placed over surgical site, CDI. Report called to PACU, RN. Pt transported via gurney with RN on monitor to PACU.     Hemostasis Achieved in Right femoral artery access site via use of Angio-Seal: deployed at 1200  Angio-Seal VIP Vascular Closure Device    REF: [id number]  LOT: [id number]  EXP: 10/31/2023

## 2023-03-02 NOTE — DISCHARGE INSTRUCTIONS
HOME CARE INSTRUCTIONS    ACTIVITY: Rest and take it easy for the first 24 hours.  A responsible adult is recommended to remain with you during that time.  It is normal to feel sleepy.  We encourage you to not do anything that requires balance, judgment or coordination.    FOR 24 HOURS DO NOT:  Drive, operate machinery or run household appliances.  Drink beer or alcoholic beverages.  Make important decisions or sign legal documents.    SPECIAL INSTRUCTIONS:     Cerebral Angiogram, Care After  This sheet gives you information about how to care for yourself after your procedure. Your health care provider may also give you more specific instructions. If you have problems or questions, contact your health care provider.  What can I expect after the procedure?  After your procedure, it is common to have:  Bruising and tenderness at the catheter insertion site.  A mild headache.  Follow these instructions at home:  Insertion site care  Follow instructions from your health care provider about how to take care of the insertion site. Make sure you:  Wash your hands with soap and water before you change your bandage (dressing). If soap and water are not available, use hand .  Change your dressing as told by your health care provider.  Leave stitches (sutures), skin glue, or adhesive strips in place. These skin closures may need to stay in place for 2 weeks or longer. If adhesive strip edges start to loosen and curl up, you may trim the loose edges. Do not remove adhesive strips completely unless your health care provider tells you to do that.  Do not apply powder or lotion to the site.  Do not take baths, swim, or use a hot tub until your health care provider says it is okay to do so.  You may shower 24-48 hours after the procedure or as told by your health care provider.  Gently wash the site with plain soap and water.  Pat the area dry with a clean towel.  Do not rub the site. This may cause bleeding.  Check your  incision area every day for signs of infection. Check for:  Redness, swelling, or pain.  Fluid or blood.  Warmth.  Pus or a bad smell.  Activity  Rest as told by your health care provider.  Do not lift anything that is heavier than 10 lb (4.5 kg), or the limit that your health care provider tells you, until he or she says that it is safe.  Do not drive for 24 hours if you were given a medicine to help you relax (sedative).  Do not drive or use heavy machinery while taking prescription pain medicine.  General instructions  Return to your normal activities as told by your health care provider. Ask your health care provider what activities are safe for you.  If the catheter site starts to bleed, lie flat and put pressure on the site.  Drink enough fluid to keep your urine clear or pale yellow. This helps flush the contrast dye from your body.  Take over-the-counter and prescription medicines only as told by your health care provider.  Keep all follow-up visits as directed by your health care provider. This is important.  Contact a health care provider if:  You have a fever or chills.  You have redness, swelling, or more pain around your insertion site.  You have fluid or blood coming from your insertion site.  The insertion site feels warm to the touch.  You have pus or a bad smell coming from your insertion site.  You have more bruising around the insertion site.  Blood collects in the tissue around the catheter site (hematoma), and the hematoma may be painful to the touch.  Get help right away if:  You have vision changes or loss of vision.  The catheter insertion area swells very fast.  You have numbness or weakness on one side of your body.  You have trouble talking, or you have slurred speech or cannot speak (aphasia).  You feel confused or have trouble remembering.  You have severe pain at the catheter insertion area.  The catheter insertion area is bleeding, and the bleeding does not stop after 30 minutes of  holding steady pressure on the site.  The arm or leg where the catheter was inserted is numb, tingling, or cold.  You have chest pain.  You have trouble breathing.  You have a rash.  You have trouble using the arm or leg where the catheter was inserted.  These symptoms may represent a serious problem that is an emergency. Do not wait to see if the symptoms will go away. Get medical help right away. Call your local emergency services (911 in [place] Do not drive yourself to the hospital.  Summary  After your procedure, it is common to have bruising and tenderness at the site where the catheter was inserted.  If your catheter insertion site begins to bleed, put direct pressure on it until the bleeding stops.  After your procedure, it is important to rest and drink plenty of fluids.  Return to your normal activities as told by your health care provider. Ask your health care provider what activities are safe for you.  This information is not intended to replace advice given to you by your health care provider. Make sure you discuss any questions you have with your health care provider.    DIET: To avoid nausea, slowly advance diet as tolerated, avoiding spicy or greasy foods for the first day.  Add more substantial food to your diet according to your physician's instructions. INCREASE FLUIDS AND FIBER TO AVOID CONSTIPATION.    SURGICAL DRESSING/BATHING: Maintain dressing for 24 hours. Okay to remove and shower after that. Do not submerge in water for 1 week (hot tubs, baths, pools).    MEDICATIONS: Resume taking daily medication.  Take prescribed pain medication with food.  If no medication is prescribed, you may take non-aspirin pain medication if needed.  PAIN MEDICATION CAN BE VERY CONSTIPATING.  Take a stool softener or laxative such as senokot, pericolace, or milk of magnesia if needed.    A follow-up appointment should be arranged with your doctor in 1-2 weeks; call to schedule.    You should CALL YOUR PHYSICIAN if  you develop:  Fever greater than 101 degrees F.  Pain not relieved by medication, or persistent nausea or vomiting.  Excessive bleeding (blood soaking through dressing) or unexpected drainage from the wound.  Extreme redness or swelling around the incision site, drainage of pus or foul smelling drainage.  Inability to urinate or empty your bladder within 8 hours.  Problems with breathing or chest pain.    You should call 911 if you develop problems with breathing or chest pain.  If you are unable to contact your doctor or surgical center, you should go to the nearest emergency room or urgent care center.  Physician's telephone #: Dr. Trujillo [phone]    MILD FLU-LIKE SYMPTOMS ARE NORMAL.  YOU MAY EXPERIENCE GENERALIZED MUSCLE ACHES, THROAT IRRITATION, HEADACHE AND/OR SOME NAUSEA.    If any questions arise, call your doctor.  If your doctor is not available, please feel free to call the Surgical Center at [phone].  The Center is open Monday through Friday from 7AM to 7PM.      A registered nurse may call you a few days after your surgery to see how you are doing after your procedure.    You may also receive a survey in the mail within the next two weeks and we ask that you take a few moments to complete the survey and return it to us.  Our goal is to provide you with very good care and we value your comments.     Depression / Suicide Risk    As you are discharged from this [facility] facility, it is important to learn how to keep safe from harming yourself.    Recognize the warning signs:  Abrupt changes in personality, positive or negative- including increase in energy   Giving away possessions  Change in eating patterns- significant weight changes-  positive or negative  Change in sleeping patterns- unable to sleep or sleeping all the time   Unwillingness or inability to communicate  Depression  Unusual sadness, discouragement and loneliness  Talk of wanting to die  Neglect of personal  appearance   Rebelliousness- reckless behavior  Withdrawal from people/activities they love  Confusion- inability to concentrate     If you or a loved one observes any of these behaviors or has concerns about self-harm, here's what you can do:  Talk about it- your feelings and reasons for harming yourself  Remove any means that you might use to hurt yourself (examples: pills, rope, extension cords, firearm)  Get professional help from the community (Mental Health, Substance Abuse, psychological counseling)  Do not be alone:Call your Safe Contact- someone whom you trust who will be there for you.  Call your local CRISIS HOTLINE 195-5619 or 116-994-4470  Call your local Children's Mobile Crisis Response Team [place] (132) 251-5049 or www.MyClasses  Call the toll free National Suicide Prevention Hotlines   National Suicide Prevention Lifeline 060-070-VGTM (3950)  National Hope Line Network 800-SUICIDE (232-5607)    I acknowledge receipt and understanding of these Home Care instructions.

## 2023-03-02 NOTE — OR NURSING
Patient denies pain, denies nausea. Patient c/o headache, medicated per orders with positive effect. CMS intact.  Patient ambulated without incident. Discharge per protocol.

## (undated) DEVICE — SLEEVE, VASO, THIGH, MED

## (undated) DEVICE — NEPTUNE 4 PORT MANIFOLD - (20/PK)

## (undated) DEVICE — SUCTION INSTRUMENT YANKAUER BULBOUS TIP W/O VENT (50EA/CA)

## (undated) DEVICE — GOWN WARMING STANDARD FLEX - (30/CA)

## (undated) DEVICE — STAPLER SKIN DISP - (6/BX 10BX/CA) VISISTAT

## (undated) DEVICE — LACTATED RINGERS INJ 1000 ML - (14EA/CA 60CA/PF)

## (undated) DEVICE — SET LEADWIRE 5 LEAD BEDSIDE DISPOSABLE ECG (1SET OF 5/EA)

## (undated) DEVICE — CHLORAPREP 3 ML APPLICATOR - (25/BX 4BX/CS 100/CS)

## (undated) DEVICE — MASK ANESTHESIA ADULT  - (100/CA)

## (undated) DEVICE — PROTECTOR ULNA NERVE - (36PR/CA)

## (undated) DEVICE — CANISTER SUCTION 3000ML MECHANICAL FILTER AUTO SHUTOFF MEDI-VAC NONSTERILE LF DISP  (40EA/CA)

## (undated) DEVICE — LEAD SET 6 DISP. EKG NIHON KOHDEN (100EA/CA) [9859].

## (undated) DEVICE — ELECTRODE 850 FOAM ADHESIVE - HYDROGEL RADIOTRNSPRNT (50/PK)

## (undated) DEVICE — DRESSING TRANSPARENT FILM TEGADERM 4 X 4.75" (50EA/BX)"

## (undated) DEVICE — TOWELS CLOTH SURGICAL - (4/PK 20PK/CA)

## (undated) DEVICE — SPONGE GAUZESTER 4 X 4 4PLY - (128PK/CA)

## (undated) DEVICE — KIT ROOM DECONTAMINATION

## (undated) DEVICE — DRAPE SURGICAL U 77X120 - (10/CA)

## (undated) DEVICE — DRAPE C-ARM LARGE 41IN X 74 IN - (10/BX 2BX/CA)

## (undated) DEVICE — PACK MAJOR BASIN - (2EA/CA)

## (undated) DEVICE — GUIDEPIN FOR 7.3MM CANNULATED SCREWS 2.8MM X 300MM (3TX6=18)(6EA/PK)

## (undated) DEVICE — GLOVE BIOGEL SZ 7.5 SURGICAL PF LTX - (50PR/BX 4BX/CA)

## (undated) DEVICE — SET EXTENSION WITH 2 PORTS (48EA/CA) ***PART #2C8610 IS A SUBSTITUTE*****

## (undated) DEVICE — ELECTRODE DUAL RETURN W/ CORD - (50/PK)

## (undated) DEVICE — HEAD HOLDER JUNIOR/ADULT

## (undated) DEVICE — DRAPE LARGE 3 QUARTER - (20/CA)

## (undated) DEVICE — GLOVE BIOGEL INDICATOR SZ 8 SURGICAL PF LTX - (50/BX 4BX/CA)

## (undated) DEVICE — SENSOR SPO2 NEO LNCS ADHESIVE (20/BX) SEE USER NOTES

## (undated) DEVICE — KIT ANESTHESIA W/CIRCUIT & 3/LT BAG W/FILTER (20EA/CA)

## (undated) DEVICE — DRAPE U ORTHOPEDIC - (10/BX)

## (undated) DEVICE — DRESSING XEROFORM 1X8 - (50/BX 4BX/CA)